# Patient Record
Sex: FEMALE | Race: BLACK OR AFRICAN AMERICAN | NOT HISPANIC OR LATINO | Employment: UNEMPLOYED | URBAN - METROPOLITAN AREA
[De-identification: names, ages, dates, MRNs, and addresses within clinical notes are randomized per-mention and may not be internally consistent; named-entity substitution may affect disease eponyms.]

---

## 2017-01-05 ENCOUNTER — HOSPITAL ENCOUNTER (EMERGENCY)
Facility: OTHER | Age: 22
Discharge: HOME | End: 2017-01-05
Attending: EMERGENCY MEDICINE
Payer: OTHER GOVERNMENT

## 2017-01-05 VITALS
DIASTOLIC BLOOD PRESSURE: 76 MMHG | OXYGEN SATURATION: 99 % | HEART RATE: 78 BPM | HEIGHT: 59 IN | TEMPERATURE: 99 F | RESPIRATION RATE: 18 BRPM | WEIGHT: 145 LBS | BODY MASS INDEX: 29.23 KG/M2 | SYSTOLIC BLOOD PRESSURE: 135 MMHG

## 2017-01-05 DIAGNOSIS — R51.9 ACUTE NONINTRACTABLE HEADACHE, UNSPECIFIED HEADACHE TYPE: Primary | ICD-10-CM

## 2017-01-05 LAB
B-HCG UR QL: NEGATIVE
CTP QC/QA: YES

## 2017-01-05 PROCEDURE — 96372 THER/PROPH/DIAG INJ SC/IM: CPT

## 2017-01-05 PROCEDURE — 25000003 PHARM REV CODE 250: Performed by: PHYSICIAN ASSISTANT

## 2017-01-05 PROCEDURE — 81025 URINE PREGNANCY TEST: CPT | Performed by: PHYSICIAN ASSISTANT

## 2017-01-05 PROCEDURE — 63600175 PHARM REV CODE 636 W HCPCS: Performed by: PHYSICIAN ASSISTANT

## 2017-01-05 PROCEDURE — 99283 EMERGENCY DEPT VISIT LOW MDM: CPT | Mod: 25

## 2017-01-05 RX ORDER — BUTALBITAL, ACETAMINOPHEN AND CAFFEINE 50; 325; 40 MG/1; MG/1; MG/1
1 TABLET ORAL EVERY 6 HOURS PRN
Qty: 8 TABLET | Refills: 0 | Status: SHIPPED | OUTPATIENT
Start: 2017-01-05 | End: 2017-02-04

## 2017-01-05 RX ORDER — KETOROLAC TROMETHAMINE 30 MG/ML
30 INJECTION, SOLUTION INTRAMUSCULAR; INTRAVENOUS
Status: COMPLETED | OUTPATIENT
Start: 2017-01-05 | End: 2017-01-05

## 2017-01-05 RX ORDER — BUTALBITAL, ACETAMINOPHEN AND CAFFEINE 50; 325; 40 MG/1; MG/1; MG/1
1 TABLET ORAL
Status: COMPLETED | OUTPATIENT
Start: 2017-01-05 | End: 2017-01-05

## 2017-01-05 RX ADMIN — BUTALBITAL, ACETAMINOPHEN AND CAFFEINE 1 TABLET: 50; 325; 40 TABLET ORAL at 07:01

## 2017-01-05 RX ADMIN — KETOROLAC TROMETHAMINE 30 MG: 30 INJECTION, SOLUTION INTRAMUSCULAR at 07:01

## 2017-01-05 NOTE — ED AVS SNAPSHOT
[facility]  [address]  [facility] [address]               Suzanna Cai   2017  7:23 PM   ED    Description:  Female : 1995   Department:  [facility]           Your Care was Coordinated By:     Provider Role From To    Porsha Castle MD Attending Provider 17 --    Kianna Pratt PA-C Physician Assistant 17 --      Reason for Visit     Headache           Diagnoses this Visit        Comments    Acute nonintractable headache, unspecified headache type    -  Primary       ED Disposition     None           To Do List           Follow-up Information     Follow up with PCP.    Why:  Keep appointment for tomorrow         Follow up with [facility].    Specialty:  Emergency Medicine    Why:  If symptoms worsen    Contact information:    [address]  [facility] [id number] [phone]       These Medications        Disp Refills Start End    butalbital-acetaminophen-caffeine -40 mg (FIORICET, ESGIC) -40 mg per tablet 8 tablet 0 2017    Take 1 tablet by mouth every 6 (six) hours as needed for Headaches. - Oral    Pharmacy: [facility] [address] [facility] [address]  #: [phone]         [facility]     [facility] Nurse Care Line -  Assistance  Registered nurses in the [facility] provide clinical advisement, health education, appointment booking, and other advisory services.  Call for this free service at [phone].             Medications           Message regarding Medications     Verify the changes and/or additions to your medication regime listed below are the same as discussed with your clinician today.  If any of these changes or additions are incorrect, please notify your healthcare provider.        START taking these NEW medications        Refills    butalbital-acetaminophen-caffeine  "-40 mg (FIORICET, ESGIC) -40 mg per tablet 0    Sig: Take 1 tablet by mouth every 6 (six) hours as needed for Headaches.    Class: Print    Route: Oral      These medications were administered today        Dose Freq    ketorolac injection 30 mg 30 mg ED 1 Time    Sig: Inject 30 mg into the muscle ED 1 Time.    Class: Normal    Route: Intramuscular    butalbital-acetaminophen-caffeine -40 mg per tablet 1 tablet 1 tablet ED 1 Time    Sig: Take 1 tablet by mouth ED 1 Time.    Class: Normal    Route: Oral           Verify that the below list of medications is an accurate representation of the medications you are currently taking.  If none reported, the list may be blank. If incorrect, please contact your healthcare provider. Carry this list with you in case of emergency.           Current Medications     butalbital-acetaminophen-caffeine -40 mg (FIORICET, ESGIC) -40 mg per tablet Take 1 tablet by mouth every 6 (six) hours as needed for Headaches.    butalbital-acetaminophen-caffeine -40 mg per tablet 1 tablet Take 1 tablet by mouth ED 1 Time.    ketorolac injection 30 mg Inject 30 mg into the muscle ED 1 Time.    medroxyPROGESTERone (DEPO-PROVERA) 150 mg/mL injection Inject 1 mL (150 mg total) into the muscle every 3 (three) months.           Clinical Reference Information           Your Vitals Were     BP Pulse Temp Resp Height Weight    138/78 (BP Location: Left arm, Patient Position: Sitting) 75 98.8 °F (37.1 °C) (Oral) 18 4' 11" (1.499 m) 65.8 kg (145 lb)    SpO2 BMI             100% 29.29 kg/m2         Allergies as of 1/5/2017     No Known Allergies      Immunizations Administered on Date of Encounter - 1/5/2017     None      ED Micro, Lab, POCT     Start Ordered       Status Ordering Provider    01/05/17 1943 01/05/17 1943  POCT urine pregnancy  Once      Final result       ED Imaging Orders     None      Discharge References/Attachments     HEADACHE, UNSPECIFIED (ENGLISH)    " HEADACHES, SELF-CARE FOR (ENGLISH)      Your Scheduled Appointments     Jan 06, 2017 11:20 AM CST   [facility] Patient with Liz[facility]o MD   [facility]    [address]  [place] [address]   [phone]               [facility] complies with applicable Federal civil rights laws and does not discriminate on the basis of race, color, national origin, age, disability, or sex.        Language Assistance Services     ATTENTION: Language assistance services are available, free of charge. Please call [phone].      ATENCIÓN: Si habla español, tiene a weiner disposición servicios gratuitos de asistencia lingüística. Llame al [phone].     CHÚ Ý: N?u b?n nói Ti?ng Vi?t, có các d?ch v? h? tr? ngôn ng? mi?n phí dành cho b?n. G?i s? [phone].

## 2017-01-06 ENCOUNTER — OFFICE VISIT (OUTPATIENT)
Dept: INTERNAL MEDICINE | Facility: CLINIC | Age: 22
End: 2017-01-06
Attending: INTERNAL MEDICINE
Payer: OTHER GOVERNMENT

## 2017-01-06 VITALS
DIASTOLIC BLOOD PRESSURE: 68 MMHG | OXYGEN SATURATION: 93 % | BODY MASS INDEX: 30.63 KG/M2 | WEIGHT: 151.69 LBS | SYSTOLIC BLOOD PRESSURE: 114 MMHG | HEART RATE: 86 BPM

## 2017-01-06 DIAGNOSIS — L50.9 HIVES: ICD-10-CM

## 2017-01-06 DIAGNOSIS — Z87.820 HISTORY OF CONCUSSION: ICD-10-CM

## 2017-01-06 DIAGNOSIS — Z00.00 ANNUAL PHYSICAL EXAM: Primary | ICD-10-CM

## 2017-01-06 DIAGNOSIS — F07.81 POST CONCUSSIVE SYNDROME: ICD-10-CM

## 2017-01-06 DIAGNOSIS — R51.9 NONINTRACTABLE EPISODIC HEADACHE, UNSPECIFIED HEADACHE TYPE: ICD-10-CM

## 2017-01-06 PROCEDURE — 99385 PREV VISIT NEW AGE 18-39: CPT | Mod: S$PBB,,, | Performed by: INTERNAL MEDICINE

## 2017-01-06 PROCEDURE — 99214 OFFICE O/P EST MOD 30 MIN: CPT | Mod: PBBFAC | Performed by: INTERNAL MEDICINE

## 2017-01-06 PROCEDURE — 99999 PR PBB SHADOW E&M-EST. PATIENT-LVL IV: CPT | Mod: PBBFAC,,, | Performed by: INTERNAL MEDICINE

## 2017-01-06 RX ORDER — EPINEPHRINE 0.3 MG/.3ML
1 INJECTION SUBCUTANEOUS ONCE
Qty: 2 EACH | Refills: 0 | Status: SHIPPED | OUTPATIENT
Start: 2017-01-06 | End: 2019-08-23

## 2017-01-06 RX ORDER — MEDROXYPROGESTERONE ACETATE 150 MG/ML
INJECTION, SUSPENSION INTRAMUSCULAR
COMMUNITY
Start: 2016-12-08 | End: 2017-05-19 | Stop reason: SDUPTHER

## 2017-01-06 RX ORDER — CETIRIZINE HYDROCHLORIDE 10 MG/1
10 TABLET ORAL DAILY
Qty: 90 TABLET | Refills: 1 | Status: SHIPPED | OUTPATIENT
Start: 2017-01-06 | End: 2017-05-19 | Stop reason: SDUPTHER

## 2017-01-06 NOTE — MR AVS SNAPSHOT
[facility]  [address]  [place] [address]  Phone: [phone]  Fax: [phone]                  Suzanna Cai   2017 11:20 AM   Office Visit    Description:  Female : 1995   Provider:  Lizbeth Galo MD   Department:  [facility]           Reason for Visit     Annual Exam           Diagnoses this Visit        Comments    Annual physical exam    -  Primary     Post concussive syndrome         Nonintractable episodic headache, unspecified headache type         History of concussion         Hives                To Do List           Future Appointments        Provider Department Dept Phone    2017 7:00 AM LAB, [facility] [phone]    2017 8:20 AM MD Dima Vaughan III [facility] - Allergy/ Immunology [phone]    2/10/2017 1:15 PM MD Dima Gamboa [facility] - Dermatology [phone]      Goals (5 Years of Data)     None      Follow-Up and Disposition     Return in about 1 year (around 2018), or if symptoms worsen or fail to improve.       These Medications        Disp Refills Start End    epinephrine (EPIPEN 2-DAVID) 0.3 mg/0.3 mL AtIn 2 each 0 2017    Inject 0.3 mLs (0.3 mg total) into the muscle once. Prn throat swelling and call 911! - Intramuscular    Pharmacy: [facility] [place] - [address] [place] Ph #: [phone]       cetirizine (ZYRTEC) 10 MG tablet 90 tablet 1 2017    Take 1 tablet (10 mg total) by mouth once daily. - Oral    Pharmacy: [facility] [place] [place] - [address] Ph #: [phone]         [facility]     [facility] Nurse Care Line -  Assistance  Registered nurses in the [facility] provide clinical advisement, health education, appointment booking, and other advisory services.  Call for this free service at [phone].             Medications            Message regarding Medications     Verify the changes and/or additions to your medication regime listed below are the same as discussed with your clinician today.  If any of these changes or additions are incorrect, please notify your healthcare provider.        START taking these NEW medications        Refills    epinephrine (EPIPEN 2-DAVID) 0.3 mg/0.3 mL AtIn 0    Sig: Inject 0.3 mLs (0.3 mg total) into the muscle once. Prn throat swelling and call 911!    Class: Normal    Route: Intramuscular    cetirizine (ZYRTEC) 10 MG tablet 1    Sig: Take 1 tablet (10 mg total) by mouth once daily.    Class: Normal    Route: Oral           Verify that the below list of medications is an accurate representation of the medications you are currently taking.  If none reported, the list may be blank. If incorrect, please contact your healthcare provider. Carry this list with you in case of emergency.           Current Medications     butalbital-acetaminophen-caffeine -40 mg (FIORICET, ESGIC) -40 mg per tablet Take 1 tablet by mouth every 6 (six) hours as needed for Headaches.    medroxyPROGESTERone (DEPO-PROVERA) 150 mg/mL injection Inject 1 mL (150 mg total) into the muscle every 3 (three) months.    medroxyPROGESTERone (DEPO-PROVERA) 150 mg/mL Syrg     cetirizine (ZYRTEC) 10 MG tablet Take 1 tablet (10 mg total) by mouth once daily.    epinephrine (EPIPEN 2-DAVID) 0.3 mg/0.3 mL AtIn Inject 0.3 mLs (0.3 mg total) into the muscle once. Prn throat swelling and call 911!           Clinical Reference Information           Vital Signs - Last Recorded  Most recent update: 1/6/2017 11:27 AM by Álvaro Flores MA    BP Pulse Wt SpO2 BMI    114/68 (BP Location: Left arm, Patient Position: Sitting, BP Method: Manual) 86 68.8 kg (151 lb 10.8 oz) (!) 93% 30.63 kg/m2      Blood Pressure          Most Recent Value    BP  114/68      Allergies as of 1/6/2017     No Known Allergies      Immunizations Administered on Date of  Encounter - 1/6/2017     None      Orders Placed During Today's Visit      Normal Orders This Visit    Ambulatory consult to Allergy     Ambulatory Referral to Dermatology     Ambulatory Referral to Neurology     Future Labs/Procedures Expected by Expires    CBC W/ AUTO DIFFERENTIAL  1/6/2017 3/7/2018    COMPREHENSIVE METABOLIC PANEL  1/6/2017 3/7/2018    Lipid panel  1/6/2017 3/6/2018    Lipid panel  1/6/2017 1/6/2018    TSH  1/6/2017 3/7/2018
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
## 2017-01-06 NOTE — ED NOTES
"Pt denies current headache at this time, last pain was earlier today which pt took x2 tylenol which resolved pain, pt just wants to be checked out for "brain aneurism". Pts mother told pt that this could be possible with current symptoms   "

## 2017-01-09 ENCOUNTER — TELEPHONE (OUTPATIENT)
Dept: NEUROLOGY | Facility: CLINIC | Age: 22
End: 2017-01-09

## 2017-01-09 NOTE — TELEPHONE ENCOUNTER
----- Message from Meagan Anderson MD sent at 1/9/2017  8:58 AM CST -----  Contact: Shari with Dr. Galo's office  Please schedule the patient for 1 hour new eval on Monday 1/16 at 8:30AM.  Thank you.   ----- Message -----     From: Antwan Farley     Sent: 1/6/2017   1:18 PM       To: Meagan Anderson MD    Patient referred by Dr. Galo for concussion/headache evaluation. Next available 2/2/2017.  Please advise.  ----- Message -----     From: Rafia Crawford     Sent: 1/6/2017  12:26 PM       To: [facility] Staff    x  1st Request  _  2nd Request  _  3rd Request        Who: Shari with Dr. Galo's office    Why: Shari states pt has headaches due to concussion and Dr. Galo has referred the pt to be seen as soon as possible. An attempt was made but first available is 02/02. Please call pt, thanks!    What Number to Call Back: [phone]    When to Expect a call back: (Before the end of the day)   -- if call after 3:00 call back will be tomorrow.

## 2017-01-09 NOTE — TELEPHONE ENCOUNTER
----- Message from Rafia Crawford sent at 1/6/2017 12:26 PM CST -----  Contact: Shari with Dr. Galo's office  x  1st Request  _  2nd Request  _  3rd Request        Who: Shari with Dr. Galo's office    Why: Shari states pt has headaches due to concussion and Dr. Galo has referred the pt to be seen as soon as possible. An attempt was made but first available is 02/02. Please call pt, thanks!    What Number to Call Back: [phone]    When to Expect a call back: (Before the end of the day)   -- if call after 3:00 call back will be tomorrow.

## 2017-01-13 ENCOUNTER — TELEPHONE (OUTPATIENT)
Dept: NEUROLOGY | Facility: CLINIC | Age: 22
End: 2017-01-13

## 2017-01-16 ENCOUNTER — OFFICE VISIT (OUTPATIENT)
Dept: NEUROLOGY | Facility: CLINIC | Age: 22
End: 2017-01-16
Attending: INTERNAL MEDICINE
Payer: OTHER GOVERNMENT

## 2017-01-16 VITALS
SYSTOLIC BLOOD PRESSURE: 104 MMHG | BODY MASS INDEX: 30.35 KG/M2 | WEIGHT: 150.56 LBS | DIASTOLIC BLOOD PRESSURE: 72 MMHG | HEIGHT: 59 IN | HEART RATE: 72 BPM

## 2017-01-16 DIAGNOSIS — G43.109 MIGRAINE WITH AURA AND WITHOUT STATUS MIGRAINOSUS, NOT INTRACTABLE: Primary | ICD-10-CM

## 2017-01-16 PROCEDURE — 99213 OFFICE O/P EST LOW 20 MIN: CPT | Mod: PBBFAC | Performed by: PSYCHIATRY & NEUROLOGY

## 2017-01-16 PROCEDURE — 99205 OFFICE O/P NEW HI 60 MIN: CPT | Mod: S$PBB,,, | Performed by: PSYCHIATRY & NEUROLOGY

## 2017-01-16 PROCEDURE — 99999 PR PBB SHADOW E&M-EST. PATIENT-LVL III: CPT | Mod: PBBFAC,,, | Performed by: PSYCHIATRY & NEUROLOGY

## 2017-01-16 RX ORDER — SUMATRIPTAN SUCCINATE 25 MG/1
25 TABLET ORAL
Qty: 12 TABLET | Refills: 5 | Status: SHIPPED | OUTPATIENT
Start: 2017-01-16 | End: 2017-02-14 | Stop reason: SDUPTHER
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
## 2017-01-16 NOTE — LETTER
January 16, 2017      Lizbeth Galo MD  [address]  [facility] [address]           [facility] - Neurology  [address]  [facility] [address]  Phone: [phone]  Fax: [phone]          Patient: Suzanna Cai   MR Number: [id number]   YOB: 1995   Date of Visit: 1/16/2017       Dear Dr. Lizbeth Galo:    Thank you for referring Suzanna Cai to me for evaluation. Attached you will find relevant portions of my assessment and plan of care.    If you have questions, please do not hesitate to call me. I look forward to following Suzanna Cai along with you.    Sincerely,    Meagan Anderson MD    Enclosure  CC:  No Recipients    If you would like to receive this communication electronically, please contact [email or web address] or [phone] to request more information on [employer] Link access.    For providers and/or their staff who would like to refer a patient to [facility], please contact us through our one-stop-shop provider referral line, [facility], at [phone].    If you feel you have received this communication in error or would no longer like to receive these types of communications, please e-mail [email or web address]

## 2017-01-16 NOTE — PATIENT INSTRUCTIONS
Thank you for visiting us at [facility].  You were seen by Dr. Meagan Anderson.  You were seen for migraines.  We will be recommending the following:      -please obtain outside hospital records, specifically MRI and CTH reports AND original imaging  -migraine journal: consider an mobile application like [employer] and migraine buddy  --lifestyle modifications: sleep, diet, exercise, reviewed  -sleep hygiene reviewed  -diagnostics: Vitamin D, riboflavin, Co-enzyme Q10, B12/folate  -PPX: Magnesium Oxide supplementation 400mg daily  -Abortive: Fioricet as needed , Excedrin migraine, and sumatriptan as needed   -No more than 2-3 times a week Total      You may receive a survey about your experience at [facility].  We appreciate you taking the time to complete the survey to help us improve our service and care.    -Brain Health lifestyle modifications:    -sleep hygiene   - diet: Mediterranean Diet    -exercise: 20-30 minutes of  Moderate exercise 3-5 times a week   -stress management reviewed   -smoking cessation, alcohol moderation    Check out my blog post for further information:  [email or web address]     Sleep Hygiene:    1. Avoid watching TV, eating, and discussing emotional issues in bed. The bed should be used for sleep and sex only. If not, we can associate the bed with other activities and it often becomes difficult to fall asleep.  2. Minimize noise, light, and temperature extremes during sleep with ear plugs, window blinds, or an electric blanket or air conditioner. Even the slightest nighttime noises or luminescent lights can disrupt the quality of your sleep. Try to keep your bedroom at a comfortable temperature -- not too hot (above 75 degrees) or too cold (below 54 degrees).  3. Try not to drink fluids after 8 p.m. This may reduce awakenings due to urination.  4. Avoid naps, but if you do nap, make it no more than about 25  minutes about eight hours after you awake. But if you have problems falling asleep, then no naps for you.  5. Do not expose your self to bright light if you need to get up at night. Use a small night-light instead.  6. Nicotine is a stimulant and should be avoided particularly near bedtime and upon night awakenings. Having a smoke before bed, although it may feel relaxing, is actually putting a stimulant into your bloodstream.  7. Caffeine is also a stimulant and is present in coffee (100-200 mg), soda (50-75 mg), tea (50-75 mg), and various over-the-counter medications. Caffeine should be discontinued at least four to six hours before bedtime. If you consume large amounts of caffeine and you cut your self off too quickly, beware; you may get headaches that could keep you awake.  8. Although alcohol is a depressant and may help you fall asleep, the subsequent metabolism that clears it from your body when you are sleeping causes a withdrawal syndrome. This withdrawal causes awakenings and is often associated with nightmares and sweats.  9. A light snack may be sleep-inducing, but a heavy meal too close to bedtime interferes with sleep. Stay away from protein and stick to carbohydrates or dairy products. Milk contains the amino acid L-tryptophan, which has been shown in research to help people go to sleep. So milk and cookies or crackers (without chocolate) may be useful and taste good as well.

## 2017-01-20 ENCOUNTER — LAB VISIT (OUTPATIENT)
Dept: LAB | Facility: OTHER | Age: 22
End: 2017-01-20
Attending: INTERNAL MEDICINE
Payer: OTHER GOVERNMENT

## 2017-01-20 DIAGNOSIS — G43.109 MIGRAINE WITH AURA AND WITHOUT STATUS MIGRAINOSUS, NOT INTRACTABLE: ICD-10-CM

## 2017-01-20 DIAGNOSIS — Z00.00 ANNUAL PHYSICAL EXAM: ICD-10-CM

## 2017-01-20 LAB
25(OH)D3+25(OH)D2 SERPL-MCNC: 16 NG/ML
ALBUMIN SERPL BCP-MCNC: 3.7 G/DL
ALP SERPL-CCNC: 86 U/L
ALT SERPL W/O P-5'-P-CCNC: 15 U/L
ANION GAP SERPL CALC-SCNC: 8 MMOL/L
AST SERPL-CCNC: 20 U/L
BASOPHILS # BLD AUTO: 0 K/UL
BASOPHILS NFR BLD: 0 %
BILIRUB SERPL-MCNC: 0.4 MG/DL
BUN SERPL-MCNC: 6 MG/DL
CALCIUM SERPL-MCNC: 9.2 MG/DL
CHLORIDE SERPL-SCNC: 108 MMOL/L
CHOLEST/HDLC SERPL: 3.2 {RATIO}
CHOLEST/HDLC SERPL: 3.2 {RATIO}
CO2 SERPL-SCNC: 24 MMOL/L
CREAT SERPL-MCNC: 0.8 MG/DL
DIFFERENTIAL METHOD: ABNORMAL
EOSINOPHIL # BLD AUTO: 0.1 K/UL
EOSINOPHIL NFR BLD: 1.2 %
ERYTHROCYTE [DISTWIDTH] IN BLOOD BY AUTOMATED COUNT: 13.1 %
EST. GFR  (AFRICAN AMERICAN): >60 ML/MIN/1.73 M^2
EST. GFR  (NON AFRICAN AMERICAN): >60 ML/MIN/1.73 M^2
FOLATE SERPL-MCNC: 11.3 NG/ML
GLUCOSE SERPL-MCNC: 95 MG/DL
HCT VFR BLD AUTO: 38.3 %
HDL/CHOLESTEROL RATIO: 31 %
HDL/CHOLESTEROL RATIO: 31 %
HDLC SERPL-MCNC: 158 MG/DL
HDLC SERPL-MCNC: 158 MG/DL
HDLC SERPL-MCNC: 49 MG/DL
HDLC SERPL-MCNC: 49 MG/DL
HGB BLD-MCNC: 13 G/DL
LDLC SERPL CALC-MCNC: 99.6 MG/DL
LDLC SERPL CALC-MCNC: 99.6 MG/DL
LYMPHOCYTES # BLD AUTO: 2.6 K/UL
LYMPHOCYTES NFR BLD: 42.7 %
MCH RBC QN AUTO: 30.7 PG
MCHC RBC AUTO-ENTMCNC: 33.9 %
MCV RBC AUTO: 90 FL
MONOCYTES # BLD AUTO: 0.2 K/UL
MONOCYTES NFR BLD: 4 %
NEUTROPHILS # BLD AUTO: 3.1 K/UL
NEUTROPHILS NFR BLD: 52.1 %
NONHDLC SERPL-MCNC: 109 MG/DL
NONHDLC SERPL-MCNC: 109 MG/DL
PLATELET # BLD AUTO: 264 K/UL
PMV BLD AUTO: 10.7 FL
POTASSIUM SERPL-SCNC: 4.1 MMOL/L
PROT SERPL-MCNC: 7.5 G/DL
RBC # BLD AUTO: 4.24 M/UL
SODIUM SERPL-SCNC: 140 MMOL/L
TRIGL SERPL-MCNC: 47 MG/DL
TRIGL SERPL-MCNC: 47 MG/DL
TSH SERPL DL<=0.005 MIU/L-ACNC: 1.46 UIU/ML
WBC # BLD AUTO: 6 K/UL

## 2017-01-20 PROCEDURE — 82306 VITAMIN D 25 HYDROXY: CPT

## 2017-01-20 PROCEDURE — 36415 COLL VENOUS BLD VENIPUNCTURE: CPT

## 2017-01-20 PROCEDURE — 82746 ASSAY OF FOLIC ACID SERUM: CPT

## 2017-01-20 PROCEDURE — 80053 COMPREHEN METABOLIC PANEL: CPT

## 2017-01-20 PROCEDURE — 84443 ASSAY THYROID STIM HORMONE: CPT

## 2017-01-20 PROCEDURE — 80061 LIPID PANEL: CPT

## 2017-01-20 PROCEDURE — 85025 COMPLETE CBC W/AUTO DIFF WBC: CPT

## 2017-01-20 PROCEDURE — 82542 COL CHROMOTOGRAPHY QUAL/QUAN: CPT

## 2017-01-20 PROCEDURE — 84252 ASSAY OF VITAMIN B-2: CPT

## 2017-01-20 PROCEDURE — 84207 ASSAY OF VITAMIN B-6: CPT

## 2017-01-23 LAB — PYRIDOXAL SERPL-MCNC: 9 UG/L (ref 5–50)

## 2017-01-25 LAB — UBIQUINONE10 SERPL-MCNC: 0.54 MG/L (ref 0.44–1.64)

## 2017-01-26 LAB — VIT B2 SERPL-MCNC: 6 MCG/L (ref 1–19)

## 2017-02-01 ENCOUNTER — OFFICE VISIT (OUTPATIENT)
Dept: ALLERGY | Facility: CLINIC | Age: 22
End: 2017-02-01
Payer: OTHER GOVERNMENT

## 2017-02-01 VITALS
HEIGHT: 59 IN | OXYGEN SATURATION: 92 % | DIASTOLIC BLOOD PRESSURE: 74 MMHG | BODY MASS INDEX: 31.11 KG/M2 | TEMPERATURE: 99 F | SYSTOLIC BLOOD PRESSURE: 120 MMHG | WEIGHT: 154.31 LBS

## 2017-02-01 DIAGNOSIS — L50.1 IDIOPATHIC URTICARIA: Primary | ICD-10-CM

## 2017-02-01 PROCEDURE — 99999 PR PBB SHADOW E&M-EST. PATIENT-LVL III: CPT | Mod: PBBFAC,,, | Performed by: ALLERGY & IMMUNOLOGY

## 2017-02-01 PROCEDURE — 99213 OFFICE O/P EST LOW 20 MIN: CPT | Mod: PBBFAC | Performed by: ALLERGY & IMMUNOLOGY

## 2017-02-01 PROCEDURE — 99204 OFFICE O/P NEW MOD 45 MIN: CPT | Mod: S$PBB,,, | Performed by: ALLERGY & IMMUNOLOGY

## 2017-02-01 RX ORDER — ERGOCALCIFEROL 1.25 MG/1
50000 CAPSULE ORAL
Qty: 12 CAPSULE | Refills: 1 | Status: SHIPPED | OUTPATIENT
Start: 2017-02-01 | End: 2017-04-24 | Stop reason: SDUPTHER
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
## 2017-02-01 NOTE — LETTER
February 1, 2017      Lizbeth Galo MD  [address]  [facility] [address]           [facility]  [address]  [facility] [address]  Phone: [phone]  Fax: [phone]          Patient: Suzanna Cai   MR Number: [id number]   YOB: 1995   Date of Visit: 2/1/2017       Dear Dr. Lizbeth Galo:    Thank you for referring Suzanna Cai to me for evaluation. Attached you will find relevant portions of my assessment and plan of care.    If you have questions, please do not hesitate to call me. I look forward to following Suzanna Cai along with you.    Sincerely,    HAN Eaton III, MD    Enclosure  CC:  No Recipients    If you would like to receive this communication electronically, please contact [email or web address] or [phone] to request more information on [employer] Link access.    For providers and/or their staff who would like to refer a patient to [facility], please contact us through our one-stop-shop provider referral line, [facility], at [phone].    If you feel you have received this communication in error or would no longer like to receive these types of communications, please e-mail [email or web address]

## 2017-02-02 ENCOUNTER — PATIENT MESSAGE (OUTPATIENT)
Dept: ALLERGY | Facility: CLINIC | Age: 22
End: 2017-02-02

## 2017-02-06 ENCOUNTER — OFFICE VISIT (OUTPATIENT)
Dept: ALLERGY | Facility: CLINIC | Age: 22
End: 2017-02-06
Payer: OTHER GOVERNMENT

## 2017-02-06 VITALS
HEIGHT: 59 IN | WEIGHT: 154.56 LBS | SYSTOLIC BLOOD PRESSURE: 112 MMHG | OXYGEN SATURATION: 95 % | DIASTOLIC BLOOD PRESSURE: 60 MMHG | BODY MASS INDEX: 31.16 KG/M2 | HEART RATE: 71 BPM

## 2017-02-06 DIAGNOSIS — J31.0 CHRONIC RHINITIS: ICD-10-CM

## 2017-02-06 DIAGNOSIS — L50.1 IDIOPATHIC URTICARIA: Primary | ICD-10-CM

## 2017-02-06 DIAGNOSIS — T78.3XXD ANGIOEDEMA, SUBSEQUENT ENCOUNTER: ICD-10-CM

## 2017-02-06 DIAGNOSIS — H10.403 CHRONIC CONJUNCTIVITIS OF BOTH EYES, UNSPECIFIED CHRONIC CONJUNCTIVITIS TYPE: ICD-10-CM

## 2017-02-06 DIAGNOSIS — E55.9 VITAMIN D INSUFFICIENCY: ICD-10-CM

## 2017-02-06 PROCEDURE — 99999 PR PBB SHADOW E&M-EST. PATIENT-LVL III: CPT | Mod: PBBFAC,,, | Performed by: ALLERGY & IMMUNOLOGY

## 2017-02-06 PROCEDURE — 99213 OFFICE O/P EST LOW 20 MIN: CPT | Mod: PBBFAC | Performed by: ALLERGY & IMMUNOLOGY

## 2017-02-06 PROCEDURE — 99214 OFFICE O/P EST MOD 30 MIN: CPT | Mod: S$PBB,,, | Performed by: ALLERGY & IMMUNOLOGY

## 2017-02-06 NOTE — PROGRESS NOTES
Suzanna Cai returns to clinic today for continued evaluation of urticaria and angioedema. She is here alone. She was last seen February 1, 2017.    She did start taking her vitamin D. She was taking her Zyrtec daily. She did not take any on Friday.    That night she had another episode of urticaria. He was red, raised, and pruritic. She take 2 Benadryl and went to bed.    The next day it had resolved.    There is no association with any food, contact, or ingestion.    She denies any rhinitis or conjunctivitis. She denies any cough, wheezing, or shortness of breath.    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    [facility] PEQ ALLERGY QUESTIONNAIRE SHORT 2/6/2017   Are you taking any new medications since your last visit? Yes   Constitution: No symptoms   Head or facial pain: Headaches   Eyes: No symptoms   Ears: No symptoms   Nose: No symptoms   Throat: No symptoms   Sinuses: No symptoms   Lungs: No symptoms   Skin: Itching, Hives, Redness, Swelling   Cardiovascular: No symptoms   Gastrointestinal: No symptoms   Genital/ urinary Urination at night more than twice   Musculoskeletal: No symptoms   Neurologic: Headaches   Endocrine: Headaches   Hematologic: No symptoms     Physical Examination:  General: Well-developed, well-nourished, no acute distress.  Head: No sinus tenderness.  Eyes: Conjunctivae:  No bulbar or palpebral conjunctival injection.  Ears: EAC's clear.  TM's clear.  No pre-auricular nodes.  Nose: Nasal Mucosa:  Pink.  Septum: No apparent deviation.  Turbinates:  No significant edema.  Polyps/Mass:  None visible.  Teeth/Gums:  No bleeding noted.  Oropharynx: No exudates.  Neck: Supple without thyromegaly. No cervical lymphadenopathy.    Respiratory/Chest: Effort: Good.  Auscultation:  Clear bilaterally.  Cardiovascular:  No murmur, rubs, or gallop heard.   GI:  Non-tender.  No masses.  No organomegaly.  Extremities:  No swelling.  No cyanosis, clubbing, or edema.  Skin: Good turgor.  No urticaria or  angioedema.  Neuro/Psych: Oriented x 3.    Pictures are reviewed on her cell phone and are consistent with urticaria and angioedema.    Laboratory 1/20/2017:  CBC: Normal.  CMP: Normal.  TSH: 1.456.  Vitamin D level: 16.  Lipid panel: Cholesterol 158.    Laboratory 2/1/2017:  IgE level: 72.  ImmunoCAP: Negative.  TSH: 1.3-4.  Thyroid peroxidase antibody level: Less than 6.0.  Thyroglobulin antibody level: Less than 4.0.  Serum tryptase: 2.5.  SPEP:  Normal.    Assessment:  1. Chronic urticaria and angioedema, probably idiopathic.  2. Chronic rhinitis, doubt allergic.  3. Chronic conjunctivitis, doubt allergic.  4. Migraine headaches.  5. Vitamin D deficiency.    Recommendations:  1. Take Zyrtec daily.  2. May take 2 Zyrtec if needed.  3. Consider Atarax.  4. Benadryl as needed.  5. Continue vitamin D level.  6. Return to clinic in 2 weeks or sooner if needed.    Allergic mechanisms and treatment options were reviewed in detail. Urticaria and angioedema were reviewed.

## 2017-02-10 ENCOUNTER — INITIAL CONSULT (OUTPATIENT)
Dept: DERMATOLOGY | Facility: CLINIC | Age: 22
End: 2017-02-10
Payer: OTHER GOVERNMENT

## 2017-02-10 DIAGNOSIS — L21.9 ACUTE SEBORRHEIC DERMATITIS: ICD-10-CM

## 2017-02-10 DIAGNOSIS — T78.3XXD ANGIOEDEMA, SUBSEQUENT ENCOUNTER: Primary | ICD-10-CM

## 2017-02-10 DIAGNOSIS — L50.1 IDIOPATHIC URTICARIA: ICD-10-CM

## 2017-02-10 PROCEDURE — 99202 OFFICE O/P NEW SF 15 MIN: CPT | Mod: S$PBB,,, | Performed by: DERMATOLOGY

## 2017-02-10 PROCEDURE — 99999 PR PBB SHADOW E&M-EST. PATIENT-LVL II: CPT | Mod: PBBFAC,,, | Performed by: DERMATOLOGY

## 2017-02-10 PROCEDURE — 99212 OFFICE O/P EST SF 10 MIN: CPT | Mod: PBBFAC | Performed by: DERMATOLOGY

## 2017-02-10 RX ORDER — FLUOCINOLONE ACETONIDE 0.11 MG/ML
OIL TOPICAL
Qty: 1 BOTTLE | Refills: 3 | Status: SHIPPED | OUTPATIENT
Start: 2017-02-10

## 2017-02-10 RX ORDER — KETOCONAZOLE 20 MG/ML
SHAMPOO, SUSPENSION TOPICAL
Qty: 120 ML | Refills: 5 | Status: SHIPPED | OUTPATIENT
Start: 2017-02-10 | End: 2018-07-30

## 2017-02-10 NOTE — MR AVS SNAPSHOT
[facility] - Dermatology  [address]  [facility] [address]  Phone: [phone]  Fax: [phone]                  Suzanna Cai   2/10/2017 1:15 PM   Initial consult    Description:  Female : 1995   Provider:  Jaquelin Delaney MD   Department:  [facility] Dermat[facility]           Reason for Visit     Hair/Scalp Problem     Rash           Diagnoses this Visit        Comments    Angioedema, subsequent encounter    -  Primary     Acute seborrheic dermatitis                To Do List           Future Appointments        Provider Department Dept Phone    2/10/2017 2:25 PM LAB, APPOINTMENT [place] [facility] [phone]    2017 10:00 AM Meagan Anderson MD [facility] Neurology [phone]    2017 1:00 PM HAN Eaton III, MD [facility] - Allergy/ Immunology [phone]      Goals (5 Years of Data)     None       These Medications        Disp Refills Start End    ketoconazole (NIZORAL) 2 % shampoo 120 mL 5 2/10/2017     Wash hair with medicated shampoo at least 2x/week - let sit on scalp at least 5 minutes prior to rinsing    Pharmacy: [facility] [place], [place] - [address] Ph #: [phone]       fluocinolone and shower cap (DERMA-SMOOTHE/FS SCALP OIL) 0.01 % Oil 1 Bottle 3 2/10/2017     Apply oil to damp scalp nightly and cover with shower cap.    Pharmacy: [facility] [place], [place] - [address] Ph #: [phone]         [facility]     [facility] Nurse Care [facility] -  Assistance  Registered nurses in the [facility] provide clinical advisement, health education, appointment booking, and other advisory services.  Call for this free service at [phone].             Medications           Message regarding Medications     Verify the changes and/or additions to your medication regime listed below are the same as discussed with your clinician today.  If any  of these changes or additions are incorrect, please notify your healthcare provider.        START taking these NEW medications        Refills    ketoconazole (NIZORAL) 2 % shampoo 5    Sig: Wash hair with medicated shampoo at least 2x/week - let sit on scalp at least 5 minutes prior to rinsing    Class: Print    fluocinolone and shower cap (DERMA-SMOOTHE/FS SCALP OIL) 0.01 % Oil 3    Sig: Apply oil to damp scalp nightly and cover with shower cap.    Class: Print           Verify that the below list of medications is an accurate representation of the medications you are currently taking.  If none reported, the list may be blank. If incorrect, please contact your healthcare provider. Carry this list with you in case of emergency.           Current Medications     ergocalciferol (ERGOCALCIFEROL) 50,000 unit Cap Take 1 capsule (50,000 Units total) by mouth every 7 days.    medroxyPROGESTERone (DEPO-PROVERA) 150 mg/mL injection Inject 1 mL (150 mg total) into the muscle every 3 (three) months.    medroxyPROGESTERone (DEPO-PROVERA) 150 mg/mL Syrg     sumatriptan (IMITREX) 25 MG Tab Take 1 tablet (25 mg total) by mouth every 2 (two) hours as needed (can repeat after 2 hours once johnson  24 hour period).    cetirizine (ZYRTEC) 10 MG tablet Take 1 tablet (10 mg total) by mouth once daily.    epinephrine (EPIPEN 2-DAVID) 0.3 mg/0.3 mL AtIn Inject 0.3 mLs (0.3 mg total) into the muscle once. Prn throat swelling and call 911!    fluocinolone and shower cap (DERMA-SMOOTHE/FS SCALP OIL) 0.01 % Oil Apply oil to damp scalp nightly and cover with shower cap.    ketoconazole (NIZORAL) 2 % shampoo Wash hair with medicated shampoo at least 2x/week - let sit on scalp at least 5 minutes prior to rinsing           Clinical Reference Information           Allergies as of 2/10/2017     No Known Allergies      Immunizations Administered on Date of Encounter - 2/10/2017     None      Orders Placed During Today's Visit     Future Labs/Procedures  Expected by Expires    C4 COMPLEMENT  2/10/2017 4/11/2018      Language Assistance Services     ATTENTION: Language assistance services are available, free of charge. Please call [phone].      ATENCIÓN: Si habla shayla, tiene a weiner disposición servicios gratuitos de asistencia lingüística. Llame al [phone].     CHÚ Ý: N?u b?n nói Ti?ng Vi?t, có các d?ch v? h? tr? ngôn ng? mi?n phí dành cho b?n. G?i s? [phone].         [facility] complies with applicable Federal civil rights laws and does not discriminate on the basis of race, color, national origin, age, disability, or sex.

## 2017-02-10 NOTE — LETTER
February 11, 2017      Lizbeth Galo MD  [address]  [facility] [address]           [facility] - Dermatology  [address]  [place] [address]  Phone: [phone]  Fax: [phone]          Patient: Suzanna Cai   MR Number: [id number]   YOB: 1995   Date of Visit: 2/10/2017       Dear Dr. Lizbeth Galo:    Thank you for referring Suzanna Cai to me for evaluation. Attached you will find relevant portions of my assessment and plan of care.    If you have questions, please do not hesitate to call me. I look forward to following Suzanna Cai along with you.    Sincerely,    Jaquelin Delaney MD    Enclosure  CC:  No Recipients    If you would like to receive this communication electronically, please contact [email or web address] or [phone] to request more information on [facility] Link access.    For providers and/or their staff who would like to refer a patient to [facility], please contact us through our one-stop-shop provider referral line, [facility], at [phone].    If you feel you have received this communication in error or would no longer like to receive these types of communications, please e-mail [email or web address]

## 2017-02-10 NOTE — PROGRESS NOTES
Subjective:       Patient ID:  Suzanna Cai is a 21 y.o. female who presents for   Chief Complaint   Patient presents with    Hair/Scalp Problem     Dry and flaky, itching, no treatment     Rash     Face     Hair/Scalp Problem  - Initial  Affected locations: scalp  Duration: several years.  Signs / symptoms: itching and dryness  Aggravated by: nothing  Relieving factors/Treatments tried: nothing    Rash  - Initial  Affected locations: diffuse  Duration: 6 months  Signs / symptoms: itching, dryness and swelling  Aggravated by: nothing  Relieving factors/Treatments tried: antihistamines    Sister was recently diagnosed with psoriasis.  Pt states her scalp is always dry, but worse in the winter. No current treatment.    Pt also states she's seen allergy for a workup for recurrent hives and angioedema of lips and eyelids since July 2016. Workup did not reveal any underlying causes and she hasn't noticed any triggers. She is frustrated because she doesn't know what is causing it. Benadryl helps, but it makes her drowsy.    Review of Systems   Constitutional: Positive for night sweats. Negative for fever, chills, weight loss, weight gain, fatigue and malaise.   Skin: Positive for rash. Negative for daily sunscreen use, activity-related sunscreen use and recent sunburn.   Hematologic/Lymphatic: Does not bruise/bleed easily.        Objective:    Physical Exam   Constitutional: She appears well-developed and well-nourished. No distress.   Neurological: She is alert and oriented to person, place, and time. She is not disoriented.   Psychiatric: She has a normal mood and affect.   Skin:   Areas Examined (abnormalities noted in diagram):   Scalp / Hair Palpated and Inspected  Head / Face Inspection Performed  Neck Inspection Performed  RUE Inspected  LUE Inspection Performed  Nails and Digits Inspection Performed                   Diagram Legend     Erythematous scaling macule/papule c/w actinic keratosis       Vascular  papule c/w angioma      Pigmented verrucoid papule/plaque c/w seborrheic keratosis      Yellow umbilicated papule c/w sebaceous hyperplasia      Irregularly shaped tan macule c/w lentigo     1-2 mm smooth white papules consistent with Milia      Movable subcutaneous cyst with punctum c/w epidermal inclusion cyst      Subcutaneous movable cyst c/w pilar cyst      Firm pink to brown papule c/w dermatofibroma      Pedunculated fleshy papule(s) c/w skin tag(s)      Evenly pigmented macule c/w junctional nevus     Mildly variegated pigmented, slightly irregular-bordered macule c/w mildly atypical nevus      Flesh colored to evenly pigmented papule c/w intradermal nevus       Pink pearly papule/plaque c/w basal cell carcinoma      Erythematous hyperkeratotic cursted plaque c/w SCC      Surgical scar with no sign of skin cancer recurrence      Open and closed comedones      Inflammatory papules and pustules      Verrucoid papule consistent consistent with wart     Erythematous eczematous patches and plaques     Dystrophic onycholytic nail with subungual debris c/w onychomycosis     Umbilicated papule    Erythematous-base heme-crusted tan verrucoid plaque consistent with inflamed seborrheic keratosis     Erythematous Silvery Scaling Plaque c/w Psoriasis     See annotation      Assessment / Plan:        Angioedema, subsequent encounter  -     C4 COMPLEMENT; Future    Idiopathic urticaria  Counseled pt that many times, we are unable to find a trigger for urticaria. She occasionally takes medications for migraines, and hasn't paid attention to see if urticaria happens after taking these meds. She will make note of this in the future. Informed her that allergy did a very good workup and the only thing I can think of to add is a C4 level to see if there is any hereditary component to her angioedema. Recommended continuing antihistamines as directed.    Acute seborrheic dermatitis  -     ketoconazole (NIZORAL) 2 % shampoo; Wash  hair with medicated shampoo at least 2x/week - let sit on scalp at least 5 minutes prior to rinsing  Dispense: 120 mL; Refill: 5  -     fluocinolone and shower cap (DERMA-SMOOTHE/FS SCALP OIL) 0.01 % Oil; Apply oil to damp scalp nightly and cover with shower cap.  Dispense: 1 Bottle; Refill: 3    rtc prn

## 2017-02-14 ENCOUNTER — OFFICE VISIT (OUTPATIENT)
Dept: NEUROLOGY | Facility: CLINIC | Age: 22
End: 2017-02-14
Attending: PSYCHIATRY & NEUROLOGY
Payer: OTHER GOVERNMENT

## 2017-02-14 ENCOUNTER — HOSPITAL ENCOUNTER (OUTPATIENT)
Dept: CARDIOLOGY | Facility: OTHER | Age: 22
Discharge: HOME | End: 2017-02-14
Attending: PSYCHIATRY & NEUROLOGY
Payer: OTHER GOVERNMENT

## 2017-02-14 VITALS
DIASTOLIC BLOOD PRESSURE: 96 MMHG | HEART RATE: 66 BPM | WEIGHT: 156.31 LBS | SYSTOLIC BLOOD PRESSURE: 138 MMHG | BODY MASS INDEX: 31.51 KG/M2 | HEIGHT: 59 IN

## 2017-02-14 DIAGNOSIS — S06.9X1D TBI (TRAUMATIC BRAIN INJURY), WITH LOSS OF CONSCIOUSNESS OF 30 MINUTES OR LESS, SUBSEQUENT ENCOUNTER: ICD-10-CM

## 2017-02-14 DIAGNOSIS — G43.109 MIGRAINE WITH AURA AND WITHOUT STATUS MIGRAINOSUS, NOT INTRACTABLE: Primary | ICD-10-CM

## 2017-02-14 DIAGNOSIS — G43.109 MIGRAINE WITH AURA AND WITHOUT STATUS MIGRAINOSUS, NOT INTRACTABLE: ICD-10-CM

## 2017-02-14 PROCEDURE — 99999 PR PBB SHADOW E&M-EST. PATIENT-LVL III: CPT | Mod: PBBFAC,,, | Performed by: PSYCHIATRY & NEUROLOGY

## 2017-02-14 PROCEDURE — 99213 OFFICE O/P EST LOW 20 MIN: CPT | Mod: PBBFAC | Performed by: PSYCHIATRY & NEUROLOGY

## 2017-02-14 PROCEDURE — 93010 ELECTROCARDIOGRAM REPORT: CPT | Mod: ,,, | Performed by: INTERNAL MEDICINE

## 2017-02-14 PROCEDURE — 93005 ELECTROCARDIOGRAM TRACING: CPT

## 2017-02-14 PROCEDURE — 99213 OFFICE O/P EST LOW 20 MIN: CPT | Mod: S$PBB,,, | Performed by: PSYCHIATRY & NEUROLOGY

## 2017-02-14 RX ORDER — SUMATRIPTAN SUCCINATE 25 MG/1
100 TABLET ORAL
Qty: 12 TABLET | Refills: 5 | Status: SHIPPED | OUTPATIENT
Start: 2017-02-14 | End: 2017-04-18 | Stop reason: ALTCHOICE

## 2017-02-14 RX ORDER — BUTALBITAL, ACETAMINOPHEN AND CAFFEINE 50; 325; 40 MG/1; MG/1; MG/1
1 TABLET ORAL EVERY 4 HOURS PRN
COMMUNITY
End: 2017-05-19 | Stop reason: ALTCHOICE

## 2017-02-14 NOTE — MR AVS SNAPSHOT
[facility] - Neurology  [address]  [place] [address]  Phone: [phone]  Fax: [phone]                  Suzanna Cai   2017 10:00 AM   Office Visit    Description:  Female : 1995   Provider:  Meagan Anderson MD   Department:  [facility] - Neurology           Diagnoses this Visit        Comments    Migraine with aura and without status migrainosus, not intractable    -  Primary     TBI (traumatic brain injury), with loss of consciousness of 30 minutes or less, subsequent encounter                To Do List           Future Appointments        Provider Department Dept Phone    2017 1:00 PM HAN Eaton III, MD [facility] - Allergy/ Immunology [phone]    2017 2:30 PM Meagan Anderson MD [facility] Neurology [phone]      Goals (5 Years of Data)     None      Follow-Up and Disposition     Return in about 2 months (around 2017).    Follow-up and Disposition History       These Medications        Disp Refills Start End    sumatriptan (IMITREX) 25 MG Tab 12 tablet 5 2017 3/16/2017    Take 4 tablets (100 mg total) by mouth every 2 (two) hours as needed (can repeat after 2 hours once johnson  24 hour period). - Oral      [facility]     [facility] Care [facility] -  Assistance  Registered nurses in the [facility] provide clinical advisement, health education, appointment booking, and other advisory services.  Call for this free service at [phone].             Medications           Message regarding Medications     Verify the changes and/or additions to your medication regime listed below are the same as discussed with your clinician today.  If any of these changes or additions are incorrect, please notify your healthcare provider.        CHANGE how you are taking these medications     Start Taking Instead of    sumatriptan (IMITREX) 25 MG Tab sumatriptan (IMITREX) 25 MG Tab    Dosage:  Take 4 tablets (100 mg total) by mouth every 2  "(two) hours as needed (can repeat after 2 hours once johnson  24 hour period). Dosage:  Take 1 tablet (25 mg total) by mouth every 2 (two) hours as needed (can repeat after 2 hours once johnson  24 hour period).    Reason for Change:  Reorder            Verify that the below list of medications is an accurate representation of the medications you are currently taking.  If none reported, the list may be blank. If incorrect, please contact your healthcare provider. Carry this list with you in case of emergency.           Current Medications     butalbital-acetaminophen-caffeine -40 mg (FIORICET, ESGIC) -40 mg per tablet Take 1 tablet by mouth every 4 (four) hours as needed for Pain.    cetirizine (ZYRTEC) 10 MG tablet Take 1 tablet (10 mg total) by mouth once daily.    ergocalciferol (ERGOCALCIFEROL) 50,000 unit Cap Take 1 capsule (50,000 Units total) by mouth every 7 days.    fluocinolone and shower cap (DERMA-SMOOTHE/FS SCALP OIL) 0.01 % Oil Apply oil to damp scalp nightly and cover with shower cap.    ketoconazole (NIZORAL) 2 % shampoo Wash hair with medicated shampoo at least 2x/week - let sit on scalp at least 5 minutes prior to rinsing    medroxyPROGESTERone (DEPO-PROVERA) 150 mg/mL injection Inject 1 mL (150 mg total) into the muscle every 3 (three) months.    medroxyPROGESTERone (DEPO-PROVERA) 150 mg/mL Syrg     sumatriptan (IMITREX) 25 MG Tab Take 4 tablets (100 mg total) by mouth every 2 (two) hours as needed (can repeat after 2 hours once johnson  24 hour period).    epinephrine (EPIPEN 2-DAVID) 0.3 mg/0.3 mL AtIn Inject 0.3 mLs (0.3 mg total) into the muscle once. Prn throat swelling and call 911!           Clinical Reference Information           Your Vitals Were     BP Pulse Height Weight BMI    138/96 66 4' 11" (1.499 m) 70.9 kg (156 lb 4.9 oz) 31.57 kg/m2      Blood Pressure          Most Recent Value    BP  (!)  138/96      Allergies as of 2/14/2017     No Known Allergies      Immunizations Administered " on Date of Encounter - 2/14/2017     None      Orders Placed During Today's Visit     Future Labs/Procedures Expected by Expires    EKG 12-lead  As directed 2/14/2018      Instructions    -please obtain outside hospital records, specifically MRI and CTH  -migraine journal: consider an mobile application like [employer] and migraine buddy  --lifestyle modifications: sleep, diet, exercise, reviewed  -sleep hygiene reviewed  -Vitamin D3 1000IU  -Preventative: Magnesium supplementation 400mg daily   -Please call/message in 2 weeks to assess how you are doing  -Abortive: Fioricet as needed (2-3 times a week), Excedrin migraine, and sumatriptan as needed    -Brain Health lifestyle modifications:    -sleep hygiene   - diet: Mediterranean Diet    -exercise: 20-30 minutes of  Moderate exercise 3-5 times a week   -stress management reviewed   -smoking cessation, alcohol moderation    Check out my blog post for further information:  [email or web address]    Sleep Hygiene:    1. Avoid watching TV, eating, and discussing emotional issues in bed. The bed should be used for sleep and sex only. If not, we can associate the bed with other activities and it often becomes difficult to fall asleep.  2. Minimize noise, light, and temperature extremes during sleep with ear plugs, window blinds, or an electric blanket or air conditioner. Even the slightest nighttime noises or luminescent lights can disrupt the quality of your sleep. Try to keep your bedroom at a comfortable temperature -- not too hot (above 75 degrees) or too cold (below 54 degrees).  3. Try not to drink fluids after 8 p.m. This may reduce awakenings due to urination.  4. Avoid naps, but if you do nap, make it no more than about 25 minutes about eight hours after you awake. But if you have problems falling asleep, then no naps for you.  5. Do not expose your self to bright light if you need to get up at night. Use a  small night-light instead.  6. Nicotine is a stimulant and should be avoided particularly near bedtime and upon night awakenings. Having a smoke before bed, although it may feel relaxing, is actually putting a stimulant into your bloodstream.  7. Caffeine is also a stimulant and is present in coffee (100-200 mg), soda (50-75 mg), tea (50-75 mg), and various over-the-counter medications. Caffeine should be discontinued at least four to six hours before bedtime. If you consume large amounts of caffeine and you cut your self off too quickly, beware; you may get headaches that could keep you awake.  8. Although alcohol is a depressant and may help you fall asleep, the subsequent metabolism that clears it from your body when you are sleeping causes a withdrawal syndrome. This withdrawal causes awakenings and is often associated with nightmares and sweats.  9. A light snack may be sleep-inducing, but a heavy meal too close to bedtime interferes with sleep. Stay away from protein and stick to carbohydrates or dairy products. Milk contains the amino acid L-tryptophan, which has been shown in research to help people go to sleep. So milk and cookies or crackers (without chocolate) may be useful and taste good as well.           Language Assistance Services     ATTENTION: Language assistance services are available, free of charge. Please call [phone].      ATENCIÓN: Si arvind shayla, tiene a weiner disposición servicios gratuitos de asistencia lingüística. Llame al [phone].     ANNY Ý: N?u b?n nói Ti?ng Vi?t, có các d?ch v? h? tr? ngôn ng? mi?n phí dành cho b?n. G?i s? [phone].         [facility] complies with applicable Federal civil rights laws and does not discriminate on the basis of race, color, national origin, age, disability, or sex.

## 2017-02-14 NOTE — PATIENT INSTRUCTIONS
-please obtain outside hospital records, specifically MRI and CTH  -migraine journal: consider an mobile application like [employer] and migraine buddy  --lifestyle modifications: sleep, diet, exercise, reviewed  -sleep hygiene reviewed  -Vitamin D3 1000IU  -Preventative: Magnesium supplementation 400mg daily   -Please call/message in 2 weeks to assess how you are doing  -Abortive: Fioricet as needed (2-3 times a week), Excedrin migraine, and sumatriptan as needed    -Brain Health lifestyle modifications:    -sleep hygiene   - diet: Mediterranean Diet    -exercise: 20-30 minutes of  Moderate exercise 3-5 times a week   -stress management reviewed   -smoking cessation, alcohol moderation    Check out my blog post for further information:  [email or web address]    Sleep Hygiene:    1. Avoid watching TV, eating, and discussing emotional issues in bed. The bed should be used for sleep and sex only. If not, we can associate the bed with other activities and it often becomes difficult to fall asleep.  2. Minimize noise, light, and temperature extremes during sleep with ear plugs, window blinds, or an electric blanket or air conditioner. Even the slightest nighttime noises or luminescent lights can disrupt the quality of your sleep. Try to keep your bedroom at a comfortable temperature -- not too hot (above 75 degrees) or too cold (below 54 degrees).  3. Try not to drink fluids after 8 p.m. This may reduce awakenings due to urination.  4. Avoid naps, but if you do nap, make it no more than about 25 minutes about eight hours after you awake. But if you have problems falling asleep, then no naps for you.  5. Do not expose your self to bright light if you need to get up at night. Use a small night-light instead.  6. Nicotine is a stimulant and should be avoided particularly near bedtime and upon night awakenings. Having a smoke before bed, although it may feel relaxing,  is actually putting a stimulant into your bloodstream.  7. Caffeine is also a stimulant and is present in coffee (100-200 mg), soda (50-75 mg), tea (50-75 mg), and various over-the-counter medications. Caffeine should be discontinued at least four to six hours before bedtime. If you consume large amounts of caffeine and you cut your self off too quickly, beware; you may get headaches that could keep you awake.  8. Although alcohol is a depressant and may help you fall asleep, the subsequent metabolism that clears it from your body when you are sleeping causes a withdrawal syndrome. This withdrawal causes awakenings and is often associated with nightmares and sweats.  9. A light snack may be sleep-inducing, but a heavy meal too close to bedtime interferes with sleep. Stay away from protein and stick to carbohydrates or dairy products. Milk contains the amino acid L-tryptophan, which has been shown in research to help people go to sleep. So milk and cookies or crackers (without chocolate) may be useful and taste good as well.
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
## 2017-02-16 ENCOUNTER — TELEPHONE (OUTPATIENT)
Dept: NEUROLOGY | Facility: CLINIC | Age: 22
End: 2017-02-16

## 2017-02-16 ENCOUNTER — PATIENT MESSAGE (OUTPATIENT)
Dept: NEUROLOGY | Facility: CLINIC | Age: 22
End: 2017-02-16

## 2017-02-16 NOTE — TELEPHONE ENCOUNTER
----- Message from Silvia Storey sent at 2/16/2017 12:47 PM CST -----  Contact: pt  _  1st Request  _  2nd Request  _  3rd Request        Who: pt    Why: pt is requesting her test results. Please call the pt     What Number to Call Back:[phone]    When to Expect a call back: (Before the end of the day)   -- if the call is after 12:00, the call back will be tomorrow.

## 2017-02-20 ENCOUNTER — OFFICE VISIT (OUTPATIENT)
Dept: ALLERGY | Facility: CLINIC | Age: 22
End: 2017-02-20
Payer: OTHER GOVERNMENT

## 2017-02-20 VITALS
HEART RATE: 68 BPM | BODY MASS INDEX: 31.11 KG/M2 | SYSTOLIC BLOOD PRESSURE: 132 MMHG | DIASTOLIC BLOOD PRESSURE: 74 MMHG | HEIGHT: 59 IN | WEIGHT: 154.31 LBS

## 2017-02-20 DIAGNOSIS — L50.1 IDIOPATHIC URTICARIA: Primary | ICD-10-CM

## 2017-02-20 DIAGNOSIS — J31.0 CHRONIC RHINITIS: ICD-10-CM

## 2017-02-20 DIAGNOSIS — E55.9 VITAMIN D INSUFFICIENCY: ICD-10-CM

## 2017-02-20 PROCEDURE — 99999 PR PBB SHADOW E&M-EST. PATIENT-LVL III: CPT | Mod: PBBFAC,,, | Performed by: ALLERGY & IMMUNOLOGY

## 2017-02-20 PROCEDURE — 99213 OFFICE O/P EST LOW 20 MIN: CPT | Mod: PBBFAC | Performed by: ALLERGY & IMMUNOLOGY

## 2017-02-20 PROCEDURE — 99214 OFFICE O/P EST MOD 30 MIN: CPT | Mod: S$PBB,,, | Performed by: ALLERGY & IMMUNOLOGY

## 2017-02-20 NOTE — PROGRESS NOTES
Suzanna Cai returns to clinic today for continued evaluation of urticaria and angioedema. She is here with her . She was last seen January 6, 2017.    Since her last visit, she has been taking Zyrtec every day. Since she has been doing that she has not had any further urticaria. She has not had any angioedema.    She has started taking her vitamin D.    There is no association with any food, contact, or ingestion.    She denies any rhinitis or conjunctivitis. She denies any cough, wheezing, or shortness of breath.    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    [facility] PEQ ALLERGY QUESTIONNAIRE SHORT 2/20/2017   Are you taking any new medications since your last visit? Yes   Constitution: No symptoms   Head or facial pain: No symptoms   Eyes: No symptoms   Ears: No symptoms   Nose: No symptoms   Throat: No symptoms   Sinuses: No symptoms   Lungs: No symptoms   Skin: No symptoms   Cardiovascular: Chest pain   Gastrointestinal: No symptoms   Genital/ urinary No symptoms   Musculoskeletal: No symptoms   Neurologic: No symptoms   Endocrine: No symptoms   Hematologic: No symptoms     Physical Examination:  General: Well-developed, well-nourished, no acute distress.  Head: No sinus tenderness.  Eyes: Conjunctivae:  No bulbar or palpebral conjunctival injection.  Ears: EAC's clear.  TM's clear.  No pre-auricular nodes.  Nose: Nasal Mucosa:  Pink.  Septum: No apparent deviation.  Turbinates:  No significant edema.  Polyps/Mass:  None visible.  Teeth/Gums:  No bleeding noted.  Oropharynx: No exudates.  Neck: Supple without thyromegaly. No cervical lymphadenopathy.    Respiratory/Chest: Effort: Good.  Auscultation:  Clear bilaterally.  Skin: Good turgor.  No urticaria or angioedema.  Neuro/Psych: Oriented x 3.    Pictures are reviewed on her cell phone and are consistent with urticaria and angioedema.    Laboratory 1/20/2017:  CBC: Normal.  CMP: Normal.  TSH: 1.456.  Vitamin D level: 16.  Lipid panel: Cholesterol  158.    Laboratory 2/1/2017:  IgE level: 72.  ImmunoCAP: Negative.  TSH: 1.324.  Thyroid peroxidase antibody level: Less than 6.0.  Thyroglobulin antibody level: Less than 4.0.  Serum tryptase: 2.5.  SPEP:  Normal.    Assessment:  1. Chronic urticaria and angioedema, probably idiopathic.  2. Chronic rhinitis, doubt allergic.  3. Chronic conjunctivitis, doubt allergic.  4. Migraine headaches.  5. Vitamin D deficiency.    Recommendations:  1. Take Zyrtec daily.  2. May take 2 Zyrtec if needed.  3. Consider Atarax if needed.  4. Benadryl as needed.  5. Continue vitamin D.  6. Return to clinic in 3 months or sooner if needed for uncontrolled urticaria or angioedema.

## 2017-02-24 ENCOUNTER — TELEPHONE (OUTPATIENT)
Dept: INTERNAL MEDICINE | Facility: CLINIC | Age: 22
End: 2017-02-24

## 2017-02-24 NOTE — TELEPHONE ENCOUNTER
----- Message from Rafia Ty sent at 2/24/2017  2:12 PM CST -----  Contact: Self  X   1st Request  _  2nd Request  _  3rd Request        Who: ELIZ ALBA [[id number]]    Why: Pt states she would like to know if her medical records from her previous Physician has been received. Please call, thanks!    What Number to Call Back: [phone]    When to Expect a call back: (Before the end of the day)   -- if the call is after 12:00, the call back will be tomorrow.

## 2017-03-01 ENCOUNTER — OFFICE VISIT (OUTPATIENT)
Dept: OPTOMETRY | Facility: CLINIC | Age: 22
End: 2017-03-01
Payer: OTHER GOVERNMENT

## 2017-03-01 DIAGNOSIS — Z01.00 EXAMINATION OF EYES AND VISION: ICD-10-CM

## 2017-03-01 DIAGNOSIS — H10.13 ALLERGIC CONJUNCTIVITIS, BILATERAL: Primary | ICD-10-CM

## 2017-03-01 PROCEDURE — 99999 PR PBB SHADOW E&M-EST. PATIENT-LVL II: CPT | Mod: PBBFAC,,, | Performed by: OPTOMETRIST

## 2017-03-01 PROCEDURE — 92004 COMPRE OPH EXAM NEW PT 1/>: CPT | Mod: S$PBB,,, | Performed by: OPTOMETRIST

## 2017-03-01 PROCEDURE — 99212 OFFICE O/P EST SF 10 MIN: CPT | Mod: PBBFAC,PO | Performed by: OPTOMETRIST

## 2017-03-01 RX ORDER — KETOTIFEN FUMARATE 0.35 MG/ML
1 SOLUTION/ DROPS OPHTHALMIC 2 TIMES DAILY
Qty: 10 ML | Refills: 1 | Status: SHIPPED | OUTPATIENT
Start: 2017-03-01 | End: 2017-09-19

## 2017-03-01 NOTE — PROGRESS NOTES
HPI     Dls: ? Yrs     Pt states no changes in vision. Pt does not wear any glasses. Decline   refraction today. Pt c/o itching ou  Tearing ou no pain ha's no floaters.     Eye meds:   None    Family OHx  (--) glaucoma  (--) AMD    Pt needs DMV form filled out today, [place] license.        Last edited by Janeth Crooks, OD on 3/1/2017  4:10 PM.     Assessment /Plan     For exam results, see Encounter Report.    Allergic conjunctivitis, bilateral  -     ketotifen (ZADITOR) 0.025 % (0.035 %) ophthalmic solution; Place 1 drop into both eyes 2 (two) times daily.  Dispense: 10 mL; Refill: 1    Examination of eyes and vision  - Healthy DFE OU  - DMV form filled out for license renewal. [place] license.    rtc 1 yr

## 2017-04-18 ENCOUNTER — OFFICE VISIT (OUTPATIENT)
Dept: NEUROLOGY | Facility: CLINIC | Age: 22
End: 2017-04-18
Attending: PSYCHIATRY & NEUROLOGY
Payer: OTHER GOVERNMENT

## 2017-04-18 VITALS
DIASTOLIC BLOOD PRESSURE: 84 MMHG | SYSTOLIC BLOOD PRESSURE: 122 MMHG | BODY MASS INDEX: 32.18 KG/M2 | HEIGHT: 59 IN | WEIGHT: 159.63 LBS | HEART RATE: 70 BPM

## 2017-04-18 DIAGNOSIS — G43.109 MIGRAINE WITH AURA AND WITHOUT STATUS MIGRAINOSUS, NOT INTRACTABLE: Primary | ICD-10-CM

## 2017-04-18 PROCEDURE — 99214 OFFICE O/P EST MOD 30 MIN: CPT | Mod: S$PBB,,, | Performed by: PSYCHIATRY & NEUROLOGY

## 2017-04-18 PROCEDURE — 99999 PR PBB SHADOW E&M-EST. PATIENT-LVL III: CPT | Mod: PBBFAC,,, | Performed by: PSYCHIATRY & NEUROLOGY

## 2017-04-18 PROCEDURE — 99213 OFFICE O/P EST LOW 20 MIN: CPT | Mod: PBBFAC | Performed by: PSYCHIATRY & NEUROLOGY

## 2017-04-18 RX ORDER — SUMATRIPTAN 50 MG/1
50 TABLET, FILM COATED ORAL
Qty: 8 TABLET | Refills: 5 | Status: SHIPPED | OUTPATIENT
Start: 2017-04-18 | End: 2017-06-20 | Stop reason: SDUPTHER
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
## 2017-04-18 NOTE — PATIENT INSTRUCTIONS
-migraine journal: consider an mobile application like [employer] and migraine buddy  --lifestyle modifications: sleep, diet, exercise, reviewed  -sleep hygiene reviewed  -Vitamin D3 50,000 IU weekly  -Preventative: Magnesium Oxide or Sulfate supplementation 300-500mg daily  -Abortive: Excedrin migraine, and increase sumatriptan(imitrex) 50mg as needed       -Brain Health lifestyle modifications:    -sleep hygiene   - diet: Mediterranean Diet    -exercise: 20-30 minutes of  Moderate exercise 3-5 times a week   -stress management reviewed   -smoking cessation, alcohol moderation    Check out my blog post for further information:  [email or web address]    Sleep Hygiene:    1. Avoid watching TV, eating, and discussing emotional issues in bed. The bed should be used for sleep and sex only. If not, we can associate the bed with other activities and it often becomes difficult to fall asleep.  2. Minimize noise, light, and temperature extremes during sleep with ear plugs, window blinds, or an electric blanket or air conditioner. Even the slightest nighttime noises or luminescent lights can disrupt the quality of your sleep. Try to keep your bedroom at a comfortable temperature -- not too hot (above 75 degrees) or too cold (below 54 degrees).  3. Try not to drink fluids after 8 p.m. This may reduce awakenings due to urination.  4. Avoid naps, but if you do nap, make it no more than about 25 minutes about eight hours after you awake. But if you have problems falling asleep, then no naps for you.  5. Do not expose your self to bright light if you need to get up at night. Use a small night-light instead.  6. Nicotine is a stimulant and should be avoided particularly near bedtime and upon night awakenings. Having a smoke before bed, although it may feel relaxing, is actually putting a stimulant into your bloodstream.  7. Caffeine is also a stimulant and is present in coffee  (100-200 mg), soda (50-75 mg), tea (50-75 mg), and various over-the-counter medications. Caffeine should be discontinued at least four to six hours before bedtime. If you consume large amounts of caffeine and you cut your self off too quickly, beware; you may get headaches that could keep you awake.  8. Although alcohol is a depressant and may help you fall asleep, the subsequent metabolism that clears it from your body when you are sleeping causes a withdrawal syndrome. This withdrawal causes awakenings and is often associated with nightmares and sweats.  9. A light snack may be sleep-inducing, but a heavy meal too close to bedtime interferes with sleep. Stay away from protein and stick to carbohydrates or dairy products. Milk contains the amino acid L-tryptophan, which has been shown in research to help people go to sleep. So milk and cookies or crackers (without chocolate) may be useful and taste good as well.

## 2017-04-21 ENCOUNTER — LAB VISIT (OUTPATIENT)
Dept: LAB | Facility: OTHER | Age: 22
End: 2017-04-21
Attending: INTERNAL MEDICINE
Payer: OTHER GOVERNMENT

## 2017-04-21 ENCOUNTER — OFFICE VISIT (OUTPATIENT)
Dept: INTERNAL MEDICINE | Facility: CLINIC | Age: 22
End: 2017-04-21
Attending: INTERNAL MEDICINE
Payer: OTHER GOVERNMENT

## 2017-04-21 VITALS
DIASTOLIC BLOOD PRESSURE: 60 MMHG | HEIGHT: 59 IN | OXYGEN SATURATION: 98 % | BODY MASS INDEX: 31.64 KG/M2 | HEART RATE: 77 BPM | SYSTOLIC BLOOD PRESSURE: 100 MMHG | WEIGHT: 156.94 LBS

## 2017-04-21 DIAGNOSIS — E55.9 VITAMIN D DEFICIENCY DISEASE: Primary | ICD-10-CM

## 2017-04-21 DIAGNOSIS — L02.214 ABSCESS OF GROIN, LEFT: ICD-10-CM

## 2017-04-21 DIAGNOSIS — E55.9 VITAMIN D DEFICIENCY DISEASE: ICD-10-CM

## 2017-04-21 DIAGNOSIS — R45.4 IRRITABILITY: ICD-10-CM

## 2017-04-21 DIAGNOSIS — F41.1 GAD (GENERALIZED ANXIETY DISORDER): ICD-10-CM

## 2017-04-21 LAB — 25(OH)D3+25(OH)D2 SERPL-MCNC: 20 NG/ML

## 2017-04-21 PROCEDURE — 99214 OFFICE O/P EST MOD 30 MIN: CPT | Mod: S$PBB,,, | Performed by: INTERNAL MEDICINE

## 2017-04-21 PROCEDURE — 99999 PR PBB SHADOW E&M-EST. PATIENT-LVL III: CPT | Mod: PBBFAC,,, | Performed by: INTERNAL MEDICINE

## 2017-04-21 PROCEDURE — 82306 VITAMIN D 25 HYDROXY: CPT

## 2017-04-21 PROCEDURE — 36415 COLL VENOUS BLD VENIPUNCTURE: CPT

## 2017-04-21 PROCEDURE — 99213 OFFICE O/P EST LOW 20 MIN: CPT | Mod: PBBFAC | Performed by: INTERNAL MEDICINE

## 2017-04-21 RX ORDER — SULFAMETHOXAZOLE AND TRIMETHOPRIM 800; 160 MG/1; MG/1
1 TABLET ORAL 2 TIMES DAILY
Qty: 14 TABLET | Refills: 0 | Status: SHIPPED | OUTPATIENT
Start: 2017-04-21 | End: 2017-04-21 | Stop reason: SDUPTHER

## 2017-04-21 RX ORDER — SERTRALINE HYDROCHLORIDE 50 MG/1
50 TABLET, FILM COATED ORAL DAILY
Qty: 30 TABLET | Refills: 1 | Status: SHIPPED | OUTPATIENT
Start: 2017-04-21 | End: 2017-04-21 | Stop reason: SDUPTHER

## 2017-04-21 RX ORDER — SULFAMETHOXAZOLE AND TRIMETHOPRIM 800; 160 MG/1; MG/1
1 TABLET ORAL 2 TIMES DAILY
Qty: 14 TABLET | Refills: 0 | Status: SHIPPED | OUTPATIENT
Start: 2017-04-21 | End: 2017-06-20

## 2017-04-21 RX ORDER — SERTRALINE HYDROCHLORIDE 50 MG/1
50 TABLET, FILM COATED ORAL DAILY
Qty: 30 TABLET | Refills: 1 | Status: SHIPPED | OUTPATIENT
Start: 2017-04-21 | End: 2017-05-19 | Stop reason: SDUPTHER

## 2017-04-21 NOTE — MR AVS SNAPSHOT
[facility]  [address]  [facility] [address]  Phone: [phone]  Fax: [phone]                  Suzanna Cai   2017 10:20 AM   Office Visit    Description:  Female : 1995   Provider:  Lizbeth Galo MD   Department:  [facility] Medicine           Reason for Visit     Anxiety     Rash           Diagnoses this Visit        Comments    Vitamin D deficiency disease    -  Primary     EUGENE (generalized anxiety disorder)         Irritability         Abscess of groin, left                To Do List           Future Appointments        Provider Department Dept Phone    2017 12:30 PM LAB, SAME DAY [facility] [place] [phone]    2017 9:00 AM Lizbeth Galo MD [facility] Internal Medicine [phone]    2017 2:30 PM Meagan Anderson MD [facility] Neurology [phone]      Goals (5 Years of Data)     None      Follow-Up and Disposition     Return in about 4 weeks (around 2017), or if symptoms worsen or fail to improve.       These Medications        Disp Refills Start End    sulfamethoxazole-trimethoprim 800-160mg (BACTRIM DS) 800-160 mg Tab 14 tablet 0 2017     Take 1 tablet by mouth 2 (two) times daily. - Oral    Pharmacy: [facility] [place], [place] - [address] Ph #: [phone]       sertraline (ZOLOFT) 50 MG tablet 30 tablet 1 2017    Take 1 tablet (50 mg total) by mouth once daily. - Oral    Pharmacy: [facility] [place], [place] - [address] Ph #: [phone]         [facility]     [facility] Nurse Care [facility] - 24/ Assistance  Unless otherwise directed by your provider, please contact [facility], our nurse care line that is available for 24/7 assistance.     Registered nurses in the [facility] provide: appointment scheduling, clinical advisement, health education, and other advisory  services.  Call: [phone] (toll free)               Medications           Message regarding Medications     Verify the changes and/or additions to your medication regime listed below are the same as discussed with your clinician today.  If any of these changes or additions are incorrect, please notify your healthcare provider.        START taking these NEW medications        Refills    sulfamethoxazole-trimethoprim 800-160mg (BACTRIM DS) 800-160 mg Tab 0    Sig: Take 1 tablet by mouth 2 (two) times daily.    Class: Print    Route: Oral    sertraline (ZOLOFT) 50 MG tablet 1    Sig: Take 1 tablet (50 mg total) by mouth once daily.    Class: Print    Route: Oral           Verify that the below list of medications is an accurate representation of the medications you are currently taking.  If none reported, the list may be blank. If incorrect, please contact your healthcare provider. Carry this list with you in case of emergency.           Current Medications     butalbital-acetaminophen-caffeine -40 mg (FIORICET, ESGIC) -40 mg per tablet Take 1 tablet by mouth every 4 (four) hours as needed for Pain.    cetirizine (ZYRTEC) 10 MG tablet Take 1 tablet (10 mg total) by mouth once daily.    ergocalciferol (ERGOCALCIFEROL) 50,000 unit Cap Take 1 capsule (50,000 Units total) by mouth every 7 days.    fluocinolone and shower cap (DERMA-SMOOTHE/FS SCALP OIL) 0.01 % Oil Apply oil to damp scalp nightly and cover with shower cap.    ketoconazole (NIZORAL) 2 % shampoo Wash hair with medicated shampoo at least 2x/week - let sit on scalp at least 5 minutes prior to rinsing    ketotifen (ZADITOR) 0.025 % (0.035 %) ophthalmic solution Place 1 drop into both eyes 2 (two) times daily.    medroxyPROGESTERone (DEPO-PROVERA) 150 mg/mL injection Inject 1 mL (150 mg total) into the muscle every 3 (three) months.    medroxyPROGESTERone (DEPO-PROVERA) 150 mg/mL Syrg     sumatriptan (IMITREX) 50 MG tablet Take 1 tablet (50 mg  "total) by mouth every 2 (two) hours as needed for Migraine (can repeat once after 2 hours in a 24 hour period).    epinephrine (EPIPEN 2-DAVID) 0.3 mg/0.3 mL AtIn Inject 0.3 mLs (0.3 mg total) into the muscle once. Prn throat swelling and call 911!    sertraline (ZOLOFT) 50 MG tablet Take 1 tablet (50 mg total) by mouth once daily.    sulfamethoxazole-trimethoprim 800-160mg (BACTRIM DS) 800-160 mg Tab Take 1 tablet by mouth 2 (two) times daily.           Clinical Reference Information           Your Vitals Were     BP Pulse Height Weight SpO2 BMI    100/60 (BP Location: Left arm, Patient Position: Sitting, BP Method: Manual) 77 4' 11" (1.499 m) 71.2 kg (156 lb 15.5 oz) 98% 31.7 kg/m2      Blood Pressure          Most Recent Value    BP  100/60      Allergies as of 4/21/2017     No Known Allergies      Immunizations Administered on Date of Encounter - 4/21/2017     None      Orders Placed During Today's Visit     Future Labs/Procedures Expected by Expires    Vitamin D  7/20/2017 (Approximate) 6/20/2018      Language Assistance Services     ATTENTION: Language assistance services are available, free of charge. Please call [phone].      ATENCIÓN: Si arvind shayla, tiene a weiner disposición servicios gratuitos de asistencia lingüística. Llame al [phone].     ANNY Ý: N?u b?n nói Ti?ng Vi?t, có các d?ch v? h? tr? ngôn ng? mi?n phí dành cho b?n. G?i s? [phone].         [facility] complies with applicable Federal civil rights laws and does not discriminate on the basis of race, color, national origin, age, disability, or sex.        "

## 2017-04-21 NOTE — PROGRESS NOTES
Subjective:       Patient ID: Suzanna Cai is a 22 y.o. female.    Chief Complaint: Anxiety and Rash (groin area)    HPI     Pt here for anxiety. Reports Test anxiety worse over the past few months. Has hx of anxiety - reports racing thoughts at night when trying to fall asleep. Irritability and poor concentration when overwhelmed. + mild social anxiety as well.   In high school made A and Bs. gpa 3.5  In college currently and reports anxiety worsens with test taking.   In school at [employer]  Has not reached out to student services to date     Reports noticed red, raised bump at left inguinal area by panty line about 7 days ago. Reports decreased in size since first noticed but size has been stable over past 2 days - is not resolving. No fevers or chills. No vaginal discharge. No recent shaving but did have brazilian wax a few weeks ago.     Review of Systems   Constitutional: Negative for activity change and unexpected weight change.   HENT: Negative for hearing loss, rhinorrhea and trouble swallowing.    Eyes: Negative for discharge and visual disturbance.   Respiratory: Negative for chest tightness and wheezing.    Cardiovascular: Negative for chest pain and palpitations.   Gastrointestinal: Negative for blood in stool, constipation, diarrhea and vomiting.   Endocrine: Negative for polydipsia and polyuria.   Genitourinary: Negative for difficulty urinating, dysuria, hematuria and menstrual problem.   Musculoskeletal: Negative for arthralgias and joint swelling.   Neurological: Negative for weakness and headaches.   Psychiatric/Behavioral: Positive for decreased concentration. Negative for confusion, dysphoric mood, self-injury and suicidal ideas. The patient is nervous/anxious.        Objective:      Physical Exam   Constitutional: She is oriented to person, place, and time. She appears well-developed and well-nourished.   HENT:   Head: Normocephalic and atraumatic.   Eyes: Conjunctivae and EOM are  normal.   Neck: Neck supple.   Cardiovascular: Normal rate, regular rhythm, normal heart sounds and intact distal pulses.    Pulmonary/Chest: Effort normal and breath sounds normal.   Abdominal: Soft. Normal appearance and bowel sounds are normal.   Genitourinary:   Genitourinary Comments: 4mm round raised tender abscess at left perineal region.    Musculoskeletal: She exhibits no edema or tenderness.   Lymphadenopathy:     She has no cervical adenopathy.   Neurological: She is alert and oriented to person, place, and time. She has normal strength. Gait normal.   Skin: Skin is warm, dry and intact. No cyanosis. Nails show no clubbing.   Psychiatric: She has a normal mood and affect. Her speech is normal and behavior is normal. Judgment and thought content normal. Cognition and memory are normal.       Assessment:       Suzanna was seen today for anxiety and rash.    Diagnoses and all orders for this visit:    Vitamin D deficiency disease: completed vit d replacement, check level today. Start 1000u vit d daily  -     Vitamin D; Future    EUGENE (generalized anxiety disorder): start trial of zoloft. Potential side effects of medication discussed with patient. If the patient has any issues with medication, patient  understands to let me know. Return to clinic and ER prompts given.     Irritability: 2/2 to issue above all    Abscess of groin, left: start bactrim. Er and rtc prompts given    Other orders.  -     sulfamethoxazole-trimethoprim 800-160mg (BACTRIM DS) 800-160 mg Tab; Take 1 tablet by mouth 2 (two) times daily.  -     sertraline (ZOLOFT) 50 MG tablet; Take 1 tablet (50 mg total) by mouth once daily.    rtc in 4-6 weeks for med check

## 2017-04-24 ENCOUNTER — TELEPHONE (OUTPATIENT)
Dept: INTERNAL MEDICINE | Facility: CLINIC | Age: 22
End: 2017-04-24

## 2017-04-24 ENCOUNTER — PATIENT MESSAGE (OUTPATIENT)
Dept: INTERNAL MEDICINE | Facility: CLINIC | Age: 22
End: 2017-04-24

## 2017-04-24 DIAGNOSIS — L50.1 IDIOPATHIC URTICARIA: ICD-10-CM

## 2017-04-24 DIAGNOSIS — E55.9 VITAMIN D DEFICIENCY: Primary | ICD-10-CM

## 2017-04-24 DIAGNOSIS — E55.9 VITAMIN D DEFICIENCY: ICD-10-CM

## 2017-04-24 RX ORDER — ERGOCALCIFEROL 1.25 MG/1
50000 CAPSULE ORAL
Qty: 12 CAPSULE | Refills: 1 | Status: SHIPPED | OUTPATIENT
Start: 2017-04-24 | End: 2017-04-24 | Stop reason: SDUPTHER

## 2017-04-24 RX ORDER — ERGOCALCIFEROL 1.25 MG/1
50000 CAPSULE ORAL
Qty: 12 CAPSULE | Refills: 1 | Status: SHIPPED | OUTPATIENT
Start: 2017-04-24 | End: 2017-04-27 | Stop reason: SDUPTHER

## 2017-04-24 NOTE — TELEPHONE ENCOUNTER
Message in regards to pcp's rec sent to pt via [employer]; Also attempted to contact the pt via phone. Left a message pt's voicemail to return call to office to schedule repeat labs.

## 2017-04-26 ENCOUNTER — OFFICE VISIT (OUTPATIENT)
Dept: INTERNAL MEDICINE | Facility: CLINIC | Age: 22
End: 2017-04-26
Attending: FAMILY MEDICINE
Payer: OTHER GOVERNMENT

## 2017-04-26 VITALS
HEIGHT: 59 IN | BODY MASS INDEX: 31.6 KG/M2 | HEART RATE: 71 BPM | DIASTOLIC BLOOD PRESSURE: 85 MMHG | WEIGHT: 156.75 LBS | SYSTOLIC BLOOD PRESSURE: 122 MMHG

## 2017-04-26 DIAGNOSIS — M54.50 ACUTE MIDLINE LOW BACK PAIN WITHOUT SCIATICA: Primary | ICD-10-CM

## 2017-04-26 PROCEDURE — 99999 PR PBB SHADOW E&M-EST. PATIENT-LVL III: CPT | Mod: PBBFAC,,, | Performed by: FAMILY MEDICINE

## 2017-04-26 PROCEDURE — 99213 OFFICE O/P EST LOW 20 MIN: CPT | Mod: S$PBB,,, | Performed by: FAMILY MEDICINE

## 2017-04-26 PROCEDURE — 96372 THER/PROPH/DIAG INJ SC/IM: CPT | Mod: PBBFAC

## 2017-04-26 PROCEDURE — 99213 OFFICE O/P EST LOW 20 MIN: CPT | Mod: PBBFAC | Performed by: FAMILY MEDICINE

## 2017-04-26 RX ORDER — NABUMETONE 500 MG/1
500 TABLET, FILM COATED ORAL 2 TIMES DAILY
Qty: 60 TABLET | Refills: 3 | Status: SHIPPED | OUTPATIENT
Start: 2017-04-26 | End: 2017-05-26

## 2017-04-26 RX ORDER — KETOROLAC TROMETHAMINE 30 MG/ML
60 INJECTION, SOLUTION INTRAMUSCULAR; INTRAVENOUS
Status: COMPLETED | OUTPATIENT
Start: 2017-04-26 | End: 2017-04-26

## 2017-04-26 RX ORDER — CYCLOBENZAPRINE HCL 10 MG
10 TABLET ORAL 3 TIMES DAILY PRN
Qty: 25 TABLET | Refills: 1 | Status: SHIPPED | OUTPATIENT
Start: 2017-04-26 | End: 2017-09-05

## 2017-04-26 RX ADMIN — KETOROLAC TROMETHAMINE 60 MG: 60 INJECTION, SOLUTION INTRAMUSCULAR at 11:04

## 2017-04-26 NOTE — MR AVS SNAPSHOT
[facility]  [address]  [facility] [address]  Phone: [phone]  Fax: [phone]                  Suzanna Cai   2017 9:40 AM   Office Visit    Description:  Female : 1995   Provider:  Renard Patino MD   Department:  [facility]           Reason for Visit     Back Pain           Diagnoses this Visit        Comments    Acute midline low back pain without sciatica    -  Primary            To Do List           Future Appointments        Provider Department Dept Phone    2017 9:00 AM Lizbeth Galo MD [facility] Medicine [phone]    2017 2:30 PM Meagan Anderson MD [facility] Neurology [phone]      Goals (5 Years of Data)     None       These Medications        Disp Refills Start End    nabumetone (RELAFEN) 500 MG tablet 60 tablet 3 2017    Take 1 tablet (500 mg total) by mouth 2 (two) times daily. - Oral    Pharmacy: [facility] [address] Ph #: [phone]       cyclobenzaprine (FLEXERIL) 10 MG tablet 25 tablet 1 2017     Take 1 tablet (10 mg total) by mouth 3 (three) times daily as needed for Muscle spasms. - Oral    Pharmacy: [facility] [address] Ph #: [phone]         [facility]     [facility] Care [facility] - / Assistance  Unless otherwise directed by your provider, please contact [facility], our nurse care line that is available for / assistance.     Registered nurses in the [facility] provide: appointment scheduling, clinical advisement, health education, and other advisory services.  Call: [phone] (toll free)               Medications           Message regarding Medications     Verify the changes and/or additions to your medication regime listed below are the same as discussed with your clinician today.  If any of these  changes or additions are incorrect, please notify your healthcare provider.        START taking these NEW medications        Refills    nabumetone (RELAFEN) 500 MG tablet 3    Sig: Take 1 tablet (500 mg total) by mouth 2 (two) times daily.    Class: Print    Route: Oral    cyclobenzaprine (FLEXERIL) 10 MG tablet 1    Sig: Take 1 tablet (10 mg total) by mouth 3 (three) times daily as needed for Muscle spasms.    Class: Print    Route: Oral      These medications were administered today        Dose Freq    ketorolac injection 60 mg 60 mg Clinic/HOD 1 time    Sig: Inject 2 mLs (60 mg total) into the muscle one time.    Class: Normal    Route: Intramuscular           Verify that the below list of medications is an accurate representation of the medications you are currently taking.  If none reported, the list may be blank. If incorrect, please contact your healthcare provider. Carry this list with you in case of emergency.           Current Medications     butalbital-acetaminophen-caffeine -40 mg (FIORICET, ESGIC) -40 mg per tablet Take 1 tablet by mouth every 4 (four) hours as needed for Pain.    cetirizine (ZYRTEC) 10 MG tablet Take 1 tablet (10 mg total) by mouth once daily.    ergocalciferol (ERGOCALCIFEROL) 50,000 unit Cap Take 1 capsule (50,000 Units total) by mouth every 7 days.    fluocinolone and shower cap (DERMA-SMOOTHE/FS SCALP OIL) 0.01 % Oil Apply oil to damp scalp nightly and cover with shower cap.    ketoconazole (NIZORAL) 2 % shampoo Wash hair with medicated shampoo at least 2x/week - let sit on scalp at least 5 minutes prior to rinsing    ketotifen (ZADITOR) 0.025 % (0.035 %) ophthalmic solution Place 1 drop into both eyes 2 (two) times daily.    medroxyPROGESTERone (DEPO-PROVERA) 150 mg/mL injection Inject 1 mL (150 mg total) into the muscle every 3 (three) months.    medroxyPROGESTERone (DEPO-PROVERA) 150 mg/mL Syrg     sertraline (ZOLOFT) 50 MG tablet Take 1 tablet (50 mg total) by mouth  "once daily.    sulfamethoxazole-trimethoprim 800-160mg (BACTRIM DS) 800-160 mg Tab Take 1 tablet by mouth 2 (two) times daily.    sumatriptan (IMITREX) 50 MG tablet Take 1 tablet (50 mg total) by mouth every 2 (two) hours as needed for Migraine (can repeat once after 2 hours in a 24 hour period).    cyclobenzaprine (FLEXERIL) 10 MG tablet Take 1 tablet (10 mg total) by mouth 3 (three) times daily as needed for Muscle spasms.    epinephrine (EPIPEN 2-DAVID) 0.3 mg/0.3 mL AtIn Inject 0.3 mLs (0.3 mg total) into the muscle once. Prn throat swelling and call 911!    nabumetone (RELAFEN) 500 MG tablet Take 1 tablet (500 mg total) by mouth 2 (two) times daily.           Clinical Reference Information           Your Vitals Were     BP Pulse Height Weight BMI    122/85 71 4' 11" (1.499 m) 71.1 kg (156 lb 12 oz) 31.66 kg/m2      Blood Pressure          Most Recent Value    BP  122/85      Allergies as of 4/26/2017     No Known Allergies      Immunizations Administered on Date of Encounter - 4/26/2017     None      Administrations This Visit     ketorolac injection 60 mg     Admin Date Action Dose Route Administered By             04/26/2017 Given 60 mg Intramuscular Tita Butler LPN                      Instructions    Your test results will be communicated to you via: [facility], Telephone or Letter.  If you have not received your test results within one week. Please contact the clinic.           Language Assistance Services     ATTENTION: Language assistance services are available, free of charge. Please call [phone].      ATENCIÓN: Si habla manuelitoañol, tiene a weiner disposición servicios gratuitos de asistencia lingüística. Llame al [phone].     CHÚ Ý: N?u b?n nói Ti?ng Vi?t, có các d?ch v? h? tr? ngôn ng? mi?n phí dành cho b?n. G?i s? [phone].         [facility] complies with applicable Federal civil rights laws and does not discriminate on the basis of race, color, national origin, " age, disability, or sex.

## 2017-04-26 NOTE — PROGRESS NOTES
Patient given Ketorolac 60mg IM in the LUOQ. Patient tolerated well and Band-Aid was applied. Lot#[id number] Exp:3/1/2018. Patient advised to wait in the lobby for 15 min to make sure no adverse reactions occur. Patient states verbal understanding and has no further questions.

## 2017-04-27 DIAGNOSIS — E55.9 VITAMIN D DEFICIENCY: ICD-10-CM

## 2017-04-27 RX ORDER — ERGOCALCIFEROL 1.25 MG/1
50000 CAPSULE ORAL
Qty: 12 CAPSULE | Refills: 1 | Status: CANCELLED | OUTPATIENT
Start: 2017-04-27

## 2017-04-27 NOTE — TELEPHONE ENCOUNTER
----- Message from Nubia Yee sent at 4/27/2017  2:46 PM CDT -----  Contact: Patient herself  _  1st Request  X  2nd Request  _  3rd Request    Who: Suzanna Cai (mrn# [id number])    Medication # 1  ERGOCALCIFEROL 50,000 capsules     Preferred Pharm[place]  [place]

## 2017-04-27 NOTE — TELEPHONE ENCOUNTER
----- Message from Nelli Mark sent at 4/26/2017  1:19 PM CDT -----  Contact: ELIZ ALBA [[id number]]  x_  1st Request  _  2nd Request  _  3rd Request    Please refill the medication(s) listed below. Please call the patient when the prescription(s) is ready for  at the phone number ([phone]) . Pt would like a call back     Medication #1 ergocalciferol (ERGOCALCIFEROL) 50,000 unit Cap    Preferred Pharmacy:  [facility] [place], [place] - [address] [address]  Phone: [phone] Fax: [phone]

## 2017-04-27 NOTE — PROGRESS NOTES
Answers for HPI/ROS submitted by the patient on 4/26/2017   Back pain  Chronicity: recurrent  Onset: yesterday  Frequency: constantly  Progression since onset: rapidly worsening  Pain location: lumbar spine, sacro-iliac, thoracic spine, costovertebral angle  Pain quality: aching, stabbing  Radiates to: does not radiate  Pain - numeric: 10/10  Pain is: the same all the time  Aggravated by: bending, position, lying down, sitting, standing, twisting, urinating  Stiffness is present: all day  abdominal pain: No  bladder incontinence: No  bowel incontinence: No  chest pain: No  dysuria: No  fever: No  headaches: No  leg pain: No  numbness: No  paresis: No  paresthesias: No  pelvic pain: No  perianal numbness: No  tingling: No  weakness: No  weight loss: No  genital pain: No  hematuria: No  Risk factors: lack of exercise, poor posture  Pain severity: severe  Treatments tried: NSAIDs, bed rest  Improvement on treatment: no relief  CHIEF COMPLAINT: Atraumatic low back pain for several days    HISTORY OF PRESENT ILLNESS: The patient presents with several days of atraumatic low back pain.  There is no evidence of radiculopathy.  The patient denies any lower extremity complaints.  There is no bowel or bladder complaint.  The patient has no significant spinal history.  Over-the-counter medicines have not helped.  There is no history of trauma.    REVIEW OF SYSTEMS:  GENERAL: No fever, chills, fatigability or weight loss.  SKIN: No rashes, itching or changes in color or texture of skin.  HEAD: No headaches or recent head trauma.  EYES: Visual acuity fine. No photophobia, ocular pain or diplopia.  EARS: Denies ear pain, discharge or vertigo.  NOSE: No loss of smell, no epistaxis or postnasal drip.  MOUTH & THROAT: No hoarseness or change in voice. No excessive gum bleeding.  NODES: Denies swollen glands.  CHEST: Denies ELLIS, cyanosis, wheezing, cough and sputum production.  CARDIOVASCULAR: Denies chest pain, PND, orthopnea or  "reduced exercise tolerance.  ABDOMEN: Appetite fine. No weight loss. Denies diarrhea, abdominal pain, hematemesis or blood in stool.  URINARY: No flank pain, dysuria or hematuria.  PERIPHERAL VASCULAR: No claudication or cyanosis.  MUSCULOSKELETAL: No joint stiffness or swelling.  The patient does have back pain.  NEUROLOGIC: No history of seizures, paralysis, alteration of gait or coordination.    MEDICATIONS: The patient's MedCard has been reviewed and reconciled.     ALLERGIES: The patients' Allergy Card has been reviewed and reconciled.    PAST MEDICAL HISTORY: Unchanged since recent note by PCP.    SOCIAL HISTORY: Unchanged since recent note by PCP.    PHYSICAL EXAMINATION:   Blood pressure 122/85, pulse 71, height 4' 11" (1.499 m), weight 71.1 kg (156 lb 12 oz).      APPEARANCE: Well nourished, well developed, in no acute distress.    HEAD: Normocephalic, atraumatic.  EYES: PERRL. EOMI.  Conjunctivae without injection and  anicteric  EARS: TM's intact. Light reflex normal. No retraction or perforation.    NOSE: Mucosa pink. Airway clear.  MOUTH & THROAT: No tonsillar enlargement. No pharyngeal erythema or exudate. No stridor.  NECK: Supple.   NODES: No cervical, axillary or inguinal lymph node enlargement.  ABDOMEN: Bowel sounds normal. Not distended. Soft. No tenderness or masses.  No ascites is noted.  MUSCULOSKELETAL:  There is no clubbing, cyanosis, or edema of the extremities x4.  There is full range of motion of the lumbar spine.  There is full range of motion of the extremities x4.  There is no deformity noted.    NEUROLOGIC:       Normal speech development.      Hearing normal.      Normal gait.      Motor and sensory exams grossly normal.      DTR's normal.  PSYCHIATRIC: Patient is alert and oriented x3.  Thought processes are all normal.  There is no homicidality.  There is no suicidality.  There is no evidence of psychosis.    LABORATORY/RADIOLOGY: Chart reviewed    ASSESSMENT:   Atraumatic low back " pain    PLAN:  Toradol 60 mg IM  Flexeril and Relafen when necessary  Patient to call with failure to improve

## 2017-04-27 NOTE — TELEPHONE ENCOUNTER
Looks like covering MD printed and signed paper Rx. Please locate this and notify pt it is ready for

## 2017-04-28 RX ORDER — ERGOCALCIFEROL 1.25 MG/1
50000 CAPSULE ORAL
Qty: 12 CAPSULE | Refills: 1 | Status: SHIPPED | OUTPATIENT
Start: 2017-04-28 | End: 2018-08-28

## 2017-05-19 ENCOUNTER — OFFICE VISIT (OUTPATIENT)
Dept: INTERNAL MEDICINE | Facility: CLINIC | Age: 22
End: 2017-05-19
Attending: INTERNAL MEDICINE
Payer: OTHER GOVERNMENT

## 2017-05-19 VITALS
BODY MASS INDEX: 31.74 KG/M2 | WEIGHT: 157.44 LBS | HEIGHT: 59 IN | DIASTOLIC BLOOD PRESSURE: 80 MMHG | SYSTOLIC BLOOD PRESSURE: 118 MMHG | HEART RATE: 91 BPM

## 2017-05-19 DIAGNOSIS — J06.9 UPPER RESPIRATORY TRACT INFECTION, UNSPECIFIED TYPE: Primary | ICD-10-CM

## 2017-05-19 DIAGNOSIS — L50.9 HIVES: ICD-10-CM

## 2017-05-19 DIAGNOSIS — F41.1 GAD (GENERALIZED ANXIETY DISORDER): ICD-10-CM

## 2017-05-19 PROCEDURE — 99213 OFFICE O/P EST LOW 20 MIN: CPT | Mod: S$PBB,,, | Performed by: INTERNAL MEDICINE

## 2017-05-19 PROCEDURE — 99999 PR PBB SHADOW E&M-EST. PATIENT-LVL III: CPT | Mod: PBBFAC,,, | Performed by: INTERNAL MEDICINE

## 2017-05-19 PROCEDURE — 99213 OFFICE O/P EST LOW 20 MIN: CPT | Mod: PBBFAC | Performed by: INTERNAL MEDICINE

## 2017-05-19 RX ORDER — SERTRALINE HYDROCHLORIDE 50 MG/1
50 TABLET, FILM COATED ORAL DAILY
Qty: 90 TABLET | Refills: 3 | Status: SHIPPED | OUTPATIENT
Start: 2017-05-19 | End: 2018-07-28

## 2017-05-19 NOTE — MR AVS SNAPSHOT
[facility]  [address]  [place] [address]  Phone: [phone]  Fax: [phone]                  Suzanna Cai   2017 9:00 AM   Office Visit    Description:  Female : 1995   Provider:  Lizbeth Galo MD   Department:  [facility]           Reason for Visit     Follow-up                To Do List           Future Appointments        Provider Department Dept Phone    2017 2:30 PM Meagan Anderson MD [facility] - Neurology [phone]      Goals (5 Years of Data)     None      Follow-Up and Disposition     Return if symptoms worsen or fail to improve.       These Medications        Disp Refills Start End    sertraline (ZOLOFT) 50 MG tablet 90 tablet 3 2017    Take 1 tablet (50 mg total) by mouth once daily. - Oral    Pharmacy: [facility] [address] [place] [address] Ph #: [phone]         [facility]     [facility] Nurse Care Line -  Assistance  Unless otherwise directed by your provider, please contact [facility], our nurse care line that is available for  assistance.     Registered nurses in the [facility] provide: appointment scheduling, clinical advisement, health education, and other advisory services.  Call: [phone] (toll free)               Medications           Message regarding Medications     Verify the changes and/or additions to your medication regime listed below are the same as discussed with your clinician today.  If any of these changes or additions are incorrect, please notify your healthcare provider.        STOP taking these medications     butalbital-acetaminophen-caffeine -40 mg (FIORICET, ESGIC) -40 mg per tablet Take 1 tablet by mouth every 4 (four) hours as needed for Pain.           Verify that the below list of medications is an accurate representation of the medications you are currently taking.  If none  "reported, the list may be blank. If incorrect, please contact your healthcare provider. Carry this list with you in case of emergency.           Current Medications     cetirizine (ZYRTEC) 10 MG tablet Take 1 tablet (10 mg total) by mouth once daily.    cyclobenzaprine (FLEXERIL) 10 MG tablet Take 1 tablet (10 mg total) by mouth 3 (three) times daily as needed for Muscle spasms.    epinephrine (EPIPEN 2-DAVID) 0.3 mg/0.3 mL AtIn Inject 0.3 mLs (0.3 mg total) into the muscle once. Prn throat swelling and call 911!    ergocalciferol (ERGOCALCIFEROL) 50,000 unit Cap Take 1 capsule (50,000 Units total) by mouth every 7 days.    fluocinolone and shower cap (DERMA-SMOOTHE/FS SCALP OIL) 0.01 % Oil Apply oil to damp scalp nightly and cover with shower cap.    ketoconazole (NIZORAL) 2 % shampoo Wash hair with medicated shampoo at least 2x/week - let sit on scalp at least 5 minutes prior to rinsing    ketotifen (ZADITOR) 0.025 % (0.035 %) ophthalmic solution Place 1 drop into both eyes 2 (two) times daily.    medroxyPROGESTERone (DEPO-PROVERA) 150 mg/mL injection Inject 1 mL (150 mg total) into the muscle every 3 (three) months.    nabumetone (RELAFEN) 500 MG tablet Take 1 tablet (500 mg total) by mouth 2 (two) times daily.    sertraline (ZOLOFT) 50 MG tablet Take 1 tablet (50 mg total) by mouth once daily.    sumatriptan (IMITREX) 50 MG tablet Take 1 tablet (50 mg total) by mouth every 2 (two) hours as needed for Migraine (can repeat once after 2 hours in a 24 hour period).    sulfamethoxazole-trimethoprim 800-160mg (BACTRIM DS) 800-160 mg Tab Take 1 tablet by mouth 2 (two) times daily.           Clinical Reference Information           Your Vitals Were     BP Pulse Height Weight BMI    118/80 (BP Location: Left arm, Patient Position: Sitting, BP Method: Manual) 91 4' 11" (1.499 m) 71.4 kg (157 lb 6.5 oz) 31.79 kg/m2      Blood Pressure          Most Recent Value    BP  118/80      Allergies as of 5/19/2017     No Known " Allergies      Immunizations Administered on Date of Encounter - 5/19/2017     None      Language Assistance Services     ATTENTION: Language assistance services are available, free of charge. Please call [phone].      ATENCIÓN: Si arvind mcguire, tiene a weiner disposición servicios gratuitos de asistencia lingüística. Llame al [phone].     CHÚ Ý: N?u b?n nói Ti?ng Vi?t, có các d?ch v? h? tr? ngôn ng? mi?n phí dành cho b?n. G?i s? [phone].         [facility] [place] complies with applicable Federal civil rights laws and does not discriminate on the basis of race, color, national origin, age, disability, or sex.

## 2017-05-19 NOTE — PROGRESS NOTES
Subjective:       Patient ID: Suzanna Cai is a 22 y.o. female.    Chief Complaint: Follow-up    HPI   Pt here for f/u of EUGENE - started on zoloft and reports doing well with this. Sleep and concentration have improved. No SE.   Reports last week started with cold. 7 days ago with sinus congestion, cough with minimal yellow mucous.   No fevers. Reports is improving at this point.     Review of Systems   Constitutional: Negative for activity change and unexpected weight change.   HENT: Positive for congestion and rhinorrhea. Negative for hearing loss and trouble swallowing.    Eyes: Negative for discharge and visual disturbance.   Respiratory: Positive for cough. Negative for chest tightness, shortness of breath and wheezing.    Cardiovascular: Negative for chest pain and palpitations.   Gastrointestinal: Negative for blood in stool, constipation, diarrhea and vomiting.   Endocrine: Negative for polydipsia and polyuria.   Genitourinary: Negative for difficulty urinating, dysuria, hematuria and menstrual problem.   Musculoskeletal: Negative for arthralgias and joint swelling.   Neurological: Negative for light-headedness and numbness.   Psychiatric/Behavioral: Negative for confusion and dysphoric mood.       Objective:      Physical Exam   Constitutional: She is oriented to person, place, and time. She appears well-developed and well-nourished.   HENT:   Head: Normocephalic and atraumatic.   Right Ear: External ear normal.   Left Ear: External ear normal.   Mouth/Throat: Oropharynx is clear and moist. No oropharyngeal exudate.   Mild edema and redness of nasal turbinates, no drainage   Eyes: Conjunctivae and EOM are normal.   Neck: Neck supple.   Cardiovascular: Normal rate, regular rhythm, normal heart sounds and intact distal pulses.    Pulmonary/Chest: Effort normal and breath sounds normal.   Abdominal: Soft. Normal appearance and bowel sounds are normal.   Musculoskeletal: She exhibits no edema or tenderness.    Lymphadenopathy:     She has no cervical adenopathy.   Neurological: She is alert and oriented to person, place, and time. She has normal strength. Gait normal.   Skin: Skin is warm, dry and intact. No cyanosis. Nails show no clubbing.   Psychiatric: She has a normal mood and affect. Her speech is normal and behavior is normal. Judgment and thought content normal. Cognition and memory are normal.       Assessment:     Suzanna was seen today for follow-up.    Diagnoses and all orders for this visit:    Upper respiratory tract infection, unspecified type: resolving, otc meds discussed, er and rtc prompts     EUGENE (generalized anxiety disorder): controlled, cont zoloft    Other orders  -     sertraline (ZOLOFT) 50 MG tablet; Take 1 tablet (50 mg total) by mouth once daily.

## 2017-05-22 RX ORDER — CETIRIZINE HYDROCHLORIDE 10 MG/1
10 TABLET ORAL DAILY
Qty: 90 TABLET | Refills: 3 | Status: SHIPPED | OUTPATIENT
Start: 2017-05-22 | End: 2018-07-28

## 2017-06-20 ENCOUNTER — TELEPHONE (OUTPATIENT)
Dept: OBSTETRICS AND GYNECOLOGY | Facility: CLINIC | Age: 22
End: 2017-06-20

## 2017-06-20 ENCOUNTER — OFFICE VISIT (OUTPATIENT)
Dept: OBSTETRICS AND GYNECOLOGY | Facility: CLINIC | Age: 22
End: 2017-06-20
Attending: OBSTETRICS & GYNECOLOGY
Payer: OTHER GOVERNMENT

## 2017-06-20 ENCOUNTER — OFFICE VISIT (OUTPATIENT)
Dept: NEUROLOGY | Facility: CLINIC | Age: 22
End: 2017-06-20
Attending: PSYCHIATRY & NEUROLOGY
Payer: OTHER GOVERNMENT

## 2017-06-20 VITALS
SYSTOLIC BLOOD PRESSURE: 136 MMHG | HEIGHT: 60 IN | BODY MASS INDEX: 31.2 KG/M2 | WEIGHT: 158.94 LBS | DIASTOLIC BLOOD PRESSURE: 78 MMHG

## 2017-06-20 VITALS
BODY MASS INDEX: 31.12 KG/M2 | HEART RATE: 70 BPM | SYSTOLIC BLOOD PRESSURE: 124 MMHG | HEIGHT: 60 IN | DIASTOLIC BLOOD PRESSURE: 80 MMHG | WEIGHT: 158.5 LBS

## 2017-06-20 DIAGNOSIS — G43.109 MIGRAINE WITH AURA AND WITHOUT STATUS MIGRAINOSUS, NOT INTRACTABLE: ICD-10-CM

## 2017-06-20 DIAGNOSIS — Z30.09 GENERAL COUNSELING AND ADVICE ON FEMALE CONTRACEPTION: Primary | ICD-10-CM

## 2017-06-20 PROCEDURE — 99213 OFFICE O/P EST LOW 20 MIN: CPT | Mod: PBBFAC | Performed by: PSYCHIATRY & NEUROLOGY

## 2017-06-20 PROCEDURE — 99213 OFFICE O/P EST LOW 20 MIN: CPT | Mod: S$PBB,,, | Performed by: PSYCHIATRY & NEUROLOGY

## 2017-06-20 PROCEDURE — 99999 PR PBB SHADOW E&M-EST. PATIENT-LVL III: CPT | Mod: PBBFAC,,, | Performed by: PSYCHIATRY & NEUROLOGY

## 2017-06-20 PROCEDURE — 99213 OFFICE O/P EST LOW 20 MIN: CPT | Mod: S$PBB,,, | Performed by: OBSTETRICS & GYNECOLOGY

## 2017-06-20 PROCEDURE — 99213 OFFICE O/P EST LOW 20 MIN: CPT | Mod: PBBFAC,27 | Performed by: OBSTETRICS & GYNECOLOGY

## 2017-06-20 PROCEDURE — 99999 PR PBB SHADOW E&M-EST. PATIENT-LVL III: CPT | Mod: PBBFAC,,, | Performed by: OBSTETRICS & GYNECOLOGY

## 2017-06-20 RX ORDER — SUMATRIPTAN 50 MG/1
50 TABLET, FILM COATED ORAL
Qty: 8 TABLET | Refills: 5 | Status: SHIPPED | OUTPATIENT
Start: 2017-06-20 | End: 2018-07-28

## 2017-06-20 RX ORDER — NORETHINDRONE ACETATE AND ETHINYL ESTRADIOL 1.5-30(21)
1 KIT ORAL DAILY
Qty: 28 TABLET | Refills: 12 | Status: SHIPPED | OUTPATIENT
Start: 2017-06-20 | End: 2017-07-18 | Stop reason: DRUGHIGH

## 2017-06-20 RX ORDER — SUMATRIPTAN 50 MG/1
50 TABLET, FILM COATED ORAL
Qty: 8 TABLET | Refills: 5 | Status: SHIPPED | OUTPATIENT
Start: 2017-06-20 | End: 2017-06-20 | Stop reason: SDUPTHER

## 2017-06-20 NOTE — TELEPHONE ENCOUNTER
----- Message from Todd Cuenca sent at 6/20/2017  9:02 AM CDT -----  Contact: Pt  X_ 1st Request  _ 2nd Request  _ 3rd Request    Who: ELIZ ALBA [[id number]]    Why: Patient would like to to speak with staff in regards to wanting to switch her depo to an IUD. Patient has a consult with Dr. Murphy today, but would like to have Dr. Streeter opinion on the matter as well.    What Number to Call Back: [phone]    When to Expect a call back: (Before the end of the day)  -- if call after 3:00 call back will be tomorrow.

## 2017-06-20 NOTE — TELEPHONE ENCOUNTER
----- Message from Todd Cuenca sent at 6/20/2017  4:39 PM CDT -----  Contact: Pt  X_ 1st Request  _ 2nd Request  _ 3rd Request    Who:ELIZ ALBA [[id number]]    Why: Patient would like to speak with staff in regards to birth control pills she was prescribed. Patient would like to know if she start now or wait until Sunday to take the first pill    What Number to Call Back: [phone]    When to Expect a call back: (Before the end of the day)  -- if call after 3:00 call back will be tomorrow.

## 2017-06-20 NOTE — PATIENT INSTRUCTIONS
-migraine journal: consider an mobile application like [employer] and migraine buddy  --lifestyle modifications: sleep, diet, exercise, reviewed  -sleep hygiene reviewed  -Vitamin D3 50,000 IU weekly  -Prevenative: Magnesium supplementation 400mg daily  -Abortive: Excedrin migraine(it is has caffiene, use before noon), and sumatriptan 50mg as needed    -Brain Health lifestyle modifications:    -sleep hygiene   - diet: Mediterranean Diet    -exercise: 20-30 minutes of  Moderate exercise 3-5 times a week   -stress management reviewed   -smoking cessation, alcohol moderation    Check out my blog post for further information:  [email or web address]    Sleep Hygiene:    1. Avoid watching TV, eating, and discussing emotional issues in bed. The bed should be used for sleep and sex only. If not, we can associate the bed with other activities and it often becomes difficult to fall asleep.  2. Minimize noise, light, and temperature extremes during sleep with ear plugs, window blinds, or an electric blanket or air conditioner. Even the slightest nighttime noises or luminescent lights can disrupt the quality of your sleep. Try to keep your bedroom at a comfortable temperature -- not too hot (above 75 degrees) or too cold (below 54 degrees).  3. Try not to drink fluids after 8 p.m. This may reduce awakenings due to urination.  4. Avoid naps, but if you do nap, make it no more than about 25 minutes about eight hours after you awake. But if you have problems falling asleep, then no naps for you.  5. Do not expose your self to bright light if you need to get up at night. Use a small night-light instead.  6. Nicotine is a stimulant and should be avoided particularly near bedtime and upon night awakenings. Having a smoke before bed, although it may feel relaxing, is actually putting a stimulant into your bloodstream.  7. Caffeine is also a stimulant and is present in coffee  (100-200 mg), soda (50-75 mg), tea (50-75 mg), and various over-the-counter medications. Caffeine should be discontinued at least four to six hours before bedtime. If you consume large amounts of caffeine and you cut your self off too quickly, beware; you may get headaches that could keep you awake.  8. Although alcohol is a depressant and may help you fall asleep, the subsequent metabolism that clears it from your body when you are sleeping causes a withdrawal syndrome. This withdrawal causes awakenings and is often associated with nightmares and sweats.  9. A light snack may be sleep-inducing, but a heavy meal too close to bedtime interferes with sleep. Stay away from protein and stick to carbohydrates or dairy products. Milk contains the amino acid L-tryptophan, which has been shown in research to help people go to sleep. So milk and cookies or crackers (without chocolate) may be useful and taste good as well.

## 2017-06-20 NOTE — TELEPHONE ENCOUNTER
Called pt and informed her that she can start taking her OCP today. Told pt that Dr. Murphy instructed her to throw away Sunday's pill and start by taking Monday and Tuesday's pill. Pt communicated understanding.
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
## 2017-06-20 NOTE — PROGRESS NOTES
Suzanna Cai is a 22 y.o. female patient  who presents today to discuss contraception. She was first placed on OCP's in her early teens, both because she became sexually active at age 14 as well as because of very heavy painful periods. She has been on Depo Provera injections with no periods since about age 19 because she frequently had BTB on the pill and would begin her periods during week 3 of the pack and bleed into the placebo week as well.  She was recently advised that being on Depo Provera for many years might be contributing to her weight gain as well as to increasing risk of earlier osteoporosis, so she is interested in other long acting contraception and wishes to discuss her options. Her last Depo injection was 15 weeks ago and she has not yet had a cycle.    No LMP recorded (lmp unknown). Patient has had an injection.    Past Medical History:   Diagnosis Date    Allergy     Blood clotting tendency     Frequent headaches     has MRI  and neg per old records    History of concussion     Scoliosis     Urticaria      History reviewed. No pertinent surgical history.  Social History     Social History    Marital status:      Spouse name: N/A    Number of children: N/A    Years of education: N/A     Occupational History          Social History Main Topics    Smoking status: Never Smoker    Smokeless tobacco: Never Used    Alcohol use Yes      Comment: occ    Drug use: No    Sexual activity: Yes     Partners: Male     Birth control/ protection: Injection      Comment:  since 2016: sexually active since age 14     Other Topics Concern    Not on file     Social History Narrative    From [place], [place]    Moved to [facility] 2016    , living with  who is in     Not exercising     Family History   Problem Relation Age of Onset    Ovarian cancer Maternal Grandmother 66    Hypertension Maternal Grandmother     Diabetes Maternal  "Grandmother     No Known Problems Mother     Hypertension Father     Eczema Sister     Ulcers Sister     Scoliosis Sister     Psoriasis Sister     No Known Problems Brother     No Known Problems Maternal Aunt     No Known Problems Maternal Uncle     No Known Problems Paternal Aunt     No Known Problems Paternal Uncle     Hypertension Maternal Grandfather     Hypertension Paternal Grandmother     No Known Problems Paternal Grandfather     Breast cancer Neg Hx     Colon cancer Neg Hx     Melanoma Neg Hx     Lupus Neg Hx     Amblyopia Neg Hx     Blindness Neg Hx     Cancer Neg Hx     Cataracts Neg Hx     Glaucoma Neg Hx     Macular degeneration Neg Hx     Retinal detachment Neg Hx     Strabismus Neg Hx     Stroke Neg Hx     Thyroid disease Neg Hx      OB History      Para Term  AB Living    0 0 0 0 0 0    SAB TAB Ectopic Multiple Live Births    0 0 0 0                 ROS:  GENERAL: Feeling well overall.   SKIN: Denies rash or lesions.   HEAD: Denies head injury or headache.   NODES: Denies enlarged lymph nodes.   CHEST: Denies chest pain or shortness of breath.   CARDIOVASCULAR: Denies palpitations or left sided chest pain.   ABDOMEN: No abdominal pain, nausea, vomiting or rectal bleeding.   URINARY: No dysuria, hematuria, or burning on urination.  REPRODUCTIVE: See HPI.   BREASTS: Denies pain, lumps, or nipple discharge.   HEMATOLOGIC: No easy bruisability or excessive bleeding.   MUSCULOSKELETAL: Denies joint pain or swelling.   NEUROLOGIC: Denies syncope or weakness.   PSYCHIATRIC: Denies depression.    /78   Ht 4' 11.5" (1.511 m)   Wt 72.1 kg (158 lb 15.2 oz)   LMP  (LMP Unknown)   BMI 31.57 kg/m²     PE:   APPEARANCE: Well nourished, well developed, in no acute distress.  PE: DEFERRED    PROCEDURES:N/A    1. General counseling and advice on female contraception  norethindrone-ethinyl estradiol-iron (MICROGESTIN FE1.5/30) 1.5 mg-30 mcg (21)/75 mg (7) tablet    " AND PLAN:  Patient was counseled today on contraceptive options--Pills, Depo-Provera, IUD, Nexplanon, & Nuva Ring. We discussed the advantages and disadvantages of each. We discussed the continued use of condoms to prevent STDs. The patient elected to use Janis IUD. The session lasted approximately 25 minutes, greater than 50% was counseling. All her questions were answered.    Will begin a pack of pills in order to induce a cycle, prior to beginning /placement of IUD. Advised to use condoms for the next 3 weeks.  Return if symptoms worsen or fail to improve.

## 2017-06-20 NOTE — TELEPHONE ENCOUNTER
Patient would like to changed to the  Mirena IUD for contraception,patient reports her last Depo Provera shot was 3 months ago,patient was informed that we would need to verify coverage for Mirena IUD with her insurance company first before proceeding with appointment,patient states that she would like to keep her appt on today with Dr. Murphy to discuss birth control option in person.

## 2017-06-29 ENCOUNTER — TELEPHONE (OUTPATIENT)
Dept: OBSTETRICS AND GYNECOLOGY | Facility: CLINIC | Age: 22
End: 2017-06-29

## 2017-06-29 NOTE — TELEPHONE ENCOUNTER
Left message requesting call back at [phone]. Received response from [employer] stating  would not release details of pt's benefits to those who do not hold the policy. Called to advise pt to call her insurer and inquire of her coverage for a Janis insertion. PCT code: 77090 or 59939. Diagnosis code: [id number].

## 2017-06-29 NOTE — TELEPHONE ENCOUNTER
----- Message from Diamond Gordon sent at 6/29/2017  3:04 PM CDT -----  Contact: self  _x  1st Request  _  2nd Request  _  3rd Request    Who: ELIZ ALBA [[id number]]    Why: pt returning call..    What Number to Call Back: [phone]    When to Expect a call back: (Before the end of the day)   -- if call after 3:00 call back will be tomorrow.

## 2017-06-29 NOTE — TELEPHONE ENCOUNTER
YVROSE Vuaghn spoke to pt and communicated message to her. Pt said she will call her insurance company and then call the office back and ask to speak with either myself or YVROSE Vaughn.

## 2017-06-30 ENCOUNTER — TELEPHONE (OUTPATIENT)
Dept: OBSTETRICS AND GYNECOLOGY | Facility: CLINIC | Age: 22
End: 2017-06-30

## 2017-06-30 NOTE — TELEPHONE ENCOUNTER
----- Message from Sonam Delaney sent at 6/30/2017  1:59 PM CDT -----  x_  1st Request  _  2nd Request  _  3rd Request        Who: zoe    Why: pt. Returning phone call to speak with .please call to discuss    What Number to Call Back:[phone]    When to Expect a call back: (With in 24 hours)

## 2017-06-30 NOTE — TELEPHONE ENCOUNTER
Pt says that she called her insurance company and was told that she will be covered for the insertion of the Sklya and would like to proceed. Confirmed her already scheduled appt for 7/24/2015.     Pt says that Dr. Murphy instructed her to call if she nears the end of her birth control pack and her period has not started yet. Encouraged pt to follow those instructions and also advised her to call us once her period begins so that we know if her appt needs to be changed. Pt communicated understanding.     Pt says that she has been feeling faint since she began taking her OCP prescription. When asked, pt states that she takes her OCP daily at the same time each day but not with her meals. Encouraged pt to take the prescription with her meals and closer to her bedtime to reduce that symptom. Pt communicated understanding.

## 2017-07-17 ENCOUNTER — TELEPHONE (OUTPATIENT)
Dept: OBSTETRICS AND GYNECOLOGY | Facility: CLINIC | Age: 22
End: 2017-07-17

## 2017-07-17 NOTE — TELEPHONE ENCOUNTER
Pt called and stated that her cycle came down on Saturday and she wasn't suppose to have her IUD inserted until 07-24. Pt is scheduled to come in on tomorrow 07/18 to have bibi IUD inserted.

## 2017-07-17 NOTE — TELEPHONE ENCOUNTER
----- Message from Zainab Nayak sent at 7/17/2017 10:16 AM CDT -----  Contact: Self  Pt is calling in regards of stating that her cycle came down on Saturday and is scheduled for her IUD insertion on 07/24/2017. The pt can be reached at [phone]. Thanks kG

## 2017-07-18 ENCOUNTER — PROCEDURE VISIT (OUTPATIENT)
Dept: OBSTETRICS AND GYNECOLOGY | Facility: CLINIC | Age: 22
End: 2017-07-18
Attending: OBSTETRICS & GYNECOLOGY
Payer: OTHER GOVERNMENT

## 2017-07-18 ENCOUNTER — TELEPHONE (OUTPATIENT)
Dept: OBSTETRICS AND GYNECOLOGY | Facility: CLINIC | Age: 22
End: 2017-07-18

## 2017-07-18 VITALS
SYSTOLIC BLOOD PRESSURE: 122 MMHG | BODY MASS INDEX: 30.21 KG/M2 | HEIGHT: 60 IN | WEIGHT: 153.88 LBS | DIASTOLIC BLOOD PRESSURE: 78 MMHG

## 2017-07-18 DIAGNOSIS — Z30.430 ENCOUNTER FOR INSERTION OF INTRAUTERINE CONTRACEPTIVE DEVICE: Primary | ICD-10-CM

## 2017-07-18 LAB
C TRACH DNA SPEC QL NAA+PROBE: NOT DETECTED
N GONORRHOEA DNA SPEC QL NAA+PROBE: NOT DETECTED

## 2017-07-18 PROCEDURE — 87591 N.GONORRHOEAE DNA AMP PROB: CPT

## 2017-07-18 PROCEDURE — 58300 INSERT INTRAUTERINE DEVICE: CPT | Mod: PBBFAC | Performed by: OBSTETRICS & GYNECOLOGY

## 2017-07-18 RX ADMIN — LEVONORGESTREL 14 MCG: 13.5 INTRAUTERINE DEVICE INTRAUTERINE at 09:07

## 2017-07-18 NOTE — PROCEDURES
INSERTION OF INTRAUTERINE DEVICE  Date/Time: 2017 9:58 AM  Performed by: BONIFACIO ASHLEY  Authorized by: BONIFACIO ASHLEY   Preparation: Patient was prepped and draped in the usual sterile fashion.  Local anesthesia used: no    Anesthesia:  Local anesthesia used: no    Sedation:  Patient sedated: no  Patient tolerance: Patient tolerated the procedure well with no immediate complications  Comments: CC: IUD PLACEMENT    JULIUS Cai is a 22 y.o. female   Patient's last menstrual period was 07/15/2017.presents for an IUD placement.  UPT is negative.        PRE-IUD PLACEMENT COUNSELING:  All contraceptive options were reviewed and the patient chooses an IUD.  The patient's history was reviewed and there are no contraindications to an IUD. The procedure and minimal risks of pain, bleeding, perforation and infection at the insertion and spontaneous expulsion within the first two weeks was discussed. The benefits of amenorrhea and no systemic side effects were explained. All questions were answered and the patient agrees to proceed. Consent was signed (scanned into computer).    EXAM:  Uterine Position: retroverted    PROCEDURE:  TIME OUT PERFORMED.  The cervix visualized with a speculum.  A single tooth tenaculum placed on the anterior lip.  The uterus sounded to 7 cm using sterile technique.  A Janis, Lot # [id number], Expiration date 10/19 JANIS IUD was loaded and placed high in uterine fundus without difficulty using sterile technique.  The string was cut to 2cm length from exo cervix.  The tenaculum and speculum were removed. The patient tolerated the procedure well.    ASSESSMENT:  1. Contraception management / IUD insertion.V25.0.    POST IUD PLACEMENT COUNSELING:  Manage post IUD placement pain with NSAIDs, Tylenol or Rx per MedCard.  IUD danger signs and how to check the strings.  Removal in 5 years for Mirena IUD and in 10 years for Copper IUD.    Counseling lasted approximately 15  minutes and all her questions were answered.    FOLLOW-UP: With me in two weeks.

## 2017-07-18 NOTE — PATIENT INSTRUCTIONS
POST IUD PLACEMENT COUNSELING:  Manage post IUD placement pain with NSAIDs, Tylenol or Rx per MedCard.  IUD danger signs and how to check the strings.  Removal in 3years for ELIA IUD and in 10 years for Copper IUD.

## 2017-07-18 NOTE — TELEPHONE ENCOUNTER
Spoke with Paresh from [facility]. Called to see if pt was approved for her Janis IUD. They both informed me that pt is covered to have Janis IUD inserted and the device is covered. No further questions asked.

## 2017-07-25 ENCOUNTER — TELEPHONE (OUTPATIENT)
Dept: OBSTETRICS AND GYNECOLOGY | Facility: CLINIC | Age: 22
End: 2017-07-25

## 2017-07-25 NOTE — TELEPHONE ENCOUNTER
Pt called and had questions in regards to her IUD insertion.      First questions pt had was if she would start cramping when she comes to get her string checked? Informed pt that she will not cramp, Dr. Murphy is only checking to make sure she sees her strings.    Pt second question was if she should still be bleeding. Pt was informed that she could have bleeding for up to  2-3 months. Communicated understanding.

## 2017-07-25 NOTE — TELEPHONE ENCOUNTER
----- Message from Glory Horner sent at 7/25/2017  2:36 PM CDT -----  Contact: Patient  X _1st Request  _  2nd Request  _  3rd Request    Who:ELIZ ALBA [[id number]]    Why:Patient has some questions concerning her procedure and she is still bleeding and she wanted to see if that was suppose to be happening     What Number to Call Back:[phone]    When to Expect a call back: (Before the end of the day)   -- if call after 3:00 call back will be tomorrow.

## 2017-08-08 ENCOUNTER — OFFICE VISIT (OUTPATIENT)
Dept: OBSTETRICS AND GYNECOLOGY | Facility: CLINIC | Age: 22
End: 2017-08-08
Attending: OBSTETRICS & GYNECOLOGY
Payer: OTHER GOVERNMENT

## 2017-08-08 VITALS
SYSTOLIC BLOOD PRESSURE: 122 MMHG | BODY MASS INDEX: 31.55 KG/M2 | DIASTOLIC BLOOD PRESSURE: 76 MMHG | WEIGHT: 156.5 LBS | HEIGHT: 59 IN

## 2017-08-08 DIAGNOSIS — N64.4 BREAST PAIN: Primary | ICD-10-CM

## 2017-08-08 DIAGNOSIS — Z30.431 INTRAUTERINE CONTRACEPTIVE DEVICE, CHECKING: ICD-10-CM

## 2017-08-08 PROCEDURE — 3008F BODY MASS INDEX DOCD: CPT | Mod: ,,, | Performed by: OBSTETRICS & GYNECOLOGY

## 2017-08-08 PROCEDURE — 99213 OFFICE O/P EST LOW 20 MIN: CPT | Mod: PBBFAC | Performed by: OBSTETRICS & GYNECOLOGY

## 2017-08-08 PROCEDURE — 99999 PR PBB SHADOW E&M-EST. PATIENT-LVL III: CPT | Mod: PBBFAC,,, | Performed by: OBSTETRICS & GYNECOLOGY

## 2017-08-08 PROCEDURE — 99213 OFFICE O/P EST LOW 20 MIN: CPT | Mod: S$PBB,,, | Performed by: OBSTETRICS & GYNECOLOGY

## 2017-08-08 NOTE — PROGRESS NOTES
Suzanna Cai is a 22 y.o. female patient  who presents today with complaints of breast pain for the past several months. It comes and goes, but is not constant. Denies feeling any lumps or having any nipple discharge.  She is still spotting from having the Janis IUD placed . Denies any pain.  Patient's last menstrual period was 2017.    Past Medical History:   Diagnosis Date    Allergy     Blood clotting tendency     Frequent headaches     has MRI  and neg per old records    History of concussion     Scoliosis     Urticaria      History reviewed. No pertinent surgical history.  Social History     Social History    Marital status:      Spouse name: N/A    Number of children: N/A    Years of education: N/A     Occupational History          Social History Main Topics    Smoking status: Never Smoker    Smokeless tobacco: Never Used    Alcohol use Yes      Comment: occ    Drug use: No    Sexual activity: Yes     Partners: Male      Comment:  Janis placed ; since 2016: sexually active since age 14     Other Topics Concern    Not on file     Social History Narrative    From [place], [place]    Moved to [facility] 2016    , living with  who is in     Not exercising     Family History   Problem Relation Age of Onset    Ovarian cancer Maternal Grandmother 66    Hypertension Maternal Grandmother     Diabetes Maternal Grandmother     No Known Problems Mother     Hypertension Father     Eczema Sister     Ulcers Sister     Scoliosis Sister     Psoriasis Sister     No Known Problems Brother     No Known Problems Maternal Aunt     No Known Problems Maternal Uncle     No Known Problems Paternal Aunt     No Known Problems Paternal Uncle     Hypertension Maternal Grandfather     Hypertension Paternal Grandmother     No Known Problems Paternal Grandfather     Breast cancer Cousin     Colon cancer Neg Hx  "    Melanoma Neg Hx     Lupus Neg Hx     Amblyopia Neg Hx     Blindness Neg Hx     Cancer Neg Hx     Cataracts Neg Hx     Glaucoma Neg Hx     Macular degeneration Neg Hx     Retinal detachment Neg Hx     Strabismus Neg Hx     Stroke Neg Hx     Thyroid disease Neg Hx      OB History      Para Term  AB Living    0 0 0 0 0 0    SAB TAB Ectopic Multiple Live Births    0 0 0 0                  ROS:  GENERAL: Feeling well overall.   SKIN: Denies rash or lesions.   HEAD: Denies head injury or headache.   NODES: Denies enlarged lymph nodes.   CHEST: Denies chest pain or shortness of breath.   CARDIOVASCULAR: Denies palpitations or left sided chest pain.   ABDOMEN: No abdominal pain, nausea, vomiting or rectal bleeding.   URINARY: No dysuria, hematuria, or burning on urination.  REPRODUCTIVE: See HPI.   BREASTS: Denies pain, lumps, or nipple discharge.   HEMATOLOGIC: No easy bruisability or excessive bleeding.   MUSCULOSKELETAL: Denies joint pain or swelling.   NEUROLOGIC: Denies syncope or weakness.   PSYCHIATRIC: Denies depression.    /76   Ht 4' 11" (1.499 m)   Wt 71 kg (156 lb 8.4 oz)   LMP 2017   BMI 31.61 kg/m²     PE:   APPEARANCE: Well nourished, well developed, in no acute distress.  BREASTS: Symmetrical, no skin changes or visible lesions.  No palpable masses, nipple discharge bilaterally.      ABDOMEN: Soft. No tenderness or masses. No hernias. No CVA tenderness.  VULVA: No lesions. Normal female genitalia.  URETHRAL MEATUS: Normal size and location, no lesions, no prolapse.  URETHRA: No masses, tenderness, prolapse or scarring.  VAGINA: Moist and well rugated, no discharge, no significant cystocele or rectocele.  CERVIX: No lesions and discharge. IUD STRINGS IN PLACE. SLIGHT PINK DISCHARGE.  UTERUS: Normal size, regular shape, mobile, non-tender, bladder base nontender.  ADNEXA: No masses, tenderness or CDS nodularity.  ANUS PERINEUM: Normal.    PROCEDURES:    1. Breast " pain     2. Intrauterine contraceptive device, checking      AND PLAN:  Advised to stop caffeine , may resume normal activities;    Return if symptoms worsen or fail to improve.

## 2017-09-05 ENCOUNTER — OFFICE VISIT (OUTPATIENT)
Dept: INTERNAL MEDICINE | Facility: CLINIC | Age: 22
End: 2017-09-05
Payer: OTHER GOVERNMENT

## 2017-09-05 VITALS
BODY MASS INDEX: 31.92 KG/M2 | HEIGHT: 59 IN | WEIGHT: 158.31 LBS | DIASTOLIC BLOOD PRESSURE: 62 MMHG | TEMPERATURE: 99 F | SYSTOLIC BLOOD PRESSURE: 132 MMHG

## 2017-09-05 DIAGNOSIS — J02.9 SORE THROAT: ICD-10-CM

## 2017-09-05 DIAGNOSIS — G43.109 MIGRAINE WITH AURA AND WITHOUT STATUS MIGRAINOSUS, NOT INTRACTABLE: Primary | ICD-10-CM

## 2017-09-05 LAB
CTP QC/QA: YES
S PYO RRNA THROAT QL PROBE: NEGATIVE

## 2017-09-05 PROCEDURE — S0119 ONDANSETRON 4 MG: HCPCS | Mod: PBBFAC

## 2017-09-05 PROCEDURE — 99213 OFFICE O/P EST LOW 20 MIN: CPT | Mod: S$PBB,,, | Performed by: NURSE PRACTITIONER

## 2017-09-05 PROCEDURE — 3008F BODY MASS INDEX DOCD: CPT | Mod: ,,, | Performed by: NURSE PRACTITIONER

## 2017-09-05 PROCEDURE — 99999 PR PBB SHADOW E&M-EST. PATIENT-LVL III: CPT | Mod: PBBFAC,,, | Performed by: NURSE PRACTITIONER

## 2017-09-05 PROCEDURE — 96372 THER/PROPH/DIAG INJ SC/IM: CPT | Mod: PBBFAC

## 2017-09-05 PROCEDURE — 87880 STREP A ASSAY W/OPTIC: CPT | Mod: PBBFAC | Performed by: NURSE PRACTITIONER

## 2017-09-05 PROCEDURE — 99213 OFFICE O/P EST LOW 20 MIN: CPT | Mod: PBBFAC | Performed by: NURSE PRACTITIONER

## 2017-09-05 RX ORDER — KETOROLAC TROMETHAMINE 30 MG/ML
60 INJECTION, SOLUTION INTRAMUSCULAR; INTRAVENOUS
Status: COMPLETED | OUTPATIENT
Start: 2017-09-05 | End: 2017-09-05

## 2017-09-05 RX ORDER — ONDANSETRON 4 MG/1
8 TABLET, FILM COATED ORAL
Status: COMPLETED | OUTPATIENT
Start: 2017-09-05 | End: 2017-09-05

## 2017-09-05 RX ORDER — ONDANSETRON 4 MG/1
4-8 TABLET, ORALLY DISINTEGRATING ORAL EVERY 8 HOURS PRN
Qty: 30 TABLET | Refills: 0 | Status: SHIPPED | OUTPATIENT
Start: 2017-09-05 | End: 2017-09-19

## 2017-09-05 RX ADMIN — KETOROLAC TROMETHAMINE 60 MG: 30 INJECTION, SOLUTION INTRAMUSCULAR; INTRAVENOUS at 01:09

## 2017-09-05 RX ADMIN — ONDANSETRON 8 MG: 4 TABLET, FILM COATED ORAL at 01:09

## 2017-09-05 NOTE — LETTER
September 5, 2017                   [facility]  Internal Medicine  [address]  [facility] [address]  Phone: [phone]  Fax: [phone]   September 5, 2017     Patient: Suzanna Cai   YOB: 1995   Date of Visit: 9/5/2017       To Whom it May Concern:    Suzanna Cai was seen in my clinic on 9/5/2017. She may return to work on 9/6/17.    If you have any questions or concerns, please don't hesitate to call.    Sincerely,         Brooke Dean, NP

## 2017-09-05 NOTE — PROGRESS NOTES
Name and  verified client was instructed to stay in clinic for 15 min and report any difficulties  Pain scale 8-10   Patient was instructed to call back with updated pain scale

## 2017-09-05 NOTE — PROGRESS NOTES
Subjective:       Patient ID: Suzanna Cai is a 22 y.o. female.    Chief Complaint: Headache and Fever    Ms. Cai presents today for a headache that began last night. She took 1 Imitrex tab at 10 AM today without relief. She reports associated nausea. She also reports a fever (99.0F) and low BP (does not recall numbers) reading this AM.       Headache    This is a recurrent problem. The current episode started yesterday. The problem has been unchanged. The pain is located in the frontal and bilateral region. The pain does not radiate. The pain quality is similar to prior headaches. The quality of the pain is described as aching and sharp. The pain is at a severity of 10/10. The pain is severe. Associated symptoms include a fever, nausea, neck pain, phonophobia, photophobia and a sore throat. Pertinent negatives include no abdominal pain, anorexia, back pain, blurred vision, dizziness, ear pain, eye watering, muscle aches, rhinorrhea, seizures, sinus pressure, swollen glands, tinnitus, visual change or weakness. She has tried ergotamines for the symptoms. The treatment provided no relief.     Review of Systems   Constitutional: Positive for fever.   HENT: Positive for sore throat. Negative for ear pain, rhinorrhea, sinus pressure and tinnitus.    Eyes: Positive for photophobia. Negative for blurred vision.   Respiratory: Negative for shortness of breath.    Cardiovascular: Positive for chest pain.   Gastrointestinal: Positive for nausea. Negative for abdominal pain and anorexia.   Genitourinary: Negative for dysuria.   Musculoskeletal: Positive for neck pain. Negative for back pain.   Skin: Negative for rash.   Neurological: Positive for headaches. Negative for dizziness, seizures and weakness.   Psychiatric/Behavioral: Negative for confusion.       Objective:      Physical Exam   Constitutional: She is oriented to person, place, and time. She appears well-developed and well-nourished. No distress.   HENT:    Mouth/Throat: No oropharyngeal exudate, posterior oropharyngeal edema, posterior oropharyngeal erythema or tonsillar abscesses. Tonsils are 1+ on the right. Tonsils are 1+ on the left. No tonsillar exudate.   Eyes: EOM are normal. Pupils are equal, round, and reactive to light. Right eye exhibits no discharge. Left eye exhibits no discharge. No scleral icterus.   Neck: Normal range of motion. Neck supple.   Cardiovascular: Normal rate, regular rhythm and normal heart sounds.    Pulmonary/Chest: Effort normal and breath sounds normal. No respiratory distress.   Lymphadenopathy:     She has cervical adenopathy.   Neurological: She is alert and oriented to person, place, and time.   Skin: Skin is warm and dry. She is not diaphoretic.   Psychiatric: Her behavior is normal.   Nursing note and vitals reviewed.      Assessment:       1. Migraine with aura and without status migrainosus, not intractable    2. Sore throat        Plan:   1. Migraine with aura and without status migrainosus, not intractable  - ketorolac injection 60 mg; Inject 60 mg into the muscle one time.  - ondansetron tablet 8 mg; Take 2 tablets (8 mg total) by mouth one time.  - ondansetron (ZOFRAN-ODT) 4 MG TbDL; Take 1-2 tablets (4-8 mg total) by mouth every 8 (eight) hours as needed.  Dispense: 30 tablet; Refill: 0    2. Sore throat  - POCT rapid strep A      Pt has been given instructions populated from [employer] database and has verbalized understanding of the after visit summary and information contained wherein.    Follow up with a primary care provider. May go to ER for acute shortness of breath, lightheadedness, fever, or any other emergent complaints or changes in condition.

## 2017-09-12 ENCOUNTER — TELEPHONE (OUTPATIENT)
Dept: OBSTETRICS AND GYNECOLOGY | Facility: CLINIC | Age: 22
End: 2017-09-12

## 2017-09-12 NOTE — TELEPHONE ENCOUNTER
Patient states she is having pain in both breast. Patient denies redness to skin and warmth to skin. Patient would like to know if it is due to the bibi IUD. She states the breast pain also occurred when she was on depo. Patient also states she has a discharge with an odor and wants to know if it could be due to bibi. Offered walk in clinic to be seen for discharge. Patient declined. Schedule appt with Dr. Streeter for discharge.

## 2017-09-12 NOTE — TELEPHONE ENCOUNTER
----- Message from Nelli Silva sent at 9/12/2017  4:07 PM CDT -----  Contact: ELIZ ALBA [[id number]]  _x  1st Request  _  2nd Request  _  3rd Request        Who: ELIZ ALBA [[id number]]    Why: patient states she would like a call back in regards to her breast hurting and thinks it may be from the IUD. Patient would like a call back to discuss what she can do for the pain.     What Number to Call Back: [phone]    When to Expect a call back: (Before the end of the day)   -- if call after 3:00 call back will be tomorrow.

## 2017-09-13 NOTE — TELEPHONE ENCOUNTER
I was looking for an appointment to see you but I did not see anything until October. Patient stated she did not want to wait that long and requested Dr. Streeter herself since she saw him before. I informed her I would be sending a message to you however she requested an appointment with him.

## 2017-09-14 ENCOUNTER — TELEPHONE (OUTPATIENT)
Dept: INTERNAL MEDICINE | Facility: CLINIC | Age: 22
End: 2017-09-14

## 2017-09-14 NOTE — TELEPHONE ENCOUNTER
----- Message from Lanny Moore sent at 9/14/2017  1:29 PM CDT -----  _  1st Request  _  2nd Request  _  3rd Request        Who: patient    Why: pt is calling to see if she can get a tetanus shot for school    What Number to Call Back: [phone]  When to Expect a call back: (With in 24 hours)

## 2017-09-14 NOTE — TELEPHONE ENCOUNTER
Pt called in requesting an tdap injection for school .  Order pending   Please advise and authorize

## 2017-09-14 NOTE — TELEPHONE ENCOUNTER
----- Message from Yara Norton sent at 9/14/2017  5:13 PM CDT -----  Contact: Self   Pt missed a phone call from the nurse and would like a call back at her earliest convenience         Pt can be contacted at [phone]

## 2017-09-14 NOTE — TELEPHONE ENCOUNTER
Left message to inform pt that order was placed for Tdap and she can contact office to schedule a NV.

## 2017-09-18 ENCOUNTER — CLINICAL SUPPORT (OUTPATIENT)
Dept: INTERNAL MEDICINE | Facility: CLINIC | Age: 22
End: 2017-09-18
Payer: OTHER GOVERNMENT

## 2017-09-18 DIAGNOSIS — N91.2 AMENORRHEA, UNSPECIFIED: Primary | ICD-10-CM

## 2017-09-18 LAB
B-HCG UR QL: NEGATIVE
CTP QC/QA: YES

## 2017-09-18 PROCEDURE — 90471 IMMUNIZATION ADMIN: CPT | Mod: PBBFAC

## 2017-09-18 PROCEDURE — 90715 TDAP VACCINE 7 YRS/> IM: CPT | Mod: PBBFAC

## 2017-09-18 PROCEDURE — 81025 URINE PREGNANCY TEST: CPT | Mod: PBBFAC

## 2017-09-18 NOTE — PROGRESS NOTES
Patient given Tdap IM in the LD. Patient tolerated well and Band-Aid was applied. Lot#[id number] Exp:04/27/2019. Patient advised to wait in the lobby for 15 min to make sure no adverse reactions occur. Patient states verbal understanding and has no further questions.    Pt had cold symptoms complaining of congestion. Temperature was taking and temp was 98.7.     Pt also requested pregnancy test. Test resulted in negative for pregnancy. Pt given copy of results.

## 2017-09-19 ENCOUNTER — OFFICE VISIT (OUTPATIENT)
Dept: OBSTETRICS AND GYNECOLOGY | Facility: CLINIC | Age: 22
End: 2017-09-19
Attending: OBSTETRICS & GYNECOLOGY
Payer: OTHER GOVERNMENT

## 2017-09-19 VITALS
DIASTOLIC BLOOD PRESSURE: 60 MMHG | SYSTOLIC BLOOD PRESSURE: 130 MMHG | WEIGHT: 157.88 LBS | HEIGHT: 59 IN | BODY MASS INDEX: 31.83 KG/M2

## 2017-09-19 DIAGNOSIS — N76.0 VAGINITIS AND VULVOVAGINITIS: Primary | ICD-10-CM

## 2017-09-19 DIAGNOSIS — N64.4 MASTALGIA: ICD-10-CM

## 2017-09-19 PROCEDURE — 99213 OFFICE O/P EST LOW 20 MIN: CPT | Mod: S$PBB,,, | Performed by: OBSTETRICS & GYNECOLOGY

## 2017-09-19 PROCEDURE — 87660 TRICHOMONAS VAGIN DIR PROBE: CPT

## 2017-09-19 PROCEDURE — 3008F BODY MASS INDEX DOCD: CPT | Mod: ,,, | Performed by: OBSTETRICS & GYNECOLOGY

## 2017-09-19 PROCEDURE — 99213 OFFICE O/P EST LOW 20 MIN: CPT | Mod: PBBFAC | Performed by: OBSTETRICS & GYNECOLOGY

## 2017-09-19 PROCEDURE — 99999 PR PBB SHADOW E&M-EST. PATIENT-LVL III: CPT | Mod: PBBFAC,,, | Performed by: OBSTETRICS & GYNECOLOGY

## 2017-09-19 PROCEDURE — 87480 CANDIDA DNA DIR PROBE: CPT

## 2017-09-19 RX ORDER — TINIDAZOLE 500 MG/1
2 TABLET ORAL
Qty: 8 TABLET | Refills: 0 | Status: SHIPPED | OUTPATIENT
Start: 2017-09-19 | End: 2017-09-21

## 2017-09-19 NOTE — PATIENT INSTRUCTIONS
Self-Help Tips for Vulvar Skin Care    While you are seeking effective treatment for vulvar pain, here are some coping measures to relieve symptoms and prevent further irritation. Even when your symptoms are under control, these guidelines are recommended as a preventive strategy.    Clothing and Laundry  ? Wear all-white cotton underwear.   ? Do not wear pantyhose (wear thigh high or knee high hose instead).   ? Wear loose-fitting pants or skirts.   ? Remove wet bathing suits and exercise clothing promptly.   ? Use dermatologically approved detergent such as Purex or Clear.  ? Double-rinse underwear and any other clothing that comes into contact with the vulva.   ? Do not use fabric softener on undergarments.  Hygiene  ? Use soft, white, unscented toilet paper.   ? Use lukewarm or cool sitz baths to relieve burning and irritation.   ? Avoid getting shampoo on the vulvar area.   ? Do not use bubble bath, feminine hygiene products, or any perfumed creams or soaps.   ? Wash the vulva with cool to lukewarm water only.  ? Rinse the vulva with water after urination.  ? Urinate before the bladder is full.   ? Prevent constipation by adding fiber to your diet (if necessary, use a psyllium product such as Metamucil) and drinking at least 8 glasses of water daily.  ? Use 100% cotton menstrual pads and tampons.  Sexual intercourse  ? Use a lubricant that is water soluble, e.g., Astroglide.  ? Ask your physician for a prescription for a topical anesthestic, e.g., Lidocaine gel 5%. (This may sting for the first 3-5 minutes after application.)  ? Apply ice or a frozen blue gel pack (lunch box size) wrapped in one layer of a hand towel to relieve burning after intercourse.   ? Urinate (to prevent infection) and rinse vulva with cool water after sexual intercourse.  ? Do not use contraceptive creams or spermicides.  Physical Activities  ? Avoid exercises that put direct pressure on the vulva such as bicycle riding and horseback  riding.  ? Limit intense exercises that create a lot of friction in the vulvar area (try lower intensity exercises such as walking).  ? Use a frozen gel pack wrapped in a towel to relieve symptoms after exercise.   ? Enroll in an exercise class such as yoga to learn stretching and relaxation exercises.  ? Don't swim in highly chlorinated pools.   ? Avoid the use of hot tubs.  Everyday Living  ? Use a foam rubber donut for long periods of sitting.   ? If you must sit all day at work, try to intersperse periods of standing (e.g. rearrange your office so that you can stand while you speak on the phone).   ? Learn some relaxation techniques to do during the day (The Relaxation and Stress Reduction Workbook by Fermin Eaton and Sudhakar or The Chronic Pain Control Workbook by Andra are recommended).

## 2017-09-19 NOTE — PROGRESS NOTES
SUBJECTIVE:   22 y.o. female  complains of malodorous vaginal discharge for 2 weeks. No LMP recorded (lmp unknown). Patient has had an injection..  She describes her periods as regular.  She describes other symptoms as erythema of vaginal area.  She is sexually active.  She uses IUD for contraception.    ROS:  GENERAL: No fever, chills, fatigability or weight loss.  VULVAR: No pain, no lesions and no itching.  VAGINAL: No relaxation, no itching, no discharge, no abnormal bleeding and no lesions.  ABDOMEN: No abdominal pain. Denies nausea. Denies vomiting. No diarrhea. No constipation  BREAST: Denies pain. No lumps. No discharge.  URINARY: No incontinence, no nocturia, no frequency and no dysuria.  CARDIOVASCULAR: No chest pain. No shortness of breath. No leg cramps.  NEUROLOGICAL: No headaches. No vision changes.        Vitals:    17 1511   BP: 130/60         OBJECTIVE:   She appears well, afebrile.  Abdomen: benign, soft, nontender, no masses.  VULVA: normal appearing vulva with no masses, tenderness or lesions, Normal external female genitalia, normal urethra, normal urethral meatus  VAGINA:discharge: brown  CERVIX: Normal. IUD string seen.  UTERUSnormal size, contour, position, consistency, mobility, non-tender  ADNEXAno mass, fullness, tenderness    Urine dipstick: not done.    Suzanna was seen today for vaginal discharge and breast pain.    Diagnoses and all orders for this visit:    Vaginitis and vulvovaginitis  -     Vaginosis Screen by DNA Probe  -     tinidazole (TINDAMAX) 500 MG tablet; Take 4 tablets (2 g total) by mouth daily with breakfast.  Reviewed [facility] recommendations.    Mastalgia  Likely hormonal.  Tight bra.

## 2017-09-20 ENCOUNTER — TELEPHONE (OUTPATIENT)
Dept: OBSTETRICS AND GYNECOLOGY | Facility: CLINIC | Age: 22
End: 2017-09-20

## 2017-09-20 LAB
CANDIDA RRNA VAG QL PROBE: NEGATIVE
G VAGINALIS RRNA GENITAL QL PROBE: NEGATIVE
T VAGINALIS RRNA GENITAL QL PROBE: NEGATIVE

## 2017-09-20 NOTE — TELEPHONE ENCOUNTER
----- Message from Micki Silver sent at 9/20/2017  3:48 PM CDT -----  Contact: Alonso with [facility]  x_ 1st Request  _ 2nd Request  _ 3rd Request    Who: Alonso with [facility]    Why: [facility] pharmacy do not carry the pt Rx. Alonso with [facility] states pt RX is needing to be sent to a different pharmacy    What Number to Call Back: [phone]    When to Expect a call back: (Before the end of the day)  -- if call after 3:00 call back will be tomorrow.

## 2017-11-05 ENCOUNTER — HOSPITAL ENCOUNTER (EMERGENCY)
Facility: OTHER | Age: 22
Discharge: HOME | End: 2017-11-05
Attending: EMERGENCY MEDICINE
Payer: OTHER GOVERNMENT

## 2017-11-05 VITALS
SYSTOLIC BLOOD PRESSURE: 118 MMHG | TEMPERATURE: 99 F | HEIGHT: 59 IN | OXYGEN SATURATION: 99 % | DIASTOLIC BLOOD PRESSURE: 75 MMHG | BODY MASS INDEX: 31.45 KG/M2 | HEART RATE: 75 BPM | WEIGHT: 156 LBS | RESPIRATION RATE: 16 BRPM

## 2017-11-05 DIAGNOSIS — J02.9 ACUTE PHARYNGITIS, UNSPECIFIED ETIOLOGY: Primary | ICD-10-CM

## 2017-11-05 LAB
B-HCG UR QL: NEGATIVE
CTP QC/QA: YES
DEPRECATED S PYO AG THROAT QL EIA: NEGATIVE

## 2017-11-05 PROCEDURE — 87081 CULTURE SCREEN ONLY: CPT

## 2017-11-05 PROCEDURE — 96372 THER/PROPH/DIAG INJ SC/IM: CPT

## 2017-11-05 PROCEDURE — 87880 STREP A ASSAY W/OPTIC: CPT

## 2017-11-05 PROCEDURE — 25000003 PHARM REV CODE 250: Performed by: EMERGENCY MEDICINE

## 2017-11-05 PROCEDURE — 87147 CULTURE TYPE IMMUNOLOGIC: CPT

## 2017-11-05 PROCEDURE — 81025 URINE PREGNANCY TEST: CPT | Performed by: EMERGENCY MEDICINE

## 2017-11-05 PROCEDURE — 99283 EMERGENCY DEPT VISIT LOW MDM: CPT | Mod: 25

## 2017-11-05 PROCEDURE — 63600175 PHARM REV CODE 636 W HCPCS: Performed by: EMERGENCY MEDICINE

## 2017-11-05 RX ORDER — AMOXICILLIN 875 MG/1
875 TABLET, FILM COATED ORAL 2 TIMES DAILY
Qty: 14 TABLET | Refills: 0 | Status: SHIPPED | OUTPATIENT
Start: 2017-11-05 | End: 2018-01-25 | Stop reason: ALTCHOICE

## 2017-11-05 RX ORDER — IBUPROFEN 600 MG/1
600 TABLET ORAL EVERY 6 HOURS PRN
Qty: 20 TABLET | Refills: 0 | Status: SHIPPED | OUTPATIENT
Start: 2017-11-05 | End: 2018-07-30

## 2017-11-05 RX ORDER — DEXAMETHASONE SODIUM PHOSPHATE 4 MG/ML
12 INJECTION, SOLUTION INTRA-ARTICULAR; INTRALESIONAL; INTRAMUSCULAR; INTRAVENOUS; SOFT TISSUE
Status: COMPLETED | OUTPATIENT
Start: 2017-11-05 | End: 2017-11-05

## 2017-11-05 RX ORDER — IBUPROFEN 600 MG/1
600 TABLET ORAL
Status: COMPLETED | OUTPATIENT
Start: 2017-11-05 | End: 2017-11-05

## 2017-11-05 RX ADMIN — IBUPROFEN 600 MG: 600 TABLET, FILM COATED ORAL at 07:11

## 2017-11-05 RX ADMIN — DEXAMETHASONE SODIUM PHOSPHATE 12 MG: 4 INJECTION, SOLUTION INTRAMUSCULAR; INTRAVENOUS at 07:11

## 2017-11-05 NOTE — ED PROVIDER NOTES
Encounter Date: 11/5/2017       History     Chief Complaint   Patient presents with    Sore Throat     x1 week pta, denies fever      23 yo F c/o sore throat x one week. Endorses left sided pain that has worsened and is currently severe +odynophagia. No fever, no neck pain, no abdominal pain. No intervention prior to arrival. History of GAS pharyngitis several times in the past. No additional complaints.    Additional past medical, surgical, and social history as outlined in the nursing assessment was reviewed by me.         The history is provided by the patient.     Review of patient's allergies indicates:  No Known Allergies  Past Medical History:   Diagnosis Date    Allergy     Blood clotting tendency     Frequent headaches 2013    has MRI 2013 and neg per old records    History of concussion     Scoliosis     Urticaria      History reviewed. No pertinent surgical history.  Family History   Problem Relation Age of Onset    Ovarian cancer Maternal Grandmother 66    Hypertension Maternal Grandmother     Diabetes Maternal Grandmother     No Known Problems Mother     Hypertension Father     Eczema Sister     Ulcers Sister     Scoliosis Sister     Psoriasis Sister     No Known Problems Brother     No Known Problems Maternal Aunt     No Known Problems Maternal Uncle     No Known Problems Paternal Aunt     No Known Problems Paternal Uncle     Hypertension Maternal Grandfather     Hypertension Paternal Grandmother     No Known Problems Paternal Grandfather     Breast cancer Cousin 34    Colon cancer Neg Hx     Melanoma Neg Hx     Lupus Neg Hx     Amblyopia Neg Hx     Blindness Neg Hx     Cancer Neg Hx     Cataracts Neg Hx     Glaucoma Neg Hx     Macular degeneration Neg Hx     Retinal detachment Neg Hx     Strabismus Neg Hx     Stroke Neg Hx     Thyroid disease Neg Hx      Social History   Substance Use Topics    Smoking status: Never Smoker    Smokeless tobacco: Never Used     Alcohol use Yes      Comment: occ     Review of Systems   Constitutional: Negative for chills and fever.   HENT: Positive for sore throat. Negative for drooling.    Respiratory: Negative for cough and shortness of breath.    Skin: Negative for rash.   Allergic/Immunologic: Negative for immunocompromised state.   Neurological: Negative for weakness and numbness.   Hematological: Does not bruise/bleed easily.   Psychiatric/Behavioral: Negative for confusion.       Physical Exam     Initial Vitals [11/05/17 0606]   BP Pulse Resp Temp SpO2   125/73 81 14 98.7 °F (37.1 °C) 99 %      MAP       90.33         Physical Exam    Nursing note and vitals reviewed.  Constitutional: She appears well-developed and well-nourished. She is not diaphoretic. No distress.   HENT:   Head: Normocephalic and atraumatic.   Right Ear: External ear normal.   Left Ear: External ear normal.   Tonsils enlarged and injected bilaterally with exudates. Uvula midline.    Eyes: Conjunctivae and EOM are normal. Right eye exhibits no discharge. Left eye exhibits no discharge.   Neck: Normal range of motion. Neck supple. No tracheal deviation present.   Cardiovascular: Normal rate, regular rhythm, normal heart sounds and intact distal pulses. Exam reveals no gallop and no friction rub.    No murmur heard.  Pulmonary/Chest: Breath sounds normal. No stridor. No respiratory distress. She has no wheezes. She has no rhonchi. She has no rales.   Abdominal: Soft. Bowel sounds are normal. She exhibits no distension. There is no tenderness. There is no rebound and no guarding.   Musculoskeletal: Normal range of motion. She exhibits no edema or tenderness.   Lymphadenopathy:     She has cervical adenopathy (shotty bilaterally).   Neurological: She is alert and oriented to person, place, and time. She has normal strength. No cranial nerve deficit.   Skin: Skin is warm and dry. Capillary refill takes less than 2 seconds. No erythema. No pallor.   Psychiatric: She  has a normal mood and affect. Her behavior is normal.         ED Course   Procedures  Labs Reviewed   THROAT SCREEN, RAPID   CULTURE, STREP A,  THROAT   POCT URINE PREGNANCY             Medical Decision Making:   Initial Assessment:   Patient presents with sore throat. Exam is consistent with acute pharyngitis; I will test for strep. I will give Decadron and Ibuprofen for symptomatic relief. She has no signs of PTA, retro-, or parapharyngeal abscess. I also doubt EBV infection at this time. I will reassess.   ED Management:  8:35 AM - Pain is improved. Though rapid strep is negative I will treat empirically with Amoxicillin. Culture has been sent. I have discussed with patient the diagnostic results, diagnosis, treatment plan, and need for follow-up. Patient has expressed understanding of my instructions. I am comfortable with her discharge home at this time.                     ED Course      Clinical Impression:     1. Acute pharyngitis, unspecified etiology                                 Tamika Lr MD  11/05/17 0837

## 2017-11-07 LAB
BACTERIA THROAT CULT: NORMAL
BACTERIA THROAT CULT: NORMAL

## 2018-01-19 ENCOUNTER — TELEPHONE (OUTPATIENT)
Dept: INTERNAL MEDICINE | Facility: CLINIC | Age: 23
End: 2018-01-19

## 2018-01-19 NOTE — TELEPHONE ENCOUNTER
----- Message from Silvia Storey sent at 1/19/2018 12:33 PM CST -----  Contact: pt  _  1st Request  _  2nd Request  _  3rd Request        Who: pt    Why: Requesting a call back in regards to needs to speak to regarding to personal reasons. Call pt    What Number to Call Back:[phone]    When to Expect a call back: (Within 24 hours)    Please return the call at earliest convenience. Thanks!

## 2018-01-24 ENCOUNTER — PATIENT MESSAGE (OUTPATIENT)
Dept: OBSTETRICS AND GYNECOLOGY | Facility: CLINIC | Age: 23
End: 2018-01-24

## 2018-01-25 ENCOUNTER — OFFICE VISIT (OUTPATIENT)
Dept: PODIATRY | Facility: CLINIC | Age: 23
End: 2018-01-25
Payer: OTHER GOVERNMENT

## 2018-01-25 VITALS
HEART RATE: 68 BPM | HEIGHT: 59 IN | SYSTOLIC BLOOD PRESSURE: 127 MMHG | WEIGHT: 150 LBS | BODY MASS INDEX: 30.24 KG/M2 | RESPIRATION RATE: 18 BRPM | DIASTOLIC BLOOD PRESSURE: 64 MMHG

## 2018-01-25 DIAGNOSIS — M76.829 TIBIALIS POSTERIOR TENDINITIS, UNSPECIFIED LATERALITY: Primary | ICD-10-CM

## 2018-01-25 PROCEDURE — 99999 PR PBB SHADOW E&M-EST. PATIENT-LVL III: CPT | Mod: PBBFAC,,, | Performed by: PODIATRIST

## 2018-01-25 PROCEDURE — 99213 OFFICE O/P EST LOW 20 MIN: CPT | Mod: PBBFAC | Performed by: PODIATRIST

## 2018-01-25 PROCEDURE — 99203 OFFICE O/P NEW LOW 30 MIN: CPT | Mod: S$PBB,,, | Performed by: PODIATRIST

## 2018-01-25 RX ORDER — MELOXICAM 15 MG/1
15 TABLET ORAL DAILY
Qty: 30 TABLET | Refills: 0 | Status: SHIPPED | OUTPATIENT
Start: 2018-01-25 | End: 2018-07-30

## 2018-01-25 NOTE — PROGRESS NOTES
Subjective:      Patient ID: Suzanna Cai is a 22 y.o. female.    Chief Complaint: PCP (Brooke Dean NP 9/05/17); Foot Problem; Foot Pain; and Flat Foot    Suzanna is a 22 y.o. female who presents to the podiatry clinic  with complaint of  bilateral foot pain. Onset of the symptoms was several months ago. Precipitating event: none known. Current symptoms include: worsening symptoms after a period of activity. Aggravating factors: walking. Symptoms have gradually worsened. Patient has had no prior foot problems. Evaluation to date: none. Treatment to date: none.       Patient Active Problem List   Diagnosis    Migraine with aura and without status migrainosus, not intractable       Current Outpatient Prescriptions on File Prior to Visit   Medication Sig Dispense Refill    cetirizine (ZYRTEC) 10 MG tablet Take 1 tablet (10 mg total) by mouth once daily. 90 tablet 3    ergocalciferol (ERGOCALCIFEROL) 50,000 unit Cap Take 1 capsule (50,000 Units total) by mouth every 7 days. 12 capsule 1    fluocinolone and shower cap (DERMA-SMOOTHE/FS SCALP OIL) 0.01 % Oil Apply oil to damp scalp nightly and cover with shower cap. 1 Bottle 3    ibuprofen (ADVIL,MOTRIN) 600 MG tablet Take 1 tablet (600 mg total) by mouth every 6 (six) hours as needed. 20 tablet 0    ketoconazole (NIZORAL) 2 % shampoo Wash hair with medicated shampoo at least 2x/week - let sit on scalp at least 5 minutes prior to rinsing 120 mL 5    sertraline (ZOLOFT) 50 MG tablet Take 1 tablet (50 mg total) by mouth once daily. 90 tablet 3    epinephrine (EPIPEN 2-DAVID) 0.3 mg/0.3 mL AtIn Inject 0.3 mLs (0.3 mg total) into the muscle once. Prn throat swelling and call 911! 2 each 0    sumatriptan (IMITREX) 50 MG tablet Take 1 tablet (50 mg total) by mouth every 2 (two) hours as needed for Migraine (can repeat once after 2 hours in a 24 hour period). 8 tablet 5    [DISCONTINUED] amoxicillin (AMOXIL) 875 MG tablet Take 1 tablet (875 mg total) by mouth 2  (two) times daily. 14 tablet 0     No current facility-administered medications on file prior to visit.        Review of patient's allergies indicates:  No Known Allergies    No past surgical history on file.    Family History   Problem Relation Age of Onset    Ovarian cancer Maternal Grandmother 66    Hypertension Maternal Grandmother     Diabetes Maternal Grandmother     No Known Problems Mother     Hypertension Father     Eczema Sister     Ulcers Sister     Scoliosis Sister     Psoriasis Sister     No Known Problems Brother     No Known Problems Maternal Aunt     No Known Problems Maternal Uncle     No Known Problems Paternal Aunt     No Known Problems Paternal Uncle     Hypertension Maternal Grandfather     Hypertension Paternal Grandmother     No Known Problems Paternal Grandfather     Breast cancer Cousin 34    Colon cancer Neg Hx     Melanoma Neg Hx     Lupus Neg Hx     Amblyopia Neg Hx     Blindness Neg Hx     Cancer Neg Hx     Cataracts Neg Hx     Glaucoma Neg Hx     Macular degeneration Neg Hx     Retinal detachment Neg Hx     Strabismus Neg Hx     Stroke Neg Hx     Thyroid disease Neg Hx        Social History     Social History    Marital status:      Spouse name: N/A    Number of children: N/A    Years of education: N/A     Occupational History          Social History Main Topics    Smoking status: Never Smoker    Smokeless tobacco: Never Used    Alcohol use Yes      Comment: occ    Drug use: No    Sexual activity: Yes     Partners: Male     Birth control/ protection: IUD      Comment:  Janis placed July 18,2017; since October 2016: sexually active since age 14     Other Topics Concern    Not on file     Social History Narrative    From [place], [place]    Moved to [facility] Nov 2016    , living with  who is in     Not exercising           Review of Systems   Constitution: Negative for chills, decreased appetite and fever.  "  Cardiovascular: Negative for leg swelling.   Musculoskeletal: Negative for arthritis, joint pain, joint swelling and myalgias.   Gastrointestinal: Negative for nausea and vomiting.   Neurological: Negative for loss of balance, numbness and paresthesias.           Objective:       Vitals:    01/25/18 0948   BP: 127/64   Pulse: 68   Resp: 18   Weight: 68 kg (150 lb)   Height: 4' 11" (1.499 m)   PainSc: 0-No pain        Physical Exam   Constitutional: She is oriented to person, place, and time. She appears well-developed and well-nourished.   Cardiovascular:   Pulses:       Dorsalis pedis pulses are 2+ on the right side, and 2+ on the left side.        Posterior tibial pulses are 2+ on the right side, and 2+ on the left side.   Musculoskeletal: She exhibits no edema or tenderness.        Right ankle: Normal.        Left ankle: Normal.        Right foot: There is no swelling, no crepitus and no deformity.        Left foot: There is no swelling, no crepitus and no deformity.   Adequate joint range of motion without pain, limitation, nor crepitation Bilateral feet and ankle joints. Muscle strength is 5/5 in all groups bilatera    TTP insertion TP tendon b/l medial mid foot . No pain w/ ROM or flexion    Lymphadenopathy:   No palpable lymph nodes   Neurological: She is alert and oriented to person, place, and time. She has normal strength.   Skin: Skin is warm, dry and intact. No rash noted. No erythema. Nails show no clubbing.   Psychiatric: She has a normal mood and affect. Her behavior is normal.             Assessment:       Encounter Diagnosis   Name Primary?    Tibialis posterior tendinitis, unspecified laterality Yes         Plan:       Suzanna was seen today for pcp, foot problem, foot pain and flat foot.    Diagnoses and all orders for this visit:    Tibialis posterior tendinitis, unspecified laterality    Other orders  -     meloxicam (MOBIC) 15 MG tablet; Take 1 tablet (15 mg total) by mouth once daily.      I " counseled the patient on her conditions, their implications and medical management.      Patient instructed on adequate icing techniques. Patient should ice the affected area at least once per day x 10 minutes for 10 days . I advised the  patient that extra icing would also be beneficial to ensure adequate anti inflammatory effect     Mobic prescribed. Patient was instructed on dosing information. Discontinue if adverse effects occur     Handout dispensed with information in regards to several type of Spenco Inserts along with purchasing information.      Shoe recommendations: (try 6pm[employer], zappos.[employer] , nordstromrack.[employer], or shoes.[employer] for discounted prices) you can visit DSW shoes in [place] as well    Asics (GT 1000 or gel foundations), new balance, vesta (stabil c3),  Dolan (transcend), vionic, propet (tennis shoe)    soft brand, clarks, crocs, aerosoles, naturalizers, SAS, ecco, alfie, ortiz suarez, rockports (dress shoes)    Vionic, volitiles, burkenstocks, fitflops, propet (sandals)    Nike comfort thong sandals, crocs (house shoes)

## 2018-01-28 ENCOUNTER — HOSPITAL ENCOUNTER (EMERGENCY)
Facility: OTHER | Age: 23
Discharge: HOME | End: 2018-01-28
Attending: EMERGENCY MEDICINE
Payer: OTHER GOVERNMENT

## 2018-01-28 VITALS
TEMPERATURE: 99 F | BODY MASS INDEX: 29.45 KG/M2 | WEIGHT: 150 LBS | RESPIRATION RATE: 18 BRPM | OXYGEN SATURATION: 98 % | HEIGHT: 60 IN | SYSTOLIC BLOOD PRESSURE: 123 MMHG | DIASTOLIC BLOOD PRESSURE: 61 MMHG | HEART RATE: 71 BPM

## 2018-01-28 DIAGNOSIS — R09.A2 FOREIGN BODY SENSATION IN THROAT: Primary | ICD-10-CM

## 2018-01-28 PROCEDURE — 99283 EMERGENCY DEPT VISIT LOW MDM: CPT

## 2018-01-29 NOTE — DISCHARGE INSTRUCTIONS
I do not see anything in the back of the throat.  Likely is a scratch in the back of the throat which can feel as if there is something there.  Should resolve in the next 24 hours.

## 2018-01-29 NOTE — ED PROVIDER NOTES
Encounter Date: 1/28/2018    SCRIBE #1 NOTE: I, Shruti Sanders, am scribing for, and in the presence of, Dr. Cristina.       History     Chief Complaint   Patient presents with    some thing in throat.     pt with c/o some thing  in throat sice lunch today.     Time seen by provider: 6:12 PM    This is a 22 y.o. female who presents with complaint of a foreign body sensation in her throat that began PTA. Pt reports that she ate a boneless pork chop, rice, vegetables, and pork 'n beans PTA. Pt denies numbness, nausea, vomiting, diarrhea, fever, chills , or generalized weakness. Pt reports no there exacerbating or alleviating factors.       The history is provided by the patient.     Review of patient's allergies indicates:  No Known Allergies  Past Medical History:   Diagnosis Date    Allergy     Blood clotting tendency     Frequent headaches 2013    has MRI 2013 and neg per old records    History of concussion     Scoliosis     Urticaria      No past surgical history on file.  Family History   Problem Relation Age of Onset    Ovarian cancer Maternal Grandmother 66    Hypertension Maternal Grandmother     Diabetes Maternal Grandmother     No Known Problems Mother     Hypertension Father     Eczema Sister     Ulcers Sister     Scoliosis Sister     Psoriasis Sister     No Known Problems Brother     No Known Problems Maternal Aunt     No Known Problems Maternal Uncle     No Known Problems Paternal Aunt     No Known Problems Paternal Uncle     Hypertension Maternal Grandfather     Hypertension Paternal Grandmother     No Known Problems Paternal Grandfather     Breast cancer Cousin 34    Colon cancer Neg Hx     Melanoma Neg Hx     Lupus Neg Hx     Amblyopia Neg Hx     Blindness Neg Hx     Cancer Neg Hx     Cataracts Neg Hx     Glaucoma Neg Hx     Macular degeneration Neg Hx     Retinal detachment Neg Hx     Strabismus Neg Hx     Stroke Neg Hx     Thyroid disease Neg Hx      Social  History   Substance Use Topics    Smoking status: Never Smoker    Smokeless tobacco: Never Used    Alcohol use Yes      Comment: occ     Review of Systems   Constitutional: Negative for chills and fever.   HENT: Positive for sore throat. Negative for congestion and rhinorrhea.         Positive for foreign body sensation in throat.    Respiratory: Negative for cough and shortness of breath.    Cardiovascular: Negative for chest pain.   Gastrointestinal: Negative for abdominal pain, diarrhea, nausea and vomiting.   Endocrine: Negative for polyuria.   Genitourinary: Negative for decreased urine volume and dysuria.   Musculoskeletal: Negative for back pain.   Skin: Negative for rash.   Allergic/Immunologic: Negative for immunocompromised state.   Neurological: Negative for dizziness and weakness.   Hematological: Does not bruise/bleed easily.   Psychiatric/Behavioral: Negative for confusion.       Physical Exam     Initial Vitals [01/28/18 1743]   BP Pulse Resp Temp SpO2   123/61 71 18 98.5 °F (36.9 °C) 98 %      MAP       81.67         Physical Exam    Constitutional: She appears well-developed and well-nourished. She is not diaphoretic. No distress.   HENT:   Head: Normocephalic and atraumatic. Head is without raccoon's eyes and without Durham's sign.   Right Ear: External ear normal. No hemotympanum.   Left Ear: External ear normal. No hemotympanum.   Nose: Nose normal. No nasal deformity.   Mouth/Throat: Oropharynx is clear and moist. No oropharyngeal exudate.   Eyes: EOM are normal. Pupils are equal, round, and reactive to light. Right eye exhibits no discharge. Left eye exhibits no discharge.   Neck: Normal range of motion. Neck supple. No stridor present. No spinous process tenderness present. No tracheal deviation present.   Cardiovascular: Regular rhythm, S1 normal, S2 normal and intact distal pulses. Exam reveals no gallop and no friction rub.    No murmur heard.  Abdominal: Soft. Bowel sounds are normal.  She exhibits no distension. There is no tenderness.   Musculoskeletal:   Normal range of motion. No edema or tenderness.    Neurological: She is alert and oriented to person, place, and time. She has normal strength. No cranial nerve deficit or sensory deficit. She exhibits normal muscle tone. Gait normal. GCS eye subscore is 4. GCS verbal subscore is 5. GCS motor subscore is 6.   Skin: Skin is warm and dry. Capillary refill takes less than 2 seconds. No laceration and no rash noted. No pallor.   Psychiatric: Her speech is normal. Thought content normal.         ED Course   Procedures  Labs Reviewed - No data to display          Medical Decision Making:   ED Management:  22-year-old female with foreign body sensation in her throat.  All my examination do not see anything.  The patient did not eat any bones to be stuck.  Able to tolerate by mouth's.  We'll discharge with reassurance.            Scribe Attestation:   Scribe #1: I performed the above scribed service and the documentation accurately describes the services I performed. I attest to the accuracy of the note.    Attending Attestation:           Physician Attestation for Scribe:  Physician Attestation Statement for Scribe #1: I, Dr. Cristina, reviewed documentation, as scribed by Shruti Sanders  in my presence, and it is both accurate and complete.                 ED Course      Clinical Impression:     1. Foreign body sensation in throat                                 Aristeo Cristina,   01/28/18 9798

## 2018-02-01 ENCOUNTER — PATIENT MESSAGE (OUTPATIENT)
Dept: OBSTETRICS AND GYNECOLOGY | Facility: CLINIC | Age: 23
End: 2018-02-01

## 2018-02-05 ENCOUNTER — OFFICE VISIT (OUTPATIENT)
Dept: OBSTETRICS AND GYNECOLOGY | Facility: CLINIC | Age: 23
End: 2018-02-05
Payer: OTHER GOVERNMENT

## 2018-02-05 VITALS
HEIGHT: 59 IN | SYSTOLIC BLOOD PRESSURE: 120 MMHG | WEIGHT: 152.56 LBS | BODY MASS INDEX: 30.76 KG/M2 | DIASTOLIC BLOOD PRESSURE: 70 MMHG

## 2018-02-05 DIAGNOSIS — N89.8 VAGINAL DISCHARGE: Primary | ICD-10-CM

## 2018-02-05 PROCEDURE — 99212 OFFICE O/P EST SF 10 MIN: CPT | Mod: PBBFAC | Performed by: OBSTETRICS & GYNECOLOGY

## 2018-02-05 PROCEDURE — 87480 CANDIDA DNA DIR PROBE: CPT

## 2018-02-05 PROCEDURE — 3008F BODY MASS INDEX DOCD: CPT | Mod: ,,, | Performed by: OBSTETRICS & GYNECOLOGY

## 2018-02-05 PROCEDURE — 99213 OFFICE O/P EST LOW 20 MIN: CPT | Mod: S$PBB,,, | Performed by: OBSTETRICS & GYNECOLOGY

## 2018-02-05 PROCEDURE — 99999 PR PBB SHADOW E&M-EST. PATIENT-LVL II: CPT | Mod: PBBFAC,,, | Performed by: OBSTETRICS & GYNECOLOGY

## 2018-02-06 LAB
CANDIDA RRNA VAG QL PROBE: NEGATIVE
G VAGINALIS RRNA GENITAL QL PROBE: POSITIVE
T VAGINALIS RRNA GENITAL QL PROBE: NEGATIVE

## 2018-02-07 ENCOUNTER — PATIENT MESSAGE (OUTPATIENT)
Dept: OBSTETRICS AND GYNECOLOGY | Facility: CLINIC | Age: 23
End: 2018-02-07

## 2018-02-07 DIAGNOSIS — N76.0 ACUTE VAGINITIS: Primary | ICD-10-CM

## 2018-02-08 RX ORDER — METRONIDAZOLE 500 MG/1
500 TABLET ORAL EVERY 12 HOURS
Qty: 14 TABLET | Refills: 0 | Status: SHIPPED | OUTPATIENT
Start: 2018-02-08 | End: 2018-02-15

## 2018-02-22 NOTE — PROGRESS NOTES
Chief Complaint   Patient presents with    Vaginal Discharge       HPI:  22 y.o. female     Patient's last menstrual period was 01/15/2018.    - Vaginal discharge: YES  - Duration:  ABOUT 1 MONTH  - Odor:   NO  - Irritation  NO  - Genital lesions: NO      Contraception: MIRENA  Pap: 2016, NILM  Mammogram: N/A    Past Medical History:   Diagnosis Date    Allergy     Blood clotting tendency     Frequent headaches     has MRI  and neg per old records    History of concussion     Scoliosis     Urticaria      History reviewed. No pertinent surgical history.    Social History   Substance Use Topics    Smoking status: Never Smoker    Smokeless tobacco: Never Used    Alcohol use Yes      Comment: occ     Family History   Problem Relation Age of Onset    Ovarian cancer Maternal Grandmother 66    Hypertension Maternal Grandmother     Diabetes Maternal Grandmother     No Known Problems Mother     Hypertension Father     Eczema Sister     Ulcers Sister     Scoliosis Sister     Psoriasis Sister     No Known Problems Brother     No Known Problems Maternal Aunt     No Known Problems Maternal Uncle     No Known Problems Paternal Aunt     No Known Problems Paternal Uncle     Hypertension Maternal Grandfather     Hypertension Paternal Grandmother     No Known Problems Paternal Grandfather     Breast cancer Cousin 34    Colon cancer Neg Hx     Melanoma Neg Hx     Lupus Neg Hx     Amblyopia Neg Hx     Blindness Neg Hx     Cancer Neg Hx     Cataracts Neg Hx     Glaucoma Neg Hx     Macular degeneration Neg Hx     Retinal detachment Neg Hx     Strabismus Neg Hx     Stroke Neg Hx     Thyroid disease Neg Hx      OB History    Para Term  AB Living   0 0 0 0 0 0   SAB TAB Ectopic Multiple Live Births   0 0 0 0 0             MEDICATIONS: Reviewed with patient.  ALLERGIES: Patient has no known allergies.     ROS:  Review of Systems   Constitutional: Negative for fever.  "  Respiratory: Negative for shortness of breath.    Cardiovascular: Negative for chest pain.   Gastrointestinal: Negative for abdominal pain, nausea and vomiting.   Genitourinary: Positive for vaginal discharge. Negative for dysuria and vaginal odor.   Neurological: Negative for headaches.       PHYSICAL EXAM:    /70   Ht 4' 11" (1.499 m)   Wt 69.2 kg (152 lb 8.9 oz)   LMP 01/15/2018   BMI 30.81 kg/m²     Physical Exam:   Constitutional: She is oriented to person, place, and time. She appears well-developed.    HENT:   Head: Normocephalic.       Pulmonary/Chest: Effort normal.          Genitourinary: Cervix is normal. Vaginal discharge found.   Genitourinary Comments: External genitalia: Normal  Urethra: No tenderness; normal meatus  Bladder: No tenderness               Neurological: She is alert and oriented to person, place, and time.     Psychiatric: She has a normal mood and affect.         ASSESSMENT & PLAN:   Vaginal discharge  -     Vaginosis Screen by DNA Probe        - Discharge noted on exam  - Will check vaginosis panel and treat based on results    Patient was counseled on vaginitis prevention including:  - avoiding feminine products such as deoderant soaps, body wash, bubble bath, douches, scented toilet paper, deoderant tampons or pads, feminine wipes, chronic pad use, etc.  - avoiding other vulvovaginal irritants such as long hot baths, humidity, tight, synthetic clothing, chlorine and sitting around in wet bathing suits  - wearing cotton underwear, avoiding thong underwear and no underwear to bed  - taking showers instead of baths and use a hair dryer on cool setting afterwards to dry  - showering immediately after exercise and change clothes  - using polyurethane condoms without spermicide if sexually active and symptoms are triggered by intercourse  "

## 2018-04-10 ENCOUNTER — HOSPITAL ENCOUNTER (EMERGENCY)
Facility: OTHER | Age: 23
Discharge: HOME | End: 2018-04-10
Attending: EMERGENCY MEDICINE
Payer: OTHER GOVERNMENT

## 2018-04-10 VITALS
HEIGHT: 60 IN | TEMPERATURE: 100 F | HEART RATE: 76 BPM | RESPIRATION RATE: 16 BRPM | BODY MASS INDEX: 30.43 KG/M2 | DIASTOLIC BLOOD PRESSURE: 66 MMHG | OXYGEN SATURATION: 100 % | WEIGHT: 155 LBS | SYSTOLIC BLOOD PRESSURE: 119 MMHG

## 2018-04-10 DIAGNOSIS — R05.9 COUGH: ICD-10-CM

## 2018-04-10 DIAGNOSIS — J06.9 UPPER RESPIRATORY INFECTION, ACUTE: Primary | ICD-10-CM

## 2018-04-10 LAB
B-HCG UR QL: NEGATIVE
CTP QC/QA: YES

## 2018-04-10 PROCEDURE — 81025 URINE PREGNANCY TEST: CPT | Performed by: EMERGENCY MEDICINE

## 2018-04-10 PROCEDURE — 99284 EMERGENCY DEPT VISIT MOD MDM: CPT

## 2018-04-11 NOTE — ED PROVIDER NOTES
Encounter Date: 4/10/2018    SCRIBE #1 NOTE: I, Laura Jefferson, am scribing for, and in the presence of, Dr. Sidhu.       History     Chief Complaint   Patient presents with    Cough     Pt CO cough Xs 1 week worsening today     Time seen by provider: 8:27 PM    This is a 23 y.o. female who presents with complaint of progressively worsening cough which started about one week ago. Patient reports no exacerbating or alleviating factors of cough. She denies any recent foreign travel. Patient does admit exposure to recent sick contact. She denies fever, chills, nausea, vomiting, abd pain, diarrhea, or constipation. She admits to occasional use of alcohol, but denies use of tobacco or illicit drugs. She reports NKDA. She has no further complaints at this time.      The history is provided by the patient.     Review of patient's allergies indicates:  No Known Allergies  Past Medical History:   Diagnosis Date    Allergy     Blood clotting tendency     Frequent headaches 2013    has MRI 2013 and neg per old records    History of concussion     Scoliosis     Urticaria      History reviewed. No pertinent surgical history.  Family History   Problem Relation Age of Onset    Ovarian cancer Maternal Grandmother 66    Hypertension Maternal Grandmother     Diabetes Maternal Grandmother     No Known Problems Mother     Hypertension Father     Eczema Sister     Ulcers Sister     Scoliosis Sister     Psoriasis Sister     No Known Problems Brother     No Known Problems Maternal Aunt     No Known Problems Maternal Uncle     No Known Problems Paternal Aunt     No Known Problems Paternal Uncle     Hypertension Maternal Grandfather     Hypertension Paternal Grandmother     No Known Problems Paternal Grandfather     Breast cancer Cousin 34    Colon cancer Neg Hx     Melanoma Neg Hx     Lupus Neg Hx     Amblyopia Neg Hx     Blindness Neg Hx     Cancer Neg Hx     Cataracts Neg Hx     Glaucoma Neg Hx      Macular degeneration Neg Hx     Retinal detachment Neg Hx     Strabismus Neg Hx     Stroke Neg Hx     Thyroid disease Neg Hx      Social History   Substance Use Topics    Smoking status: Never Smoker    Smokeless tobacco: Never Used    Alcohol use Yes      Comment: occ     Review of Systems   Constitutional: Negative for chills and fever.   HENT: Negative for sore throat.    Eyes: Negative for photophobia and redness.   Respiratory: Positive for cough. Negative for shortness of breath.    Cardiovascular: Negative for chest pain.   Gastrointestinal: Negative for abdominal pain, constipation, diarrhea, nausea and vomiting.   Genitourinary: Negative for dysuria.   Musculoskeletal: Negative for back pain.   Skin: Negative for rash.   Neurological: Negative for weakness and light-headedness.   Psychiatric/Behavioral: Negative for confusion.       Physical Exam     Initial Vitals [04/10/18 1854]   BP Pulse Resp Temp SpO2   125/60 81 18 98.3 °F (36.8 °C) 99 %      MAP       81.67         Physical Exam    Nursing note and vitals reviewed.  Constitutional: She appears well-developed and well-nourished. She is not diaphoretic. No distress.   HENT:   Head: Normocephalic and atraumatic.   Mouth/Throat: Oropharynx is clear and moist. No oropharyngeal exudate.   Oropharynx is clear and intact.  Moist mucous membranes.   Eyes: EOM are normal. Pupils are equal, round, and reactive to light. Right eye exhibits no discharge. Left eye exhibits no discharge.   Conjunctiva are pink, clear, and intact.   Neck: Normal range of motion. Neck supple.   Cardiovascular: Normal rate, regular rhythm and normal heart sounds. Exam reveals no gallop and no friction rub.    No murmur heard.  Normal S1, S2. No murmurs, no rubs, no gallops.    Pulmonary/Chest: Breath sounds normal. No respiratory distress. She has no wheezes. She has no rhonchi. She has no rales.   Clear to ascultation bilaterally.   Musculoskeletal: Normal range of motion. She  exhibits no edema or tenderness.   Neurological: She is alert and oriented to person, place, and time.   Skin: Skin is warm and dry. Capillary refill takes less than 2 seconds. No rash and no abscess noted. No erythema. No pallor.   Warm and dry. No skin tenting, rashes, or lesions.    Psychiatric: She has a normal mood and affect. Her behavior is normal. Judgment and thought content normal.         ED Course   Procedures  Labs Reviewed   POCT URINE PREGNANCY     Imaging Results          X-Ray Chest PA And Lateral (Final result)  Result time 04/10/18 20:37:09    Final result by Juan Cobb MD (04/10/18 20:37:09)                 Impression:      No acute cardiopulmonary process.      Electronically signed by: Juan Cobb MD  Date:    04/10/2018  Time:    20:37             Narrative:    EXAMINATION:  XR CHEST PA AND LATERAL    CLINICAL HISTORY:  Cough    TECHNIQUE:  PA and lateral views of the chest were performed.    COMPARISON:  None.    FINDINGS:  There is no consolidation, effusion, or pneumothorax.    Cardiomediastinal silhouette is unremarkable.    Regional osseous structures are unremarkable.                                     X-Rays:   Independently Interpreted Readings:   Chest X-Ray: No acute findings visualized.     Medical Decision Making:   Independently Interpreted Test(s):   I have ordered and independently interpreted X-rays - see prior notes.  Clinical Tests:   Lab Tests: Reviewed and Ordered  Radiological Study: Ordered and Reviewed            Scribe Attestation:   Scribe #1: I performed the above scribed service and the documentation accurately describes the services I performed. I attest to the accuracy of the note.    Attending Attestation:           Physician Attestation for Scribe:  Physician Attestation Statement for Scribe #1: I, Dr. Sidhu, reviewed documentation, as scribed by Laura Jefferson in my presence, and it is both accurate and complete.         Attending ED Notes:    Emergent evaluation of 23-year-old female with cough, cold, nasal congestion and runny nose.  Patient is afebrile, nontoxic-appearing with stable vital signs.  Lungs are clear to auscultation bilaterally.  Patient in no acute respiratory distress.  Oxygen saturation is on her percent on room air.  No acute findings on chest x-ray.  The patient is extensive the counseled on her diagnosis and treatment including all diagnostic, laboratory and physical exam findings.  The patient discharged good condition and directed follow-up with her PCP in the next 24-48 hours.             Clinical Impression:     1. Upper respiratory infection, acute    2. Cough                               Aime Kelley MD  04/15/18 5285

## 2018-05-15 ENCOUNTER — TELEPHONE (OUTPATIENT)
Dept: INTERNAL MEDICINE | Facility: CLINIC | Age: 23
End: 2018-05-15

## 2018-05-15 ENCOUNTER — OFFICE VISIT (OUTPATIENT)
Dept: INTERNAL MEDICINE | Facility: CLINIC | Age: 23
End: 2018-05-15
Payer: OTHER GOVERNMENT

## 2018-05-15 ENCOUNTER — LAB VISIT (OUTPATIENT)
Dept: LAB | Facility: HOSPITAL | Age: 23
End: 2018-05-15
Attending: INTERNAL MEDICINE
Payer: OTHER GOVERNMENT

## 2018-05-15 VITALS
TEMPERATURE: 98 F | WEIGHT: 154.31 LBS | BODY MASS INDEX: 30.29 KG/M2 | HEIGHT: 60 IN | DIASTOLIC BLOOD PRESSURE: 62 MMHG | HEART RATE: 68 BPM | RESPIRATION RATE: 14 BRPM | SYSTOLIC BLOOD PRESSURE: 114 MMHG

## 2018-05-15 DIAGNOSIS — R11.0 NAUSEA: ICD-10-CM

## 2018-05-15 DIAGNOSIS — K52.9 GASTROENTERITIS: Primary | ICD-10-CM

## 2018-05-15 DIAGNOSIS — K52.9 GASTROENTERITIS: ICD-10-CM

## 2018-05-15 DIAGNOSIS — R19.7 DIARRHEA OF PRESUMED INFECTIOUS ORIGIN: ICD-10-CM

## 2018-05-15 LAB
ALBUMIN SERPL BCP-MCNC: 3.5 G/DL
ALP SERPL-CCNC: 77 U/L
ALT SERPL W/O P-5'-P-CCNC: 23 U/L
ANION GAP SERPL CALC-SCNC: 10 MMOL/L
AST SERPL-CCNC: 30 U/L
BILIRUB SERPL-MCNC: 0.6 MG/DL
BUN SERPL-MCNC: 4 MG/DL
CALCIUM SERPL-MCNC: 9.3 MG/DL
CHLORIDE SERPL-SCNC: 105 MMOL/L
CO2 SERPL-SCNC: 26 MMOL/L
CREAT SERPL-MCNC: 0.7 MG/DL
EST. GFR  (AFRICAN AMERICAN): >60 ML/MIN/1.73 M^2
EST. GFR  (NON AFRICAN AMERICAN): >60 ML/MIN/1.73 M^2
GLUCOSE SERPL-MCNC: 72 MG/DL
LIPASE SERPL-CCNC: 3 U/L
POTASSIUM SERPL-SCNC: 3.5 MMOL/L
PROT SERPL-MCNC: 7.1 G/DL
SODIUM SERPL-SCNC: 141 MMOL/L

## 2018-05-15 PROCEDURE — 99213 OFFICE O/P EST LOW 20 MIN: CPT | Mod: PBBFAC,PO | Performed by: INTERNAL MEDICINE

## 2018-05-15 PROCEDURE — 80053 COMPREHEN METABOLIC PANEL: CPT

## 2018-05-15 PROCEDURE — 99999 PR PBB SHADOW E&M-EST. PATIENT-LVL III: CPT | Mod: PBBFAC,,, | Performed by: INTERNAL MEDICINE

## 2018-05-15 PROCEDURE — 36415 COLL VENOUS BLD VENIPUNCTURE: CPT | Mod: PO

## 2018-05-15 PROCEDURE — 99214 OFFICE O/P EST MOD 30 MIN: CPT | Mod: S$PBB,,, | Performed by: INTERNAL MEDICINE

## 2018-05-15 PROCEDURE — 83690 ASSAY OF LIPASE: CPT

## 2018-05-15 RX ORDER — ONDANSETRON 4 MG/1
4 TABLET, ORALLY DISINTEGRATING ORAL EVERY 6 HOURS PRN
Qty: 30 TABLET | Refills: 0 | Status: SHIPPED | OUTPATIENT
Start: 2018-05-15 | End: 2018-07-30

## 2018-05-15 NOTE — PROGRESS NOTES
"Subjective:       Patient ID: Suzanna Cai is a 23 y.o. female.    Chief Complaint: Abdominal Pain (virus, 2 weeks)    HPI    She reports abdominal cramping and diarrhea for the past two weeks. She is having loose to watery stools 5-10 times per day. She tried immodium on Saturday with some improvement. No blood in the stool. Sometimes nocturnal stools. Unknown sick contacts. She denies fevers. She is nauseated, denies emesis. No recent travel. No dysuria, hematuria.     LMP: currently       Review of Systems   Constitutional: Negative for chills and fever.   Eyes: Negative for redness.   Respiratory: Negative for chest tightness and shortness of breath.    Cardiovascular: Negative for chest pain and palpitations.   Gastrointestinal: Positive for abdominal pain, diarrhea and nausea. Negative for abdominal distention, blood in stool and vomiting.   Genitourinary: Negative for dysuria, flank pain and hematuria.   Musculoskeletal: Negative for arthralgias and myalgias.   Skin: Negative for rash.   Neurological: Negative for dizziness and light-headedness.   Hematological: Negative for adenopathy.       Objective:        Vitals:    05/15/18 1325   BP: 114/62   BP Location: Right arm   Patient Position: Sitting   BP Method: Medium (Manual)   Pulse: 68   Resp: 14   Temp: 98.4 °F (36.9 °C)   TempSrc: Oral   Weight: 70 kg (154 lb 5.2 oz)   Height: 4' 11.5" (1.511 m)       Body mass index is 30.65 kg/m².    Physical Exam   Constitutional: She is oriented to person, place, and time. She appears well-developed and well-nourished. No distress.   HENT:   Head: Normocephalic and atraumatic.   Nose: Nose normal.   Eyes: Conjunctivae and EOM are normal. Right eye exhibits no discharge. Left eye exhibits no discharge.   Neck: Normal range of motion. Neck supple.   Cardiovascular: Normal rate, regular rhythm, normal heart sounds and intact distal pulses.    Pulmonary/Chest: Effort normal and breath sounds normal.   Abdominal: " Soft. Bowel sounds are increased. There is generalized tenderness (mild).   Musculoskeletal: Normal range of motion. She exhibits no edema.   Neurological: She is alert and oriented to person, place, and time.   Skin: Skin is warm and dry. She is not diaphoretic. No erythema.   Psychiatric: She has a normal mood and affect. Her behavior is normal. Thought content normal.       Assessment:     1. Gastroenteritis    2. Diarrhea of presumed infectious origin    3. Nausea           Plan:         1. Gastroenteritis  - encouraged oral hydration; BRAT diet discussed  - Comprehensive metabolic panel; Future  - Lipase; Future    2. Diarrhea of presumed infectious origin  - hold off on further doses of immodium until C diff ruled out  - Clostridium difficile EIA; Future  - Stool culture; Future  - Comprehensive metabolic panel; Future  - Lipase; Future    3. Nausea  - ondansetron (ZOFRAN-ODT) 4 MG TbDL; Take 1 tablet (4 mg total) by mouth every 6 (six) hours as needed (nausea).  Dispense: 30 tablet; Refill: 0    Patient was instructed to notify clinic if symptoms change, worsen or do not improve.

## 2018-05-15 NOTE — PATIENT INSTRUCTIONS
"Start taking pro-biotics to restore normal gut bacteria    ___________          [place]hi Diet  Your healthcare provider may recommend a bland diet if you have an upset stomach. It consists of foods that are mild and easy to digest. It is better to eat small frequent meals rather than 3 large meals a day.    Beverages  OK: Fruit juices, non-caffeinated teas and coffee, non-carbonated khalil  Avoid: Carbonated beverage, caffeinated tea and coffee, all alcoholic beverages  Bread  OK: Refined white, wheat or rye bread, emani or soda crackers, Yancey toast, plain rolls, bagels  Avoid: Whole-grain bread  Cereal  OK: Refined cereals: cooked or ready to eat  Avoid: Whole-grain cereals and granola, or those containing bran, seeds or nuts  Desserts  OK: Peanut butter and all others except those to "avoid"  Avoid: Chocolate, cocoa, coconut, popcorn, nuts, seeds, jam, marmalade  Fruits  OK: Canned, cooked, frozen or fresh fruits without seeds or tough skin  Avoid: Olives, skin and seeds of fruit  Meats  OK: All fresh or preserved meat, fish and fowl  Avoid: Any that are prepared with those spices to "avoid"  Cheese and eggs  OK: Eggs, cottage cheese, cream cheese, other cheeses  Avoid: All cheeses made with those spices to "avoid"  Potatoes and pasta  OK: Potato, rice, macaroni, noodles, spaghetti  Avoid: None  Soups  OK: All soups without heavy seasoning  Avoid: Soups made with those spices to "avoid"  Vegetables  OK: Canned, cooked, fresh or frozen mildly flavored vegetables without seeds, skins or coarse fiber  Avoid: Vegetables prepared with those spices to "avoid"; skin and seeds of vegetables and those with coarse fiber  Spices  OK: Salt, lemon and lime juice, vinegar, all extracts, leatha, cinnamon, thyme, mace, allspice, paprika  Avoid: Chili powder, cloves, pepper, seed spices, garlic, gravy pickles, highly seasoned salad dressings  Date Last Reviewed: 11/20/2015  © 8396-9282 [employer]. [address], [place] [address]. All rights reserved. This information is not intended as a substitute for professional medical care. Always follow your healthcare professional's instructions.

## 2018-05-15 NOTE — TELEPHONE ENCOUNTER
----- Message from Timoteo Klein sent at 5/15/2018 11:38 AM CDT -----  Contact: Patient  Patient is in parking lot now had trouble finding location, I assisted her, appt is for 11:20 today.    Please call and advise  Thank you

## 2018-05-16 ENCOUNTER — TELEPHONE (OUTPATIENT)
Dept: INTERNAL MEDICINE | Facility: CLINIC | Age: 23
End: 2018-05-16

## 2018-05-16 ENCOUNTER — PATIENT MESSAGE (OUTPATIENT)
Dept: INTERNAL MEDICINE | Facility: CLINIC | Age: 23
End: 2018-05-16

## 2018-05-16 NOTE — TELEPHONE ENCOUNTER
----- Message from Timoteo Klein sent at 5/16/2018 12:40 PM CDT -----  Contact: Patient [phone]  Patient is requesting a call back from the Nurse stating she left the Tube tests in the car and melted with specimen inside and requesting another two Tubes one for Urine and other for Bowel, please requesting call to inform when can  Tubes.    Please call and advise  Thank you

## 2018-05-16 NOTE — TELEPHONE ENCOUNTER
Patient stated that she needs a new stool specimen sample. I informed the patient that she can pick it up today.

## 2018-06-06 ENCOUNTER — LAB VISIT (OUTPATIENT)
Dept: LAB | Facility: HOSPITAL | Age: 23
End: 2018-06-06
Attending: INTERNAL MEDICINE
Payer: OTHER GOVERNMENT

## 2018-06-06 DIAGNOSIS — R19.7 DIARRHEA OF PRESUMED INFECTIOUS ORIGIN: ICD-10-CM

## 2018-06-06 PROCEDURE — 87427 SHIGA-LIKE TOXIN AG IA: CPT | Mod: 59

## 2018-06-06 PROCEDURE — 87449 NOS EACH ORGANISM AG IA: CPT

## 2018-06-06 PROCEDURE — 87045 FECES CULTURE AEROBIC BACT: CPT

## 2018-06-06 PROCEDURE — 87046 STOOL CULTR AEROBIC BACT EA: CPT | Mod: 59

## 2018-06-07 ENCOUNTER — PATIENT MESSAGE (OUTPATIENT)
Dept: INTERNAL MEDICINE | Facility: CLINIC | Age: 23
End: 2018-06-07

## 2018-06-07 DIAGNOSIS — R19.7 DIARRHEA OF PRESUMED INFECTIOUS ORIGIN: ICD-10-CM

## 2018-06-07 DIAGNOSIS — R10.9 ABDOMINAL CRAMPING: ICD-10-CM

## 2018-06-07 LAB
C DIFF GDH STL QL: NEGATIVE
C DIFF TOX A+B STL QL IA: NEGATIVE

## 2018-06-07 RX ORDER — DICYCLOMINE HYDROCHLORIDE 10 MG/1
10 CAPSULE ORAL 3 TIMES DAILY PRN
Qty: 60 CAPSULE | Refills: 0 | Status: SHIPPED | OUTPATIENT
Start: 2018-06-07 | End: 2018-07-07

## 2018-06-08 ENCOUNTER — PATIENT MESSAGE (OUTPATIENT)
Dept: INTERNAL MEDICINE | Facility: CLINIC | Age: 23
End: 2018-06-08

## 2018-06-08 LAB
E COLI SXT1 STL QL IA: NEGATIVE
E COLI SXT2 STL QL IA: NEGATIVE

## 2018-06-11 ENCOUNTER — PATIENT MESSAGE (OUTPATIENT)
Dept: INTERNAL MEDICINE | Facility: CLINIC | Age: 23
End: 2018-06-11

## 2018-06-11 DIAGNOSIS — K59.1 FUNCTIONAL DIARRHEA: Primary | ICD-10-CM

## 2018-06-11 LAB — BACTERIA STL CULT: NORMAL

## 2018-06-11 RX ORDER — DIPHENOXYLATE HYDROCHLORIDE AND ATROPINE SULFATE 2.5; .025 MG/1; MG/1
1 TABLET ORAL 4 TIMES DAILY PRN
Qty: 40 TABLET | Refills: 0 | Status: SHIPPED | OUTPATIENT
Start: 2018-06-11 | End: 2018-06-21

## 2018-06-15 ENCOUNTER — TELEPHONE (OUTPATIENT)
Dept: INTERNAL MEDICINE | Facility: CLINIC | Age: 23
End: 2018-06-15

## 2018-06-15 NOTE — TELEPHONE ENCOUNTER
----- Message from Raissa Schwartz sent at 6/15/2018  1:23 PM CDT -----  Contact: self/[phone]  Patient called in regards needing to find out why her antidiarreah medicine has not been call to the pharmacy. [facility] [place] [id number] [address] [phone] (Phone) [phone] (Fax). Patient stated that she is leaving today out of town and the pharmacy close at 5 pm. Please call and adivse. Thank you

## 2018-07-28 ENCOUNTER — HOSPITAL ENCOUNTER (EMERGENCY)
Facility: HOSPITAL | Age: 23
Discharge: HOME | End: 2018-07-28
Attending: EMERGENCY MEDICINE
Payer: OTHER GOVERNMENT

## 2018-07-28 VITALS
DIASTOLIC BLOOD PRESSURE: 70 MMHG | HEART RATE: 85 BPM | HEIGHT: 60 IN | RESPIRATION RATE: 18 BRPM | WEIGHT: 150 LBS | TEMPERATURE: 99 F | OXYGEN SATURATION: 99 % | BODY MASS INDEX: 29.45 KG/M2 | SYSTOLIC BLOOD PRESSURE: 132 MMHG

## 2018-07-28 DIAGNOSIS — Z3A.01 LESS THAN 8 WEEKS GESTATION OF PREGNANCY: Primary | ICD-10-CM

## 2018-07-28 LAB
B-HCG UR QL: POSITIVE
CTP QC/QA: YES

## 2018-07-28 PROCEDURE — 81025 URINE PREGNANCY TEST: CPT | Performed by: NURSE PRACTITIONER

## 2018-07-28 PROCEDURE — 99284 EMERGENCY DEPT VISIT MOD MDM: CPT

## 2018-07-28 NOTE — ED PROVIDER NOTES
"Encounter Date: 7/28/2018        This is a SORT/MSE of a 23 y.o. female presenting to the ED with c/o positive pregnancy test today but pt states she has an implanted IUD Janis. Reports sore breasts. She denies vaginal pain, bleeding, abd pain.     I have evaluated and conducted a medical screening exam with initial orders entered, if indicated, to expedite care. The patient will be placed in a treatment area when one is available. Care will be transferred to an alternate provider for a full assessment including but not limited to: history, physical exam, additional orders, and final disposition. KEVEN Flores, WADE-ESTELLA 7/28/2018 5:03 PM    History     Chief Complaint   Patient presents with    Amenorrhea     Pt states "I have the IUD placed and my period is 4 days late. I took a pregnancy test and it was positive"     23-year-old female with no past medical history presents to the ED for a pregnancy test.  Patient currently has an IUD in place.  Patient has had IUD in a year.  Patient had a positive home pregnancy test.  Patient missed her period this month cycle. LMP 6/24/18. Patient denies any abdominal pain or pelvic discomfort. Patient denies any pain at this time.  Patient denies any fever, chills, sore throat, dysuria, above the loss of bladder or bowel, vaginal bleeding or discharge, back pain, or dizziness.      The history is provided by the patient.     Review of patient's allergies indicates:  No Known Allergies  Past Medical History:   Diagnosis Date    Allergy     Blood clotting tendency     Frequent headaches 2013    has MRI 2013 and neg per old records    History of concussion     Scoliosis     Urticaria      Past Surgical History:   Procedure Laterality Date    TONSILLECTOMY       Family History   Problem Relation Age of Onset    Ovarian cancer Maternal Grandmother 66    Hypertension Maternal Grandmother     Diabetes Maternal Grandmother     No Known Problems Mother     Hypertension " Father     Eczema Sister     Ulcers Sister     Scoliosis Sister     Psoriasis Sister     No Known Problems Brother     No Known Problems Maternal Aunt     No Known Problems Maternal Uncle     No Known Problems Paternal Aunt     No Known Problems Paternal Uncle     Hypertension Maternal Grandfather     Hypertension Paternal Grandmother     No Known Problems Paternal Grandfather     Breast cancer Cousin 34    Colon cancer Neg Hx     Melanoma Neg Hx     Lupus Neg Hx     Amblyopia Neg Hx     Blindness Neg Hx     Cancer Neg Hx     Cataracts Neg Hx     Glaucoma Neg Hx     Macular degeneration Neg Hx     Retinal detachment Neg Hx     Strabismus Neg Hx     Stroke Neg Hx     Thyroid disease Neg Hx      Social History   Substance Use Topics    Smoking status: Never Smoker    Smokeless tobacco: Never Used    Alcohol use Yes      Comment: socially     Review of Systems   Constitutional: Negative for chills, diaphoresis, fatigue and fever.   HENT: Negative.  Negative for sore throat.    Eyes: Negative for pain.   Respiratory: Negative for chest tightness and shortness of breath.    Cardiovascular: Negative for chest pain.   Gastrointestinal: Negative for abdominal distention, abdominal pain, constipation, diarrhea, nausea and vomiting.   Genitourinary: Negative for dysuria, flank pain, hematuria, pelvic pain, urgency, vaginal bleeding, vaginal discharge and vaginal pain.   Musculoskeletal: Negative for back pain, joint swelling and neck pain.   Skin: Negative for rash.   Neurological: Negative for dizziness, weakness, light-headedness and headaches.   Hematological: Does not bruise/bleed easily.   Psychiatric/Behavioral: Negative for agitation, behavioral problems, confusion and decreased concentration.       Physical Exam     Initial Vitals [07/28/18 1702]   BP Pulse Resp Temp SpO2   138/69 99 16 99 °F (37.2 °C) 99 %      MAP       --         Physical Exam    Nursing note and vitals  reviewed.  Constitutional: Vital signs are normal. She appears well-developed and well-nourished.  Non-toxic appearance.   HENT:   Head: Normocephalic and atraumatic.   Right Ear: Hearing and external ear normal.   Left Ear: Hearing and external ear normal.   Nose: Nose normal.   Mouth/Throat: Uvula is midline, oropharynx is clear and moist and mucous membranes are normal.   Eyes: EOM are normal. Pupils are equal, round, and reactive to light.   Neck: Neck supple. No JVD present.   Cardiovascular: Normal rate, regular rhythm, normal heart sounds and intact distal pulses. Exam reveals no decreased pulses.    Pulmonary/Chest: Effort normal and breath sounds normal.   Abdominal: Soft. Normal appearance and bowel sounds are normal. There is no tenderness. There is no rigidity, no rebound, no guarding, no CVA tenderness, no tenderness at McBurney's point and negative Bowers's sign. No hernia.   Musculoskeletal: Normal range of motion.   Neurological: She is alert and oriented to person, place, and time. She has normal strength. Gait normal.   Skin: Skin is warm. Capillary refill takes less than 2 seconds. No rash noted.   Psychiatric: She has a normal mood and affect. Her behavior is normal. Judgment and thought content normal.         ED Course   Procedures  Labs Reviewed   POCT URINE PREGNANCY - Abnormal; Notable for the following:        Result Value    POC Preg Test, Ur Positive (*)     All other components within normal limits   HCG, QUANTITATIVE, PREGNANCY          Imaging Results    None          Medical Decision Making:   Initial Assessment:   This is an evaluation of a 23 y.o. female that presents to the Emergency Department with Positive Home Pregnancy Test.  Patient states she wanted to be re-evaluated to confirm pregnancy. Patient denies any vaginal discharge or bleeding, dysuria.  Physical Exam shows a non-toxic, afebrile, and well appearing female.  Patient abdomen soft and nontender. Denies any suprapubic  tenderness. Bowel sounds active in all quadrants. -CVA tenderness. Breath sounds clear in all lung fields. Cardiac: RRR.     Vital Signs Are Reassuring.  No imaging indicated at this time.      UPT: Positive      Differential Diagnosis:   Presentation most consistent with Less than 8 weeks gestation of pregnancy- LMP 6/24/18. Given the very benign exam, and lack of significant risk factors, I considered, but at this time, do not suspect an ectopic pregnancy, appendicitis, ischemic bowel, UTI, pyelonephritis, kidney stone, diverticulitis, or cholecystitis.     ED Management:  ED Course:  Patient denies any pain during ED stay.  D/C Meds: None.  Additional D/C Information:  Follow up with OBGYN ASAP. The diagnosis, treatment plan, instructions for follow-up and reevaluation with her PCP as well as ED return precautions were discussed and understanding was verbalized. All questions or concerns have been addressed.     This case was discussed with  who is in agreement with my assessment and plan.         Lida Pal NP                           Clinical Impression:   The encounter diagnosis was Less than 8 weeks gestation of pregnancy.      Disposition:   Disposition: Discharged  Condition: Stable                        Lida Pal NP  07/28/18 1948

## 2018-07-28 NOTE — DISCHARGE INSTRUCTIONS
SCHEDULE AN APPOINTMENT WITH OBGYN ASAP    RETURN TO THE EMERGENCY ROOM IF SYMPTOMS WORSEN OR ANY OTHER CONCERNS.

## 2018-07-28 NOTE — ED TRIAGE NOTES
Patient presents to the ED via personal vehicle with family. Patient reports breast tenderness, low back pain x 1 week. Patient has positive at home pregnancy test but has an IUD placed. Patient denies nausea,vomiting, pelvic pain, vaginal bleeding.

## 2018-07-29 ENCOUNTER — PATIENT MESSAGE (OUTPATIENT)
Dept: OBSTETRICS AND GYNECOLOGY | Facility: CLINIC | Age: 23
End: 2018-07-29

## 2018-07-30 ENCOUNTER — TELEPHONE (OUTPATIENT)
Dept: OBSTETRICS AND GYNECOLOGY | Facility: CLINIC | Age: 23
End: 2018-07-30

## 2018-07-30 ENCOUNTER — LAB VISIT (OUTPATIENT)
Dept: LAB | Facility: OTHER | Age: 23
End: 2018-07-30
Attending: OBSTETRICS & GYNECOLOGY
Payer: OTHER GOVERNMENT

## 2018-07-30 ENCOUNTER — INITIAL PRENATAL (OUTPATIENT)
Dept: OBSTETRICS AND GYNECOLOGY | Facility: CLINIC | Age: 23
End: 2018-07-30
Attending: OBSTETRICS & GYNECOLOGY
Payer: OTHER GOVERNMENT

## 2018-07-30 VITALS
WEIGHT: 142.63 LBS | SYSTOLIC BLOOD PRESSURE: 112 MMHG | DIASTOLIC BLOOD PRESSURE: 64 MMHG | BODY MASS INDEX: 28.33 KG/M2

## 2018-07-30 DIAGNOSIS — Z34.01 ENCOUNTER FOR SUPERVISION OF NORMAL FIRST PREGNANCY IN FIRST TRIMESTER: ICD-10-CM

## 2018-07-30 DIAGNOSIS — T83.31XA INTRAUTERINE DEVICE (IUD) CONTRACEPTIVE FAILURE RESULTING IN PREGNANCY: ICD-10-CM

## 2018-07-30 DIAGNOSIS — Z33.1 INTRAUTERINE DEVICE (IUD) CONTRACEPTIVE FAILURE RESULTING IN PREGNANCY: ICD-10-CM

## 2018-07-30 PROBLEM — Z34.00 PREGNANCY, SUPERVISION, NORMAL, FIRST: Status: ACTIVE | Noted: 2018-07-30

## 2018-07-30 PROBLEM — R10.9 ABDOMINAL CRAMPING: Status: RESOLVED | Noted: 2018-06-07 | Resolved: 2018-07-30

## 2018-07-30 LAB
ABO + RH BLD: NORMAL
BASOPHILS # BLD AUTO: 0.01 K/UL
BASOPHILS NFR BLD: 0.1 %
BLD GP AB SCN CELLS X3 SERPL QL: NORMAL
DIFFERENTIAL METHOD: ABNORMAL
EOSINOPHIL # BLD AUTO: 0 K/UL
EOSINOPHIL NFR BLD: 0.4 %
ERYTHROCYTE [DISTWIDTH] IN BLOOD BY AUTOMATED COUNT: 13.7 %
HCT VFR BLD AUTO: 36.3 %
HGB BLD-MCNC: 12.4 G/DL
LYMPHOCYTES # BLD AUTO: 2.3 K/UL
LYMPHOCYTES NFR BLD: 25.4 %
MCH RBC QN AUTO: 30.5 PG
MCHC RBC AUTO-ENTMCNC: 34.2 G/DL
MCV RBC AUTO: 89 FL
MONOCYTES # BLD AUTO: 0.5 K/UL
MONOCYTES NFR BLD: 5 %
NEUTROPHILS # BLD AUTO: 6.3 K/UL
NEUTROPHILS NFR BLD: 68.8 %
PLATELET # BLD AUTO: 336 K/UL
PMV BLD AUTO: 10.3 FL
RBC # BLD AUTO: 4.06 M/UL
WBC # BLD AUTO: 9.21 K/UL

## 2018-07-30 PROCEDURE — 86703 HIV-1/HIV-2 1 RESULT ANTBDY: CPT

## 2018-07-30 PROCEDURE — 85025 COMPLETE CBC W/AUTO DIFF WBC: CPT

## 2018-07-30 PROCEDURE — 36415 COLL VENOUS BLD VENIPUNCTURE: CPT

## 2018-07-30 PROCEDURE — 87340 HEPATITIS B SURFACE AG IA: CPT

## 2018-07-30 PROCEDURE — 86592 SYPHILIS TEST NON-TREP QUAL: CPT

## 2018-07-30 PROCEDURE — 87491 CHLMYD TRACH DNA AMP PROBE: CPT

## 2018-07-30 PROCEDURE — 0502F SUBSEQUENT PRENATAL CARE: CPT | Mod: ,,, | Performed by: OBSTETRICS & GYNECOLOGY

## 2018-07-30 PROCEDURE — 99213 OFFICE O/P EST LOW 20 MIN: CPT | Mod: PBBFAC,25 | Performed by: OBSTETRICS & GYNECOLOGY

## 2018-07-30 PROCEDURE — 83021 HEMOGLOBIN CHROMOTOGRAPHY: CPT

## 2018-07-30 PROCEDURE — 99999 PR PBB SHADOW E&M-EST. PATIENT-LVL III: CPT | Mod: PBBFAC,,, | Performed by: OBSTETRICS & GYNECOLOGY

## 2018-07-30 PROCEDURE — 86850 RBC ANTIBODY SCREEN: CPT

## 2018-07-30 PROCEDURE — 87086 URINE CULTURE/COLONY COUNT: CPT

## 2018-07-30 PROCEDURE — 86762 RUBELLA ANTIBODY: CPT

## 2018-07-30 NOTE — TELEPHONE ENCOUNTER
----- Message from Sho Yu sent at 7/30/2018  8:07 AM CDT -----  Contact: self  Pt went to ER over the weekend and was told she was pregnant, pt has a IUD, she wants it out today!  She can be reached at [phone].

## 2018-07-30 NOTE — TELEPHONE ENCOUNTER
Spoke with Pt    Pt went to ER because she had a positive home pregnacy test and pt has a bibi IUD.    Pregnancy test confirmed at ED.    Pt scheduled with Dr Handley today as Dr Streeter is out of the office.    Pt verbalized understanding of time, date, and location of appt.

## 2018-07-30 NOTE — TELEPHONE ENCOUNTER
----- Message from Sho Yu sent at 7/30/2018  3:32 PM CDT -----  Contact: self  Pt needing a call back, she have questions and concerns, she can be reached at [phone]. Pt states she just left the office.

## 2018-07-30 NOTE — PROGRESS NOTES
She had a positive pregnancy test in the ED.  IUD in place.  IUD removed today.  Counseled to avoid cat litter, not garden without gloves, avoid raw meat, heat up deli meat, to eat large fish like tuna no more than once a week, and to avoid soft unpasteurized cheeses.  I recommend a PNV daily.  She should avoid ibuprofen.  Suzanna was seen today for initial prenatal visit.    Diagnoses and all orders for this visit:    Encounter for supervision of normal first pregnancy in first trimester  -     HIV-1 and HIV-2 antibodies; Future  -     RPR; Future  -     Hemoglobin Electrophoresis,Hgb A2 Marquise.; Future  -     Hepatitis B surface antigen; Future  -     Type & Screen - Ob Profile; Future  -     Rubella antibody, IgG; Future  -     Urine culture  -     C. trachomatis/N. gonorrhoeae by AMP DNA  -     CBC auto differential; Future  -     US OB/GYN Procedure ([facility]); Future    Intrauterine device (IUD) contraceptive failure resulting in pregnancy

## 2018-07-31 LAB
C TRACH DNA SPEC QL NAA+PROBE: NOT DETECTED
HBV SURFACE AG SERPL QL IA: NEGATIVE
HIV 1+2 AB+HIV1 P24 AG SERPL QL IA: NEGATIVE
N GONORRHOEA DNA SPEC QL NAA+PROBE: NOT DETECTED
RPR SER QL: NORMAL
RUBV IGG SER-ACNC: 59.4 IU/ML
RUBV IGG SER-IMP: REACTIVE

## 2018-08-01 LAB
BACTERIA UR CULT: NORMAL
BACTERIA UR CULT: NORMAL
HGB A2 MFR BLD HPLC: 2.5 %
HGB FRACT BLD ELPH-IMP: NORMAL
HGB FRACT BLD ELPH-IMP: NORMAL

## 2018-08-09 ENCOUNTER — PROCEDURE VISIT (OUTPATIENT)
Dept: OBSTETRICS AND GYNECOLOGY | Facility: CLINIC | Age: 23
End: 2018-08-09
Attending: OBSTETRICS & GYNECOLOGY

## 2018-08-09 DIAGNOSIS — Z36.89 ENCOUNTER TO ESTABLISH GESTATIONAL AGE USING ULTRASOUND: ICD-10-CM

## 2018-08-09 DIAGNOSIS — Z34.01 ENCOUNTER FOR SUPERVISION OF NORMAL FIRST PREGNANCY IN FIRST TRIMESTER: ICD-10-CM

## 2018-08-09 PROCEDURE — 76801 OB US < 14 WKS SINGLE FETUS: CPT | Mod: PBBFAC | Performed by: OBSTETRICS & GYNECOLOGY

## 2018-08-09 PROCEDURE — 76801 OB US < 14 WKS SINGLE FETUS: CPT | Mod: 26,S$PBB,, | Performed by: OBSTETRICS & GYNECOLOGY

## 2018-08-09 NOTE — PROCEDURES
Procedures     Obstetrical ultrasound completed today.  See report in imaging section of [facility].

## 2018-08-14 ENCOUNTER — TELEPHONE (OUTPATIENT)
Dept: OBSTETRICS AND GYNECOLOGY | Facility: CLINIC | Age: 23
End: 2018-08-14

## 2018-08-14 NOTE — TELEPHONE ENCOUNTER
----- Message from Glenis Kirby sent at 8/14/2018 12:57 PM CDT -----  Contact: ELIZ ALBA [[id number]]            Name of Who is Calling: ELIZ ALBA [[id number]]    What is the request in detail: Pt is calling to find out if she can eat deli meat warmed up.  Pt is also wanting to know if she can do another ultrasound at the time of her next appointment.  Please contact pt to further discuss and advise.    Can the clinic reply by [facility]: No       What Number to Call Back if not in [facility]: [phone]

## 2018-08-14 NOTE — TELEPHONE ENCOUNTER
Returned pt's phone call. Pt wanted to make sure it was ok to eat deli meat if it was warmed up. Advised pt per Dr Streeter that as long as she warmed the deli meat up for about 45 seconds in the microwave she could eat it. Pt also stated that she was wondering about hearing the babies heartbeat because at her ultrasound last week the tech informed her she would not have another ultrasound until 19 weeks. Advised pt that at every routine prenatal visit we would listen to the babies heartbeat with the doppler and her anatomy ultrasound would be around 19 weeks. All of the pt's questions were answered and pt verbalized understanding.

## 2018-08-15 ENCOUNTER — TELEPHONE (OUTPATIENT)
Dept: OBSTETRICS AND GYNECOLOGY | Facility: CLINIC | Age: 23
End: 2018-08-15

## 2018-08-15 NOTE — TELEPHONE ENCOUNTER
----- Message from Sho Yu sent at 8/15/2018  8:02 AM CDT -----  Contact: self  OB pt 10 weeks, pt have questions regarding cramping and needing a call back, she can be reached at [phone].

## 2018-08-15 NOTE — TELEPHONE ENCOUNTER
Patient 10 weeks 2 days and reports abdominal cramping last night after drink almond milk with cereal,patient states that pain has since resolved,patient was instructed to stay hydrated with plenty of fluids and be mindful of certain foods she intake may not agree with her body at this time,patient denies any bleeding,leakage of fluid or discharge,patient was given strict ER precautions and verbalized understanding.

## 2018-08-17 ENCOUNTER — TELEPHONE (OUTPATIENT)
Dept: OBSTETRICS AND GYNECOLOGY | Facility: CLINIC | Age: 23
End: 2018-08-17

## 2018-08-17 NOTE — TELEPHONE ENCOUNTER
----- Message from Silvia Storey sent at 8/17/2018 10:28 AM CDT -----  Contact: pt            Name of Who is Calling: pt      What is the request in detail: pt needs to know if she can drink a smoothie while pregnant. Call pt      Can the clinic reply by [facility]: no      [facility]: [phone]

## 2018-08-17 NOTE — TELEPHONE ENCOUNTER
Patient brought a fruit smoothie and wanted to make sure its safe is pregnancy, I discussed all foods to avoid in pregnancy,patient verbalized understanding.

## 2018-08-28 ENCOUNTER — ROUTINE PRENATAL (OUTPATIENT)
Dept: OBSTETRICS AND GYNECOLOGY | Facility: CLINIC | Age: 23
End: 2018-08-28
Payer: OTHER GOVERNMENT

## 2018-08-28 VITALS
DIASTOLIC BLOOD PRESSURE: 62 MMHG | BODY MASS INDEX: 28.06 KG/M2 | WEIGHT: 141.31 LBS | SYSTOLIC BLOOD PRESSURE: 108 MMHG

## 2018-08-28 DIAGNOSIS — Z34.01 ENCOUNTER FOR SUPERVISION OF NORMAL FIRST PREGNANCY IN FIRST TRIMESTER: Primary | ICD-10-CM

## 2018-08-28 DIAGNOSIS — Z3A.12 12 WEEKS GESTATION OF PREGNANCY: ICD-10-CM

## 2018-08-28 PROCEDURE — 0502F SUBSEQUENT PRENATAL CARE: CPT | Mod: ,,, | Performed by: OBSTETRICS & GYNECOLOGY

## 2018-08-28 PROCEDURE — 99212 OFFICE O/P EST SF 10 MIN: CPT | Mod: PBBFAC | Performed by: OBSTETRICS & GYNECOLOGY

## 2018-08-28 PROCEDURE — 99999 PR PBB SHADOW E&M-EST. PATIENT-LVL II: CPT | Mod: PBBFAC,,, | Performed by: OBSTETRICS & GYNECOLOGY

## 2018-08-28 RX ORDER — HYDROQUINONE 40 MG/G
CREAM TOPICAL
COMMUNITY
Start: 2018-08-21 | End: 2018-08-28

## 2018-09-04 ENCOUNTER — LAB VISIT (OUTPATIENT)
Dept: LAB | Facility: OTHER | Age: 23
End: 2018-09-04
Attending: OBSTETRICS & GYNECOLOGY
Payer: OTHER GOVERNMENT

## 2018-09-04 ENCOUNTER — PROCEDURE VISIT (OUTPATIENT)
Dept: MATERNAL FETAL MEDICINE | Facility: CLINIC | Age: 23
End: 2018-09-04
Payer: OTHER GOVERNMENT

## 2018-09-04 DIAGNOSIS — Z34.01 ENCOUNTER FOR SUPERVISION OF NORMAL FIRST PREGNANCY IN FIRST TRIMESTER: ICD-10-CM

## 2018-09-04 DIAGNOSIS — Z36.82 ENCOUNTER FOR NUCHAL TRANSLUCENCY TESTING: Primary | ICD-10-CM

## 2018-09-04 DIAGNOSIS — Z36.82 ENCOUNTER FOR NUCHAL TRANSLUCENCY TESTING: ICD-10-CM

## 2018-09-04 DIAGNOSIS — Z3A.12 12 WEEKS GESTATION OF PREGNANCY: ICD-10-CM

## 2018-09-04 DIAGNOSIS — Z36.89 ENCOUNTER FOR FETAL ANATOMIC SURVEY: ICD-10-CM

## 2018-09-04 PROCEDURE — 76813 OB US NUCHAL MEAS 1 GEST: CPT | Mod: 26,S$PBB,, | Performed by: OBSTETRICS & GYNECOLOGY

## 2018-09-04 PROCEDURE — 84163 PAPPA SERUM: CPT

## 2018-09-04 PROCEDURE — 76813 OB US NUCHAL MEAS 1 GEST: CPT | Mod: PBBFAC | Performed by: OBSTETRICS & GYNECOLOGY

## 2018-09-04 PROCEDURE — 36415 COLL VENOUS BLD VENIPUNCTURE: CPT

## 2018-09-07 ENCOUNTER — TELEPHONE (OUTPATIENT)
Dept: OBSTETRICS AND GYNECOLOGY | Facility: CLINIC | Age: 23
End: 2018-09-07

## 2018-09-07 LAB — MATERNAL INTEGRATED SCREEN PART 1: NORMAL

## 2018-09-07 NOTE — TELEPHONE ENCOUNTER
Spoke with pt:  Pt is having a headache and wants to know if she can take extra strength tylenol.    Pt has not eaten since this morning.  Pt denies blurry vision at this time.      Pt instructed to take regular strength tylenol, drink plenty of fluids, and eat a good meal - high in protein.    Pt told if headache does not go away and she starts to experience blurry vision to call office for further instructions.    Pt verblized understanding.

## 2018-09-07 NOTE — TELEPHONE ENCOUNTER
----- Message from Annabel Hernandez sent at 9/7/2018  3:55 PM CDT -----  Contact: Pt  Name of Who is Calling: ELIZ ALBA [[id number]]      What is the request in detail: Patient 13 weeks OB states she would like a call back to be informed if she can take extra strength tylenol for a headache......Please contact to further discuss and advise       Can the clinic reply by [facility]: No      [facility]: [phone]

## 2018-09-14 ENCOUNTER — TELEPHONE (OUTPATIENT)
Dept: OBSTETRICS AND GYNECOLOGY | Facility: CLINIC | Age: 23
End: 2018-09-14

## 2018-09-14 NOTE — TELEPHONE ENCOUNTER
----- Message from Alissa Moreira sent at 9/14/2018  2:16 PM CDT -----            Name of Who is Calling: ELIZ ALBA [[id number]]    What is the request in detail: Pt states she's is 14 weeks and 4 days and she wants to get clarity on whether or not she can have robitussin with or without dm. She also wants to know if she can take coldease. Please call to discuss.      Can the clinic reply by [facility]: yes      What Number to Call Back if not in [facility]: [phone]

## 2018-09-25 ENCOUNTER — LAB VISIT (OUTPATIENT)
Dept: LAB | Facility: OTHER | Age: 23
End: 2018-09-25
Attending: OBSTETRICS & GYNECOLOGY
Payer: OTHER GOVERNMENT

## 2018-09-25 ENCOUNTER — ROUTINE PRENATAL (OUTPATIENT)
Dept: OBSTETRICS AND GYNECOLOGY | Facility: CLINIC | Age: 23
End: 2018-09-25
Payer: OTHER GOVERNMENT

## 2018-09-25 VITALS
BODY MASS INDEX: 28.68 KG/M2 | SYSTOLIC BLOOD PRESSURE: 122 MMHG | DIASTOLIC BLOOD PRESSURE: 70 MMHG | WEIGHT: 144.38 LBS

## 2018-09-25 DIAGNOSIS — Z3A.16 16 WEEKS GESTATION OF PREGNANCY: Primary | ICD-10-CM

## 2018-09-25 DIAGNOSIS — Z3A.16 16 WEEKS GESTATION OF PREGNANCY: ICD-10-CM

## 2018-09-25 PROCEDURE — 99999 PR PBB SHADOW E&M-EST. PATIENT-LVL II: CPT | Mod: PBBFAC,,, | Performed by: OBSTETRICS & GYNECOLOGY

## 2018-09-25 PROCEDURE — 0502F SUBSEQUENT PRENATAL CARE: CPT | Mod: ,,, | Performed by: OBSTETRICS & GYNECOLOGY

## 2018-09-25 PROCEDURE — 36415 COLL VENOUS BLD VENIPUNCTURE: CPT

## 2018-09-25 PROCEDURE — 99212 OFFICE O/P EST SF 10 MIN: CPT | Mod: PBBFAC | Performed by: OBSTETRICS & GYNECOLOGY

## 2018-09-25 NOTE — PROGRESS NOTES
Good fm.  Denies ctx, vb, lof.  Has anatomy scan scheduled in 2 weeks.  Moving back to [place].  PTL prec given.

## 2018-09-27 LAB
# FETUSES US: NORMAL
AFP MOM SERPL: 1.39
AFP SERPL-MCNC: 53.5 NG/ML
AGE AT DELIVERY: 23
B-HCG MOM SERPL: 0.67
B-HCG SERPL-ACNC: 28.3 IU/ML
COLLECT DATE BLD: NORMAL
COLLECT DATE: NORMAL
FET TS 21 RISK FROM MAT AGE: NORMAL
GA (DAYS): 1 D
GA (WEEKS): 13 WK
GA METHOD: NORMAL
GEST. AGE (DAYS) 2ND SAMPLE (SI2): 1
GEST. AGE (WKS) 2ND SAMPLE (SI2): 16
IDDM PATIENT QL: NORMAL
INHIBIN A MOM SERPL: 0.86
INHIBIN A SERPL-MCNC: 151.3 PG/ML
INTEGRATED SCN PATIENT-IMP: NEGATIVE
PAPP-A MOM SERPL: 0.96
PAPP-A SERPL-MCNC: NORMAL NG/ML
SERUM INTEGRATED SCREEN 2 INTERP: NORMAL
SMOKING STATUS FTND: NO
TS 18 RISK FETUS: NORMAL
TS 21 RISK FETUS: NORMAL
U ESTRIOL MOM SERPL: 0.8
U ESTRIOL SERPL-MCNC: 0.65 NG/ML

## 2018-10-10 ENCOUNTER — TELEPHONE (OUTPATIENT)
Dept: OBSTETRICS AND GYNECOLOGY | Facility: CLINIC | Age: 23
End: 2018-10-10

## 2018-10-10 NOTE — TELEPHONE ENCOUNTER
Spoke with pt:  Pt requesting [facility] address for Medical Release form.    Pt given informant.    Pt verbalized understanding.

## 2018-10-10 NOTE — TELEPHONE ENCOUNTER
----- Message from Mey Soto sent at 10/10/2018 10:45 AM CDT -----  Contact: Pt  Name of Who is Calling: ELIZ ABLA [[id number]]    What is the request in detail: Pt states that she would be [place] to another [place] and would need her records sent to her new provider.     Fax # [phone]  Attn: Dolores Daniels     Can the clinic reply by [facility]: No     What Number to Call Back if not in [facility]: [phone]

## 2018-10-10 NOTE — TELEPHONE ENCOUNTER
Spoke with Pt  Pt was instructed to have her new Ob/GYN fax a medical release request to [facility] at [phone].    Pt was given the medical records protocol and was informed that with out the signed release form, her medical records would not be able to be sent to her New provider.    Pt verbalized understanding.

## 2018-10-10 NOTE — TELEPHONE ENCOUNTER
----- Message from La Kelly sent at 10/10/2018 12:01 PM CDT -----  Contact: pt   Name of Who is Calling: EILZ ALBA [[id number]]    What is the request in detail: patient is requesting tp speak with staff in regards to releasing her medical records......Please contact to further discuss and advise      Can the clinic reply by [facility]: No     What Number to Call Back if not in [facility]:  [phone]

## 2018-10-12 ENCOUNTER — TELEPHONE (OUTPATIENT)
Dept: OBSTETRICS AND GYNECOLOGY | Facility: CLINIC | Age: 23
End: 2018-10-12

## 2018-10-12 NOTE — TELEPHONE ENCOUNTER
----- Message from Augustus Randolph sent at 10/12/2018  4:00 PM CDT -----  Pt returning your call [phone]

## 2018-10-12 NOTE — TELEPHONE ENCOUNTER
LVM:  Good afternoon Ms Cai, this is Eulalia from Dr Streeter's office returning your call.   Please call our office back at [phone].    Thank You

## 2018-10-12 NOTE — TELEPHONE ENCOUNTER
----- Message from Raissa Rachell sent at 10/12/2018  3:30 PM CDT -----  Contact: ELIZ ALBA [[id number]]            Name of Who is Calling: ELIZ ALBA [[id number]]     What is the request in detail: Patient need for the nurse to call her regarding getting her records transferred. Please call her.     Can the clinic reply by [facility]: no      [facility][phone]

## 2018-10-12 NOTE — TELEPHONE ENCOUNTER
Spoke with pt:  Pt was given medical records telephone number  [phone]    Pt verbalized understanding.

## 2018-10-12 NOTE — TELEPHONE ENCOUNTER
----- Message from Meagan Anderson MD sent at 1/9/2017  8:58 AM CST -----  Contact: Shari with Dr. Galo's office  Please schedule the patient for 1 hour new eval on Monday 1/16 at 8:30AM.  Thank you.   ----- Message -----     From: Antwan Farley     Sent: 1/6/2017   1:18 PM       To: Meagan Anderson MD    Patient referred by Dr. Galo for concussion/headache evaluation. Next available 2/2/2017.  Please advise.  ----- Message -----     From: Rafia Crawford     Sent: 1/6/2017  12:26 PM       To: [facility] Staff    x  1st Request  _  2nd Request  _  3rd Request        Who: Shari with Dr. Galo's office    Why: Shari states pt has headaches due to concussion and Dr. Galo has referred the pt to be seen as soon as possible. An attempt was made but first available is 02/02. Please call pt, thanks!    What Number to Call Back: [phone]    When to Expect a call back: (Before the end of the day)   -- if call after 3:00 call back will be tomorrow.                             

## 2018-10-15 ENCOUNTER — TELEPHONE (OUTPATIENT)
Dept: OBSTETRICS AND GYNECOLOGY | Facility: CLINIC | Age: 23
End: 2018-10-15

## 2018-10-15 ENCOUNTER — OFFICE VISIT (OUTPATIENT)
Dept: OBSTETRICS AND GYNECOLOGY | Facility: CLINIC | Age: 23
End: 2018-10-15
Payer: OTHER GOVERNMENT

## 2018-10-15 VITALS
HEIGHT: 59 IN | WEIGHT: 146.94 LBS | BODY MASS INDEX: 29.62 KG/M2 | SYSTOLIC BLOOD PRESSURE: 110 MMHG | DIASTOLIC BLOOD PRESSURE: 80 MMHG

## 2018-10-15 DIAGNOSIS — T74.91XA DOMESTIC VIOLENCE OF ADULT, INITIAL ENCOUNTER: Primary | ICD-10-CM

## 2018-10-15 DIAGNOSIS — Z34.00 SUPERVISION OF NORMAL FIRST PREGNANCY, ANTEPARTUM: ICD-10-CM

## 2018-10-15 PROCEDURE — 99999 PR PBB SHADOW E&M-EST. PATIENT-LVL III: CPT | Mod: PBBFAC,,, | Performed by: NURSE PRACTITIONER

## 2018-10-15 PROCEDURE — 99213 OFFICE O/P EST LOW 20 MIN: CPT | Mod: PBBFAC | Performed by: NURSE PRACTITIONER

## 2018-10-15 PROCEDURE — 99213 OFFICE O/P EST LOW 20 MIN: CPT | Mod: S$PBB,,, | Performed by: NURSE PRACTITIONER
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
## 2018-10-17 ENCOUNTER — PROCEDURE VISIT (OUTPATIENT)
Dept: MATERNAL FETAL MEDICINE | Facility: CLINIC | Age: 23
End: 2018-10-17
Payer: OTHER GOVERNMENT

## 2018-10-17 DIAGNOSIS — Z36.89 ENCOUNTER FOR FETAL ANATOMIC SURVEY: ICD-10-CM

## 2018-10-17 PROCEDURE — 76805 OB US >/= 14 WKS SNGL FETUS: CPT | Mod: PBBFAC | Performed by: OBSTETRICS & GYNECOLOGY

## 2018-10-17 PROCEDURE — 76805 OB US >/= 14 WKS SNGL FETUS: CPT | Mod: 26,S$PBB,, | Performed by: OBSTETRICS & GYNECOLOGY

## 2018-10-17 PROCEDURE — 99499 UNLISTED E&M SERVICE: CPT | Mod: S$PBB,,, | Performed by: OBSTETRICS & GYNECOLOGY

## 2019-03-21 ENCOUNTER — TELEPHONE (OUTPATIENT)
Dept: OBSTETRICS AND GYNECOLOGY | Facility: CLINIC | Age: 24
End: 2019-03-21

## 2019-03-21 NOTE — TELEPHONE ENCOUNTER
"----- Message from Nubia Yee sent at 3/21/2019 10:43 AM CDT -----  Contact: ELIZ ALBA [[id number]]  Name of Who is Calling: ELIZ ALBA [[id number]]      What is the request in detail:  Patient called requesting a call.  Says, "she delivered her baby back in her home town on 03/12/2019 and she would like to schedule her post partum visit and also ask for a referral for pediatrician."  Please give a call back at your earliest convenience.   THANKS!           Can the clinic reply by [facility]: NO      Number to Call Back: ELIZ ALBA / # [phone]                                    "

## 2019-04-23 ENCOUNTER — POSTPARTUM VISIT (OUTPATIENT)
Dept: OBSTETRICS AND GYNECOLOGY | Facility: CLINIC | Age: 24
End: 2019-04-23
Payer: OTHER GOVERNMENT

## 2019-04-23 ENCOUNTER — TELEPHONE (OUTPATIENT)
Dept: OBSTETRICS AND GYNECOLOGY | Facility: CLINIC | Age: 24
End: 2019-04-23

## 2019-04-23 VITALS
WEIGHT: 147.06 LBS | BODY MASS INDEX: 29.65 KG/M2 | DIASTOLIC BLOOD PRESSURE: 80 MMHG | SYSTOLIC BLOOD PRESSURE: 142 MMHG | HEIGHT: 59 IN

## 2019-04-23 DIAGNOSIS — Z30.09 COUNSELING FOR BIRTH CONTROL, ORAL CONTRACEPTIVES: ICD-10-CM

## 2019-04-23 PROBLEM — Z34.00 PREGNANCY, SUPERVISION, NORMAL, FIRST: Status: RESOLVED | Noted: 2018-07-30 | Resolved: 2019-04-23

## 2019-04-23 PROBLEM — T83.31XA INTRAUTERINE DEVICE (IUD) CONTRACEPTIVE FAILURE RESULTING IN PREGNANCY: Status: RESOLVED | Noted: 2018-07-30 | Resolved: 2019-04-23

## 2019-04-23 PROBLEM — Z33.1 INTRAUTERINE DEVICE (IUD) CONTRACEPTIVE FAILURE RESULTING IN PREGNANCY: Status: RESOLVED | Noted: 2018-07-30 | Resolved: 2019-04-23

## 2019-04-23 PROCEDURE — 0503F POSTPARTUM CARE VISIT: CPT | Mod: ,,, | Performed by: OBSTETRICS & GYNECOLOGY

## 2019-04-23 PROCEDURE — 99213 OFFICE O/P EST LOW 20 MIN: CPT | Mod: PBBFAC | Performed by: OBSTETRICS & GYNECOLOGY

## 2019-04-23 PROCEDURE — 0503F PR POSTPARTUM CARE VISIT: ICD-10-PCS | Mod: ,,, | Performed by: OBSTETRICS & GYNECOLOGY

## 2019-04-23 PROCEDURE — 99999 PR PBB SHADOW E&M-EST. PATIENT-LVL III: CPT | Mod: PBBFAC,,, | Performed by: OBSTETRICS & GYNECOLOGY

## 2019-04-23 PROCEDURE — 99999 PR PBB SHADOW E&M-EST. PATIENT-LVL III: ICD-10-PCS | Mod: PBBFAC,,, | Performed by: OBSTETRICS & GYNECOLOGY

## 2019-04-23 RX ORDER — ACETAMINOPHEN AND CODEINE PHOSPHATE 120; 12 MG/5ML; MG/5ML
1 SOLUTION ORAL DAILY
Qty: 28 TABLET | Refills: 11 | Status: SHIPPED | OUTPATIENT
Start: 2019-04-23 | End: 2020-01-06

## 2019-04-23 RX ORDER — IBUPROFEN 800 MG/1
TABLET ORAL
COMMUNITY
Start: 2019-03-15 | End: 2019-08-23

## 2019-04-23 NOTE — TELEPHONE ENCOUNTER
Spoke with patient,  Per , pt is to start her new birth control today.   Pt asked if cycle would be irregular through first pack.   Instructed pt that she might have an irregular cycle as her body gets used to new Birth Control pills and to use pills as directed.    Pt verbalized understanding

## 2019-04-23 NOTE — TELEPHONE ENCOUNTER
----- Message from Todd Cuenca sent at 4/23/2019  3:12 PM CDT -----  Contact: ELIZ ALBA [[id number]]  Name of Who is Calling: ELIZ ALBA [[id number]]      What is the request in detail: Patient would like to know when to start her birth control. States her cycle stopped two weeks ago. Please advise      Can the clinic reply by [facility]: yes      What Number to Call Back if not in [facility]: [phone]

## 2019-04-23 NOTE — PROGRESS NOTES
Suzanna Cai is a 24 y.o. female  presents for a postpartum visit.  She is status post c/s 5 weeks ago.  Her hospitalization was not complicated.  She is breastfeeding.  She desires oral progesterone-only contraceptive for contraception.  She denies postpartum depression.    Her last pap was normal    Past Medical History:   Diagnosis Date    Blood clotting tendency     Frequent headaches     has MRI  and neg per old records    History of concussion     Scoliosis     Urticaria      Past Surgical History:   Procedure Laterality Date    TONSILLECTOMY       Review of patient's allergies indicates:  No Known Allergies    Current Outpatient Medications:     fluocinolone and shower cap (DERMA-SMOOTHE/FS SCALP OIL) 0.01 % Oil, Apply oil to damp scalp nightly and cover with shower cap., Disp: 1 Bottle, Rfl: 3    ibuprofen (ADVIL,MOTRIN) 800 MG tablet, , Disp: , Rfl:     PNV,calcium 72-iron,carb-folic (PRENATAL PLUS) 29 mg iron- 1 mg Tab, Take 1 tablet by mouth once daily., Disp: 90 tablet, Rfl: 3    epinephrine (EPIPEN 2-DAVID) 0.3 mg/0.3 mL AtIn, Inject 0.3 mLs (0.3 mg total) into the muscle once. Prn throat swelling and call 911!, Disp: 2 each, Rfl: 0    norethindrone (ORTHO MICRONOR) 0.35 mg tablet, Take 1 tablet (0.35 mg total) by mouth once daily., Disp: 28 tablet, Rfl: 11      Vitals:    19 1119   BP: (!) 142/80       GENERAL: healthy, no distress  ABDOMEN: Abdomen soft, nontender. BS normal. No masses, organomegaly or scars. and no masses, hepatosplenomegaly, no hernias  EXTERNAL GENITALIA POSTPARTUM: normal, well-healed, without lesions or masses   VAGINA POSTPARTUM: normal, well-healed, physiologic discharge, without lesions   CERVIX POSTPARTUM: normal, well-healed, without lesions   UTERUS POSTPARTUM: normal size, well involuted, firm, non-tender, ADNEXA POSTPARTUM: no masses palpable and nontender    Assessment:  Normal postpartum exam    Plan:  Routine follow up.

## 2019-05-27 ENCOUNTER — OFFICE VISIT (OUTPATIENT)
Dept: INTERNAL MEDICINE | Facility: CLINIC | Age: 24
End: 2019-05-27
Attending: FAMILY MEDICINE
Payer: OTHER GOVERNMENT

## 2019-05-27 VITALS
WEIGHT: 146.81 LBS | OXYGEN SATURATION: 96 % | SYSTOLIC BLOOD PRESSURE: 122 MMHG | HEART RATE: 100 BPM | DIASTOLIC BLOOD PRESSURE: 84 MMHG | HEIGHT: 59 IN | BODY MASS INDEX: 29.6 KG/M2

## 2019-05-27 DIAGNOSIS — G43.101 MIGRAINE WITH AURA AND WITH STATUS MIGRAINOSUS, NOT INTRACTABLE: Primary | ICD-10-CM

## 2019-05-27 PROCEDURE — 99999 PR PBB SHADOW E&M-EST. PATIENT-LVL III: ICD-10-PCS | Mod: PBBFAC,,, | Performed by: FAMILY MEDICINE

## 2019-05-27 PROCEDURE — 99999 PR PBB SHADOW E&M-EST. PATIENT-LVL III: CPT | Mod: PBBFAC,,, | Performed by: FAMILY MEDICINE

## 2019-05-27 PROCEDURE — 99213 OFFICE O/P EST LOW 20 MIN: CPT | Mod: PBBFAC | Performed by: FAMILY MEDICINE

## 2019-05-27 PROCEDURE — 99214 PR OFFICE/OUTPT VISIT, EST, LEVL IV, 30-39 MIN: ICD-10-PCS | Mod: S$PBB,,, | Performed by: FAMILY MEDICINE

## 2019-05-27 PROCEDURE — 99214 OFFICE O/P EST MOD 30 MIN: CPT | Mod: S$PBB,,, | Performed by: FAMILY MEDICINE

## 2019-05-27 RX ORDER — SUMATRIPTAN SUCCINATE 100 MG/1
100 TABLET ORAL
Qty: 9 TABLET | Refills: 11 | Status: SHIPPED | OUTPATIENT
Start: 2019-05-27 | End: 2019-05-28 | Stop reason: SDUPTHER

## 2019-05-27 NOTE — PROGRESS NOTES
"CHIEF COMPLAINT:  Recurrent migraine headaches    HISTORY OF PRESENT ILLNESS: The patient presents with recurrent migraine headaches.  She had a break in the headaches when she was pregnant.  Unfortunately her headaches have returned now that she is 2 months postpartum.  They occur several times a week.  She has previously been on Imitrex.  Over-the-counter medicines have not helped.  There is no history of trauma.  She follows with Neurology    REVIEW OF SYSTEMS:  GENERAL: No fever, chills, fatigability or weight loss.  SKIN: No rashes, itching or changes in color or texture of skin.  HEAD: No recent head trauma.  EYES: Visual acuity fine. No photophobia, ocular pain or diplopia.  EARS: Denies ear pain, discharge or vertigo.  NOSE: No loss of smell, no epistaxis or postnasal drip.  MOUTH & THROAT: No hoarseness or change in voice. No excessive gum bleeding.  NODES: Denies swollen glands.  CHEST: Denies ELLIS, cyanosis, wheezing, cough and sputum production.  CARDIOVASCULAR: Denies chest pain, PND, orthopnea or reduced exercise tolerance.  ABDOMEN: Appetite fine. No weight loss. Denies diarrhea, abdominal pain, hematemesis or blood in stool.  URINARY: No flank pain, dysuria or hematuria.  PERIPHERAL VASCULAR: No claudication or cyanosis.  MUSCULOSKELETAL: No joint stiffness or swelling.  The patient does have back pain.  NEUROLOGIC: No history of seizures, paralysis, alteration of gait or coordination.    SOCIAL HISTORY: Unchanged since recent note by PCP.    PHYSICAL EXAMINATION:   Blood pressure 122/84, pulse 100, height 4' 11" (1.499 m), weight 66.6 kg (146 lb 13.2 oz), SpO2 96 %, currently breastfeeding.  APPEARANCE: Well nourished, well developed, in no acute distress.    HEAD: Normocephalic, atraumatic.  EYES: PERRL. EOMI.  Conjunctivae without injection and  anicteric  EARS: TM's intact. Light reflex normal. No retraction or perforation.    NOSE: Mucosa pink. Airway clear.  MOUTH & THROAT: No tonsillar " enlargement. No pharyngeal erythema or exudate. No stridor.  NECK: Supple.   NODES: No cervical, axillary or inguinal lymph node enlargement.  ABDOMEN: Bowel sounds normal. Not distended. Soft. No tenderness or masses.  No ascites is noted.  MUSCULOSKELETAL:  There is no clubbing, cyanosis, or edema of the extremities x4.  There is full range of motion of the lumbar spine.  There is full range of motion of the extremities x4.  There is no deformity noted.    NEUROLOGIC:       Normal speech development.      Hearing normal.      Normal gait.      Motor and sensory exams grossly normal.  PSYCHIATRIC: Patient is alert and oriented x3.  Thought processes are all normal.  There is no homicidality.  There is no suicidality.  There is no evidence of psychosis.    LABORATORY/RADIOLOGY: Chart reviewed    ASSESSMENT:   Recurrent migraine headaches    PLAN:  Excedrin migraine and Tylenol  Magnesium supplement  imitrex  F/u neuro

## 2019-05-28 ENCOUNTER — OFFICE VISIT (OUTPATIENT)
Dept: NEUROLOGY | Facility: CLINIC | Age: 24
End: 2019-05-28
Payer: OTHER GOVERNMENT

## 2019-05-28 VITALS
SYSTOLIC BLOOD PRESSURE: 127 MMHG | DIASTOLIC BLOOD PRESSURE: 85 MMHG | BODY MASS INDEX: 29.77 KG/M2 | HEART RATE: 74 BPM | WEIGHT: 147.69 LBS | HEIGHT: 59 IN

## 2019-05-28 DIAGNOSIS — G43.109 MIGRAINE WITH AURA AND WITHOUT STATUS MIGRAINOSUS, NOT INTRACTABLE: Primary | ICD-10-CM

## 2019-05-28 PROCEDURE — 99999 PR PBB SHADOW E&M-EST. PATIENT-LVL III: CPT | Mod: PBBFAC,,, | Performed by: PSYCHIATRY & NEUROLOGY

## 2019-05-28 PROCEDURE — 99999 PR PBB SHADOW E&M-EST. PATIENT-LVL III: ICD-10-PCS | Mod: PBBFAC,,, | Performed by: PSYCHIATRY & NEUROLOGY

## 2019-05-28 PROCEDURE — 99213 OFFICE O/P EST LOW 20 MIN: CPT | Mod: PBBFAC | Performed by: PSYCHIATRY & NEUROLOGY

## 2019-05-28 PROCEDURE — 99215 PR OFFICE/OUTPT VISIT, EST, LEVL V, 40-54 MIN: ICD-10-PCS | Mod: S$PBB,,, | Performed by: PSYCHIATRY & NEUROLOGY

## 2019-05-28 PROCEDURE — 99215 OFFICE O/P EST HI 40 MIN: CPT | Mod: S$PBB,,, | Performed by: PSYCHIATRY & NEUROLOGY

## 2019-05-28 RX ORDER — SUMATRIPTAN SUCCINATE 100 MG/1
100 TABLET ORAL 2 TIMES DAILY PRN
Qty: 15 TABLET | Refills: 11 | Status: SHIPPED | OUTPATIENT
Start: 2019-05-28 | End: 2020-01-08 | Stop reason: SDUPTHER

## 2019-05-28 NOTE — PROGRESS NOTES
"Name: Suzanna Cai  MRN:[id number]   CSN: [id number]  Date of service: 2019  Age:24 y.o.   Gender:female   Referring Physician/Service: No referring provider defined for this encounter.   The patient is here today with:  , 2-month-old son    Neurology Clinic: Initial Visit    CHIEF COMPLAINT:  Migraines    HPI:  24-year-old woman with headaches previously well treated with sumatriptan 100 mg. Headaches were getting better. Sumatriptan was finished and she did not need to refill while pregnant. No preeclampsia. Some foot swelling at five months pregnant. Now postpartum, headaches every other day. Hammer hitting in the head + pressure. Sleep doesn't work. Extended release tylenol doesn't help. 800mg motrin didn't help. Mild headache yesterday. Excedrin and sumatriptan. Medicine for headaches every day over the last month. C section. Up frequently with the baby.      Headaches frontal and bitemporal. Couple hours to all day, sometimes goes away with sleep, no visual symptoms, no nausea, no vomiting. Triggers stress, poor sleep. No one in the family with headaches.     ROS:  No headache currently.  Not enough sleep.  Seems to be coping as expected with a .  Otherwise denies loss of vision, blurred vision, diplopia, dysarthria, dysphagia, lightheadedness, vertigo, tinnitus or hearing difficulty. Denies difficulties producing or comprehending speech.  Denies focal weakness, numbness, paresthesias. No bowel or bladder incontinence or retention. Denies difficulty with gait. Denies cough, shortness of breath.  Denies chest pain or tightness, palpitations.  Denies nausea, vomiting, diarrhea, constipation or abdominal pain.      EXAM:   - Vitals: /85   Pulse 74   Ht 4' 11" (1.499 m)   Wt 67 kg (147 lb 11.3 oz)   BMI 29.83 kg/m²    - General: Awake, cooperative, NAD.  - HEENT: NC/AT  - Neck: Supple  - Pulmonary: no increased WOB  - Cardiac: well perfused   - Abdomen: soft, nontender, " nondistended  - Extremities: no edema, pulses palpated  - Skin: no rashes or lesions noted.     NEURO EXAM:   - Mental Status: Awake, alert, oriented x 3. Able to relate history without difficulty. Attentive to examiner. Language is fluent with intact repetition and comprehension. Normal prosody. There were no paraphasic errors. Able to name both high and low frequency objects. Speech was not dysarthric. Able to follow both midline and appendicular commands. Good knowledge of current events. There was no evidence of apraxia or neglect.    - Cranial Nerves:  PERRL 3 to 2mm and brisk. VFF to confrontation. EOMI without nystagmus. Normal saccades. Facial sensation intact to light touch. No facial droop. Hearing intact to room voice. 5/5 strength in trapezii and SCM bilaterally.     - Motor: Normal bulk and tone throughout. No pronator drift bilaterally. No adventitious movements such as tremor or asterixis noted. 5/5 in all major muscle groups including bilateral Delt Bic Tri WrE WrF  FFl FE IO IP Quad Ham TA Gastroc.   - Sensory: No deficits to light touch.  - Coordination:  No dysmetria on FNF.  - Gait: Good initiation. Narrow-based, normal stride and arm swing. Romberg negative.    LABS:  No recent labs    IMAGING:  No recent imaging    PLAN:  A 24-year-old woman with headaches, likely multifactorial, well treated with sumatriptan 100 mg in the past.  She has not taken this medication in a while because headaches were not a problem during pregnancy.  Reordered at this visit.  Counseled about analgesic overuse headache.  Sumatriptan or Tylenol or NSAIDs, but no more than once every 4 days.  Use patient portal with any concerns, new symptoms, medication side effects.  Return to clinic 3-6 months.     INSTRUCTIONS:  You came to clinic because of headache pain. We reordered sumatriptan. You may take one pill at headache onset. If the headache persists after an hour, you may take a second pill. You can alternate with  tylenol and ibuprofen or naproxen. But, do not take any of these medications more than once every four days. I also encourage ice/heat/massage. Come back in 3-6 months.     Porsha Phillips MD PhD  Neurology-Epilepsy  [facility].  [facility]      Problem List Items Addressed This Visit     Migraine with aura and without status migrainosus, not intractable - Primary    Relevant Medications    sumatriptan (IMITREX) 100 MG tablet            More than 50% of the 55 minutes spent with the patient (as well as family/caregiver(s) was spent on face-to-face counseling.    PAST MEDICAL HISTORY:   Active Ambulatory Problems     Diagnosis Date Noted    Migraine with aura and without status migrainosus, not intractable     Functional diarrhea 06/07/2018     Resolved Ambulatory Problems     Diagnosis Date Noted    Abdominal cramping 06/07/2018    Pregnancy, supervision, normal, first 07/30/2018    Intrauterine device (IUD) contraceptive failure resulting in pregnancy 07/30/2018     Past Medical History:   Diagnosis Date    Blood clotting tendency     Frequent headaches 2013    History of concussion     Scoliosis     Urticaria         PAST SURGICAL HISTORY:   Past Surgical History:   Procedure Laterality Date    TONSILLECTOMY          ALLERGIES: Patient has no known allergies.   CURRENT MEDICATIONS:   Current Outpatient Medications   Medication Sig Dispense Refill    fluocinolone and shower cap (DERMA-SMOOTHE/FS SCALP OIL) 0.01 % Oil Apply oil to damp scalp nightly and cover with shower cap. 1 Bottle 3    norethindrone (ORTHO MICRONOR) 0.35 mg tablet Take 1 tablet (0.35 mg total) by mouth once daily. 28 tablet 11    PNV,calcium 72-iron,carb-folic (PRENATAL PLUS) 29 mg iron- 1 mg Tab Take 1 tablet by mouth once daily. 90 tablet 3    sumatriptan (IMITREX) 100 MG tablet Take 1 tablet (100 mg total) by mouth 2 (two) times daily as needed for Migraine. Take 1 pill at onset, may take 2nd pill after 1  hour 15 tablet 11    epinephrine (EPIPEN 2-DAVID) 0.3 mg/0.3 mL AtIn Inject 0.3 mLs (0.3 mg total) into the muscle once. Prn throat swelling and call 911! 2 each 0    ibuprofen (ADVIL,MOTRIN) 800 MG tablet        No current facility-administered medications for this visit.         FAMILY HISTORY:   Family History   Problem Relation Age of Onset    Ovarian cancer Maternal Grandmother 66    Hypertension Maternal Grandmother     Diabetes Maternal Grandmother     No Known Problems Mother     Hypertension Father     Eczema Sister     Ulcers Sister     Scoliosis Sister     Psoriasis Sister     No Known Problems Brother     No Known Problems Maternal Aunt     No Known Problems Maternal Uncle     No Known Problems Paternal Aunt     No Known Problems Paternal Uncle     Hypertension Maternal Grandfather     Hypertension Paternal Grandmother     No Known Problems Paternal Grandfather     Breast cancer Cousin 34    Colon cancer Neg Hx     Melanoma Neg Hx     Lupus Neg Hx     Amblyopia Neg Hx     Blindness Neg Hx     Cancer Neg Hx     Cataracts Neg Hx     Glaucoma Neg Hx     Macular degeneration Neg Hx     Retinal detachment Neg Hx     Strabismus Neg Hx     Stroke Neg Hx     Thyroid disease Neg Hx          SOCIAL HISTORY:   Social History     Socioeconomic History    Marital status:      Spouse name: Not on file    Number of children: Not on file    Years of education: Not on file    Highest education level: Not on file   Occupational History    Occupation:    Social Needs    Financial resource strain: Not on file    Food insecurity:     Worry: Not on file     Inability: Not on file    Transportation needs:     Medical: Not on file     Non-medical: Not on file   Tobacco Use    Smoking status: Never Smoker    Smokeless tobacco: Never Used   Substance and Sexual Activity    Alcohol use: Yes     Comment: socially/ pre pregnancy     Drug use: No    Sexual activity: Yes      Partners: Male     Comment:  Janis placed July 18,2017; since October 2016: sexually active since age 14   Lifestyle    Physical activity:     Days per week: Not on file     Minutes per session: Not on file    Stress: Not on file   Relationships    Social connections:     Talks on phone: Not on file     Gets together: Not on file     Attends Congregation service: Not on file     Active member of club or organization: Not on file     Attends meetings of clubs or organizations: Not on file     Relationship status: Not on file   Other Topics Concern    Are you pregnant or think you may be? Not Asked    Breast-feeding Not Asked   Social History Narrative    From [place]    Moved to [facility] Nov 2016    , living with  who is in     Not exercising         Questions and concerns raised by the patient and family/care-giver(s) were addressed and they indicated understanding of everything discussed and agreed to plans as above.    Porsha Phillips MD PhD  Neurology-Epilepsy  [facility].  [facility]

## 2019-05-28 NOTE — PATIENT INSTRUCTIONS
You came to clinic because of headache pain. We reordered sumatriptan. You may take one pill at headache onset. If the headache persists after an hour, you may take a second pill. You can alternate with tylenol and ibuprofen or naproxen. But, do not take any of these medications more than once every four days. I also encourage ice/heat/massage. Come back in 3-6 months.     Porsha Phillips MD PhD  Neurology-Epilepsy  [facility].  [facility]

## 2019-06-19 ENCOUNTER — OFFICE VISIT (OUTPATIENT)
Dept: PODIATRY | Facility: CLINIC | Age: 24
End: 2019-06-19
Payer: OTHER GOVERNMENT

## 2019-06-19 VITALS
HEART RATE: 89 BPM | BODY MASS INDEX: 30.64 KG/M2 | HEIGHT: 59 IN | DIASTOLIC BLOOD PRESSURE: 82 MMHG | WEIGHT: 152 LBS | SYSTOLIC BLOOD PRESSURE: 131 MMHG

## 2019-06-19 DIAGNOSIS — M20.42 HAMMER TOE OF LEFT FOOT: Primary | ICD-10-CM

## 2019-06-19 PROCEDURE — 99213 OFFICE O/P EST LOW 20 MIN: CPT | Mod: PBBFAC | Performed by: PODIATRIST

## 2019-06-19 PROCEDURE — 99214 PR OFFICE/OUTPT VISIT, EST, LEVL IV, 30-39 MIN: ICD-10-PCS | Mod: S$PBB,,, | Performed by: PODIATRIST

## 2019-06-19 PROCEDURE — 99999 PR PBB SHADOW E&M-EST. PATIENT-LVL III: ICD-10-PCS | Mod: PBBFAC,,, | Performed by: PODIATRIST

## 2019-06-19 PROCEDURE — 99999 PR PBB SHADOW E&M-EST. PATIENT-LVL III: CPT | Mod: PBBFAC,,, | Performed by: PODIATRIST

## 2019-06-19 PROCEDURE — 99214 OFFICE O/P EST MOD 30 MIN: CPT | Mod: S$PBB,,, | Performed by: PODIATRIST

## 2019-06-22 NOTE — PROGRESS NOTES
Subjective:      Patient ID: Suzanna Cai is a 24 y.o. female.    Chief Complaint: Ingrown Toenail (lt great toe no pain tpday)    Suzanna is a 24 y.o. female who presents to the podiatry clinic  with complaint of  left foot pain. Onset of the symptoms was several weeks ago. Precipitating event: none known. Current symptoms include: ability to bear weight, but with some pain. Aggravating factors: any weight bearing. Symptoms have stabilized. Patient has had no prior foot problems. Evaluation to date: none. Treatment to date: none. Patients rates pain 5/10 on pain scale.        Review of Systems   Constitution: Negative for chills and fever.   HENT: Negative for congestion and tinnitus.    Eyes: Negative for double vision and visual disturbance.   Cardiovascular: Negative for chest pain and claudication.   Respiratory: Negative for hemoptysis and shortness of breath.    Endocrine: Negative for cold intolerance and heat intolerance.   Hematologic/Lymphatic: Negative for adenopathy and bleeding problem.   Skin: Negative for color change and nail changes.   Musculoskeletal: Negative for myalgias and stiffness.   Gastrointestinal: Negative for nausea and vomiting.   Genitourinary: Negative for dysuria and hematuria.   Neurological: Negative for numbness and paresthesias.   Psychiatric/Behavioral: Negative for altered mental status and suicidal ideas.   Allergic/Immunologic: Negative for environmental allergies and persistent infections.           Objective:      Physical Exam   Constitutional: She is oriented to person, place, and time. She appears well-developed and well-nourished.   Cardiovascular:   Pulses:       Dorsalis pedis pulses are 2+ on the right side, and 2+ on the left side.        Posterior tibial pulses are 2+ on the right side, and 2+ on the left side.   Pulmonary/Chest: Effort normal.   Musculoskeletal: Normal range of motion.   Inspection and palpation of the muscles joints and bones of both lower  extremities reveal that muscle strength for the anterior lateral and posterior muscle groups and intrinsic muscle groups of the foot are all 5 over 5 symmetrical.  Ankle subtalar midtarsal and digital joint range of motion are within normal limits, nonpainful, without crepitus or effusion.  Patient exhibits a normal angle and base of gait.  Palpation of the tendons reveal no defects.  Mild flexible contractures noted to bilateral lesser digits at the proximal interphalangeal joint with minimal discomfort.   Neurological: She is alert and oriented to person, place, and time.   Sharp dull light touch vibratory proprioceptive sensation are intact bilaterally.  Deep tendon reflexes to patellar and Achilles tendon are symmetrical 2 over 4 bilaterally.  No ankle clonus or Babinski reflexes noted bilaterally.  Coordination is normal to both feet and lower extremities.   Skin: Skin is warm and dry. Capillary refill takes 2 to 3 seconds.   Skin turgor is normal bilaterally.  Skin texture is well hydrated to both lower extremities.  No lesions or rashes or wounds appreciated bilaterally.  Nail plates 1 through 5 bilaterally are within normal limits for length and thickness.  No nail clubbing or incurvation noted.   Psychiatric: She has a normal mood and affect.   Vitals reviewed.            Assessment:       Encounter Diagnosis   Name Primary?    Hammer toe of left foot Yes         Plan:       Suzanna was seen today for ingrown toenail.    Diagnoses and all orders for this visit:    Hammer toe of left foot      I counseled the patient on her conditions, their implications and medical management.      Conservative and surgical options discussed.  Appropriate shoe gear and inserts discussed.  Follow up when the condition flares as she is having no discomfort today.  .

## 2019-08-23 ENCOUNTER — OFFICE VISIT (OUTPATIENT)
Dept: OBSTETRICS AND GYNECOLOGY | Facility: CLINIC | Age: 24
End: 2019-08-23
Payer: OTHER GOVERNMENT

## 2019-08-23 VITALS
BODY MASS INDEX: 31.39 KG/M2 | WEIGHT: 155.44 LBS | DIASTOLIC BLOOD PRESSURE: 70 MMHG | SYSTOLIC BLOOD PRESSURE: 120 MMHG

## 2019-08-23 DIAGNOSIS — L29.9 ITCHING: ICD-10-CM

## 2019-08-23 DIAGNOSIS — R23.4 BREAST SKIN CHANGES: Primary | ICD-10-CM

## 2019-08-23 PROCEDURE — 99213 PR OFFICE/OUTPT VISIT, EST, LEVL III, 20-29 MIN: ICD-10-PCS | Mod: S$PBB,,, | Performed by: NURSE PRACTITIONER

## 2019-08-23 PROCEDURE — 99999 PR PBB SHADOW E&M-EST. PATIENT-LVL III: CPT | Mod: PBBFAC,,, | Performed by: NURSE PRACTITIONER

## 2019-08-23 PROCEDURE — 99213 OFFICE O/P EST LOW 20 MIN: CPT | Mod: PBBFAC | Performed by: NURSE PRACTITIONER

## 2019-08-23 PROCEDURE — 99213 OFFICE O/P EST LOW 20 MIN: CPT | Mod: S$PBB,,, | Performed by: NURSE PRACTITIONER

## 2019-08-23 PROCEDURE — 99999 PR PBB SHADOW E&M-EST. PATIENT-LVL III: ICD-10-PCS | Mod: PBBFAC,,, | Performed by: NURSE PRACTITIONER

## 2019-08-23 RX ORDER — CLOBETASOL PROPIONATE 0.5 MG/G
OINTMENT TOPICAL 2 TIMES DAILY
Qty: 45 G | Refills: 1 | Status: SHIPPED | OUTPATIENT
Start: 2019-08-23 | End: 2019-10-03

## 2019-08-23 NOTE — PROGRESS NOTES
CC: Breast skin changes, breast itching     Suzanna Cai  complains of bilateral breast skin changes and itching that she noticed 2 weeks ago. It is located under bilateral areolas L<R.  She denies pain. She denies nipple discharge.  She is not breastfeeding or pumping- stopped in 2019.  Reports family h/o breast cancer in cousin- with premenopausal diagnosis. UPT is negative.       ROS:  GENERAL: No chills, fatigability or weight loss.  NEUROLOGICAL: No headaches. No vision changes.  CARDIOVASCULAR: No chest pain. No shortness of breath. No leg cramps.  ABDOMEN: No abdominal pain. Denies nausea. Denies vomiting. No diarrhea. No constipation  BREAST: See HPI  URINARY: No incontinence, no nocturia, no frequency and no dysuria.  VULVAR: No pain, no lesions and no itching.  VAGINA: No relaxation, no itching, no discharge, no abnormal bleeding and no lesions.      Vitals:    19 1340   BP: 120/70     GENERAL: healthy  Neck: neck supply, no thryomegaly  LYMPH: No epitrochlear, supraclavicular, or axillary lymphadenopathy  BREASTS: Aymmetrical L>R, + flaking of skin below areola.  No visible lesions. No palpable masses, nipple discharge or adenopathy bilaterally.  ABDOMEN: no masses, hepatosplenomegaly, hernias    Suzanna was seen today for breast pain.    Diagnoses and all orders for this visit:    Breast skin changes  -     US Breast Bilateral Complete; Future  -     clobetasol 0.05% (TEMOVATE) 0.05 % Oint; Apply topically 2 (two) times daily.    Itching  -     US Breast Bilateral Complete; Future  -     clobetasol 0.05% (TEMOVATE) 0.05 % Oint; Apply topically 2 (two) times daily.        Breast US   Clobetasol ointment BID X 7 days  Discussed importance of keeping area clean and dry  Discussed will refer to breast surgeon pending any abnormal imaging.     F/U PRN no resolution of symptoms    Nikia Yuan, TANISHAP-C

## 2019-08-27 NOTE — Clinical Note
Pt. Continues to spit stating he cannot swallow saliva. Discussed with Dr. Alford. Pt. And family updated on plan of care: ct scan head.    She seems to be doing well on the magnesium and sumatriptan.

## 2019-08-29 ENCOUNTER — HOSPITAL ENCOUNTER (OUTPATIENT)
Dept: RADIOLOGY | Facility: OTHER | Age: 24
Discharge: HOME | End: 2019-08-29
Attending: NURSE PRACTITIONER
Payer: OTHER GOVERNMENT

## 2019-08-29 DIAGNOSIS — L29.9 ITCHING: ICD-10-CM

## 2019-08-29 DIAGNOSIS — R23.4 BREAST SKIN CHANGES: ICD-10-CM

## 2019-08-29 PROCEDURE — 76642 US BREAST BILATERAL LIMITED: ICD-10-PCS | Mod: 26,50,, | Performed by: RADIOLOGY

## 2019-08-29 PROCEDURE — 76642 ULTRASOUND BREAST LIMITED: CPT | Mod: TC,50

## 2019-08-29 PROCEDURE — 76642 ULTRASOUND BREAST LIMITED: CPT | Mod: 26,50,, | Performed by: RADIOLOGY

## 2019-09-19 ENCOUNTER — TELEPHONE (OUTPATIENT)
Dept: OBSTETRICS AND GYNECOLOGY | Facility: CLINIC | Age: 24
End: 2019-09-19

## 2019-09-19 DIAGNOSIS — Z30.011 ENCOUNTER FOR INITIAL PRESCRIPTION OF CONTRACEPTIVE PILLS: Primary | ICD-10-CM

## 2019-09-19 NOTE — TELEPHONE ENCOUNTER
Spoke with pt    Pt is taking Ortho Micronor.  Pt states she started itching on her breast and went to the Dr who said the itching might be due to her BC.  Pt states she started itching all over 2 weeks ago.  Pt is requesting a change in her BC.  Pt is not breast feeding any longer, and has been taking BC daily including today.    Message sent to Dr Streeter for BC change.    Pt is scheduled to come in for BC consult as she is thinking about going back to injection for BC on 10/3/019

## 2019-09-19 NOTE — TELEPHONE ENCOUNTER
----- Message from Michelle Hopper sent at 9/19/2019  1:35 PM CDT -----  Contact: ELIZ ALBA [[id number]]  Name of Who is Calling : ELIZ ALBA [[id number]]    What is the request in detail :     Patient is requesting a call from staff she would like to discuss her symptoms she's having from the birth control and if there is something she can do or take  .....Please contact to further discuss and advise.    Can the clinic reply by [facility] :  Phone call only    What Number to Call Back : [phone]

## 2019-09-20 ENCOUNTER — PATIENT OUTREACH (OUTPATIENT)
Dept: ADMINISTRATIVE | Facility: OTHER | Age: 24
End: 2019-09-20

## 2019-09-20 ENCOUNTER — OFFICE VISIT (OUTPATIENT)
Dept: OBSTETRICS AND GYNECOLOGY | Facility: CLINIC | Age: 24
End: 2019-09-20
Payer: OTHER GOVERNMENT

## 2019-09-20 ENCOUNTER — LAB VISIT (OUTPATIENT)
Dept: LAB | Facility: OTHER | Age: 24
End: 2019-09-20
Attending: OBSTETRICS & GYNECOLOGY
Payer: OTHER GOVERNMENT

## 2019-09-20 VITALS
DIASTOLIC BLOOD PRESSURE: 80 MMHG | BODY MASS INDEX: 31.02 KG/M2 | HEIGHT: 59 IN | WEIGHT: 153.88 LBS | SYSTOLIC BLOOD PRESSURE: 128 MMHG

## 2019-09-20 DIAGNOSIS — L29.9 ITCHING: ICD-10-CM

## 2019-09-20 DIAGNOSIS — L29.9 ITCHING: Primary | ICD-10-CM

## 2019-09-20 DIAGNOSIS — Z30.013 ENCOUNTER FOR PRESCRIPTION FOR DEPO-PROVERA: ICD-10-CM

## 2019-09-20 LAB
ALBUMIN SERPL BCP-MCNC: 4.3 G/DL (ref 3.5–5.2)
ALP SERPL-CCNC: 96 U/L (ref 55–135)
ALT SERPL W/O P-5'-P-CCNC: 16 U/L (ref 10–44)
ANION GAP SERPL CALC-SCNC: 10 MMOL/L (ref 8–16)
AST SERPL-CCNC: 22 U/L (ref 10–40)
B-HCG UR QL: NEGATIVE
BILIRUB SERPL-MCNC: 0.9 MG/DL (ref 0.1–1)
BUN SERPL-MCNC: 11 MG/DL (ref 6–20)
CALCIUM SERPL-MCNC: 9.5 MG/DL (ref 8.7–10.5)
CHLORIDE SERPL-SCNC: 104 MMOL/L (ref 95–110)
CO2 SERPL-SCNC: 25 MMOL/L (ref 23–29)
CREAT SERPL-MCNC: 0.8 MG/DL (ref 0.5–1.4)
CTP QC/QA: YES
EST. GFR  (AFRICAN AMERICAN): >60 ML/MIN/1.73 M^2
EST. GFR  (NON AFRICAN AMERICAN): >60 ML/MIN/1.73 M^2
GLUCOSE SERPL-MCNC: 83 MG/DL (ref 70–110)
POTASSIUM SERPL-SCNC: 3.8 MMOL/L (ref 3.5–5.1)
PROT SERPL-MCNC: 8.1 G/DL (ref 6–8.4)
SODIUM SERPL-SCNC: 139 MMOL/L (ref 136–145)

## 2019-09-20 PROCEDURE — 36415 COLL VENOUS BLD VENIPUNCTURE: CPT

## 2019-09-20 PROCEDURE — 99214 OFFICE O/P EST MOD 30 MIN: CPT | Mod: S$PBB,,, | Performed by: OBSTETRICS & GYNECOLOGY

## 2019-09-20 PROCEDURE — 99999 PR PBB SHADOW E&M-EST. PATIENT-LVL III: CPT | Mod: PBBFAC,,, | Performed by: OBSTETRICS & GYNECOLOGY

## 2019-09-20 PROCEDURE — 99999 PR PBB SHADOW E&M-EST. PATIENT-LVL III: ICD-10-PCS | Mod: PBBFAC,,, | Performed by: OBSTETRICS & GYNECOLOGY

## 2019-09-20 PROCEDURE — 81025 URINE PREGNANCY TEST: CPT | Mod: PBBFAC | Performed by: OBSTETRICS & GYNECOLOGY

## 2019-09-20 PROCEDURE — 99214 PR OFFICE/OUTPT VISIT, EST, LEVL IV, 30-39 MIN: ICD-10-PCS | Mod: S$PBB,,, | Performed by: OBSTETRICS & GYNECOLOGY

## 2019-09-20 PROCEDURE — 80053 COMPREHEN METABOLIC PANEL: CPT

## 2019-09-20 PROCEDURE — 99213 OFFICE O/P EST LOW 20 MIN: CPT | Mod: PBBFAC | Performed by: OBSTETRICS & GYNECOLOGY

## 2019-09-20 RX ORDER — DESOGESTREL AND ETHINYL ESTRADIOL 21-5 (28)
1 KIT ORAL DAILY
Qty: 84 TABLET | Refills: 4 | Status: SHIPPED | OUTPATIENT
Start: 2019-09-20 | End: 2020-01-06

## 2019-09-20 RX ORDER — MEDROXYPROGESTERONE ACETATE 150 MG/ML
150 INJECTION, SUSPENSION INTRAMUSCULAR
Qty: 1 ML | Refills: 3 | Status: SHIPPED | OUTPATIENT
Start: 2019-09-20

## 2019-09-20 RX ORDER — TRIAMCINOLONE ACETONIDE 1 MG/G
OINTMENT TOPICAL 2 TIMES DAILY
Qty: 15 G | Refills: 1 | Status: SHIPPED | OUTPATIENT
Start: 2019-09-20 | End: 2019-09-23 | Stop reason: SDUPTHER

## 2019-09-20 NOTE — PROGRESS NOTES
CC: itching     HPI: zoe is overall well today. She complains of diffuse body itching which she feels like it is related to her birth control pills. She saw DAVID Gerardo in august at which time she only had itching on the breast. She had breast US which was normal. Also given rx for clobetasol, which didn't help. She started on new birth control pill in April, but itching only started 2 months ago. No other changes in medications, soaps, etc in the last 2 months. She would like to try going back on depo provera. She had iud previously and conceived with iud in place.       Past Medical History:   Diagnosis Date    Frequent headaches     has MRI  and neg per old records    History of concussion     Scoliosis     Urticaria        Past Surgical History:   Procedure Laterality Date     SECTION      TONSILLECTOMY         OB History        1    Para   1    Term   1       0    AB   0    Living   0       SAB   0    TAB   0    Ectopic   0    Multiple   0    Live Births   0                 Current Outpatient Medications on File Prior to Visit   Medication Sig Dispense Refill    clobetasol 0.05% (TEMOVATE) 0.05 % Oint Apply topically 2 (two) times daily. 45 g 1    fluocinolone and shower cap (DERMA-SMOOTHE/FS SCALP OIL) 0.01 % Oil Apply oil to damp scalp nightly and cover with shower cap. 1 Bottle 3    norethindrone (ORTHO MICRONOR) 0.35 mg tablet Take 1 tablet (0.35 mg total) by mouth once daily. 28 tablet 11    PNV,calcium 72-iron,carb-folic (PRENATAL PLUS) 29 mg iron- 1 mg Tab Take 1 tablet by mouth once daily. 90 tablet 3    sumatriptan (IMITREX) 100 MG tablet Take 1 tablet (100 mg total) by mouth 2 (two) times daily as needed for Migraine. Take 1 pill at onset, may take 2nd pill after 1 hour 15 tablet 11    desog-e.estradiol/e.estradiol (KARIVA) 0.15-0.02 mgx21 /0.01 mg x 5 per tablet Take 1 tablet by mouth once daily. 84 tablet 4     No current  "facility-administered medications on file prior to visit.          ROS:  GENERAL: Feeling well overall.   SKIN: no rash, but some excoriations from scratching  HEAD: Denies head injury or headache.   CHEST: Denies chest pain or shortness of breath.   CARDIOVASCULAR: Denies palpitations or left sided chest pain.   ABDOMEN: No abdominal pain  REPRODUCTIVE: See HPI.   MUSCULOSKELETAL: Denies joint pain or swelling.       Physical Exam:   /80 (BP Location: Right arm, Patient Position: Sitting)   Ht 4' 11" (1.499 m)   Wt 69.8 kg (153 lb 14.1 oz)   LMP 08/24/2019 (Exact Date)   BMI 31.08 kg/m²   General: No distress, well appearing  HEENT: normocephalic, atraumatic   Heart: Regular rate  Lungs: No increased work of breathing  Breasts: no obvious discoloration, redness, lesions  MS: lower extremeties symmetrical, no edema, no rash present  Pelvic Exam:  deferred  PSYCH: Normal affect, mood appropriate       ASSESSMENT/PLAN: 25 yo with itching. zoe feels like the itching is related to her birth control pills and would like to transition back to depo provera. Unsure etiology for her itching, started COCs in April and itching only started in August. May be alternative etiology for her symptoms. Counseled on options for contraception, she previously conceived with iud in place. Desires depo provera.     UPT negative     1. Depo provera today. Recommend backup for 7 days.  2. Desires gardisil, ordered and will schedule her for vaccines  3. Written information given about nexplanon  4. Will obtain cmp today   5. rx for triamcinolone given  6. If itching doesn't improve with change in contraception, recommend fu with pcp.    Elise Yadav MD  Obstetrics and Gynecology  [facility]    "

## 2019-09-23 ENCOUNTER — PATIENT MESSAGE (OUTPATIENT)
Dept: OBSTETRICS AND GYNECOLOGY | Facility: CLINIC | Age: 24
End: 2019-09-23

## 2019-09-23 DIAGNOSIS — L29.9 ITCHING: Primary | ICD-10-CM

## 2019-09-23 RX ORDER — TRIAMCINOLONE ACETONIDE 1 MG/G
OINTMENT TOPICAL 2 TIMES DAILY
Qty: 15 G | Refills: 1 | Status: SHIPPED | OUTPATIENT
Start: 2019-09-23 | End: 2019-10-03 | Stop reason: SDUPTHER

## 2019-09-23 NOTE — TELEPHONE ENCOUNTER
----- Message from Rebecca Padilla sent at 9/23/2019 11:22 AM CDT -----  Contact: ELIZ ALBA [[id number]]  Type:  Patient Returning Call    Who Called: ELIZ ALBA [[id number]]    Who Left Message for Patient:     Does the patient know what this is regarding?: no    Best Call Back Number: [phone]    Additional Information:  Would like inform 10/4 2:15pm works for her

## 2019-10-01 ENCOUNTER — PATIENT OUTREACH (OUTPATIENT)
Dept: ADMINISTRATIVE | Facility: OTHER | Age: 24
End: 2019-10-01

## 2019-10-03 ENCOUNTER — OFFICE VISIT (OUTPATIENT)
Dept: INTERNAL MEDICINE | Facility: CLINIC | Age: 24
End: 2019-10-03
Payer: OTHER GOVERNMENT

## 2019-10-03 ENCOUNTER — OFFICE VISIT (OUTPATIENT)
Dept: OBSTETRICS AND GYNECOLOGY | Facility: CLINIC | Age: 24
End: 2019-10-03
Payer: OTHER GOVERNMENT

## 2019-10-03 VITALS
DIASTOLIC BLOOD PRESSURE: 80 MMHG | HEIGHT: 59 IN | BODY MASS INDEX: 30.71 KG/M2 | WEIGHT: 152.31 LBS | SYSTOLIC BLOOD PRESSURE: 120 MMHG

## 2019-10-03 VITALS
SYSTOLIC BLOOD PRESSURE: 139 MMHG | HEIGHT: 59 IN | DIASTOLIC BLOOD PRESSURE: 70 MMHG | HEART RATE: 86 BPM | OXYGEN SATURATION: 100 % | WEIGHT: 153.88 LBS | BODY MASS INDEX: 31.02 KG/M2

## 2019-10-03 DIAGNOSIS — Z30.42 ENCOUNTER FOR SURVEILLANCE OF INJECTABLE CONTRACEPTIVE: ICD-10-CM

## 2019-10-03 DIAGNOSIS — L29.9 PRURITUS: Primary | ICD-10-CM

## 2019-10-03 DIAGNOSIS — G43.109 MIGRAINE WITH AURA AND WITHOUT STATUS MIGRAINOSUS, NOT INTRACTABLE: ICD-10-CM

## 2019-10-03 PROCEDURE — 99215 PR OFFICE/OUTPT VISIT, EST, LEVL V, 40-54 MIN: ICD-10-PCS | Mod: S$PBB,,, | Performed by: INTERNAL MEDICINE

## 2019-10-03 PROCEDURE — 99999 PR PBB SHADOW E&M-EST. PATIENT-LVL III: ICD-10-PCS | Mod: PBBFAC,,, | Performed by: INTERNAL MEDICINE

## 2019-10-03 PROCEDURE — 99999 PR PBB SHADOW E&M-EST. PATIENT-LVL III: CPT | Mod: PBBFAC,,, | Performed by: INTERNAL MEDICINE

## 2019-10-03 PROCEDURE — 99999 PR PBB SHADOW E&M-EST. PATIENT-LVL II: ICD-10-PCS | Mod: PBBFAC,,, | Performed by: OBSTETRICS & GYNECOLOGY

## 2019-10-03 PROCEDURE — 99215 OFFICE O/P EST HI 40 MIN: CPT | Mod: S$PBB,,, | Performed by: INTERNAL MEDICINE

## 2019-10-03 PROCEDURE — 99213 OFFICE O/P EST LOW 20 MIN: CPT | Mod: S$PBB,,, | Performed by: OBSTETRICS & GYNECOLOGY

## 2019-10-03 PROCEDURE — 99999 PR PBB SHADOW E&M-EST. PATIENT-LVL II: CPT | Mod: PBBFAC,,, | Performed by: OBSTETRICS & GYNECOLOGY

## 2019-10-03 PROCEDURE — 99213 PR OFFICE/OUTPT VISIT, EST, LEVL III, 20-29 MIN: ICD-10-PCS | Mod: S$PBB,,, | Performed by: OBSTETRICS & GYNECOLOGY

## 2019-10-03 PROCEDURE — 99212 OFFICE O/P EST SF 10 MIN: CPT | Mod: PBBFAC,27 | Performed by: OBSTETRICS & GYNECOLOGY

## 2019-10-03 PROCEDURE — 99213 OFFICE O/P EST LOW 20 MIN: CPT | Mod: PBBFAC | Performed by: INTERNAL MEDICINE

## 2019-10-03 RX ORDER — HYDROXYZINE PAMOATE 25 MG/1
25 CAPSULE ORAL EVERY 4 HOURS PRN
Qty: 30 CAPSULE | Refills: 5 | Status: SHIPPED | OUTPATIENT
Start: 2019-10-03

## 2019-10-03 RX ORDER — TRIAMCINOLONE ACETONIDE 1 MG/G
OINTMENT TOPICAL 2 TIMES DAILY
Qty: 30 G | Refills: 5 | Status: SHIPPED | OUTPATIENT
Start: 2019-10-03 | End: 2020-01-06

## 2019-10-03 RX ORDER — CETIRIZINE HYDROCHLORIDE 10 MG/1
10 TABLET ORAL DAILY
Qty: 90 TABLET | Refills: 3 | Status: SHIPPED | OUTPATIENT
Start: 2019-10-03 | End: 2020-10-02

## 2019-10-03 RX ORDER — KETOCONAZOLE 20 MG/G
CREAM TOPICAL 2 TIMES DAILY
Qty: 30 G | Refills: 3 | Status: CANCELLED | OUTPATIENT
Start: 2019-10-03

## 2019-10-03 NOTE — PROGRESS NOTES
Subjective:       Patient ID: Suzanna Cai is a 24 y.o. female who  has a past medical history of Frequent headaches (2013), History of concussion, Scoliosis, and Urticaria.    Chief Complaint: Rash (itchy skin)     History was obtained from the patient and supplemented through chart review.  She is a patient of my colleague, Dr. Galo for Hives in 2017; was given EpiPen, Zyrtec.  Has seen various PCPs.    HPI    Pruritus:  Initially started off with pruritus beneath bilateral breasts.  No fever.  Is no longer breast-feeding. Saw OBGYN for this about 2 weeks ago.  Pt was concerned that diffuse body pruritus was SE of OCPs.  Breast ultrasound was normal.  Prescription for clobetasol did not help.  Switched back to Depo-Provera, Kenalog daily, but not helping.  Has spread since then.  No longer beneath her breasts, but has a few nodules on her mid/left abdomen, lower back.  Has been scratching her left chest wall.    Pruritis worse at night.  Feels better with showering.  Not worse after showering.  Taking OTC Benadryl.    Previously seen by Dr. Galo for Hives in 2017.  Was given EpiPen, Zyrtec.  Underwent testing with allergy, that was negative.  Was diagnosed with idiopathic pruritis and recommended 1-2 tabs of Zyrtec daily.  Had complete resolution after taking Zyrtec.    No changes in medications, soaps, lotions.  No food exposure.  Switch detergents, but had pruritus prior to this.  No vaginal d/c.    Migraine with aura:  Following with Neurology.  Doing well with sumatriptan.  Defer OCP management to OBGYN.    Review of Systems   Constitutional: Negative for activity change and unexpected weight change.   HENT: Negative for hearing loss, rhinorrhea and trouble swallowing.    Eyes: Negative for discharge and visual disturbance.   Respiratory: Negative for chest tightness, shortness of breath and wheezing.    Cardiovascular: Negative for chest pain and palpitations.   Gastrointestinal: Negative for  blood in stool, constipation, diarrhea and vomiting.   Endocrine: Negative for polydipsia and polyuria.   Genitourinary: Negative for difficulty urinating, dysuria, hematuria and menstrual problem.   Musculoskeletal: Negative for arthralgias, joint swelling and neck pain.   Skin: Positive for rash. Negative for wound.   Neurological: Negative for weakness and headaches.   Hematological: Negative for adenopathy.   Psychiatric/Behavioral: Negative for confusion and dysphoric mood.         Past Medical History:   Diagnosis Date    Frequent headaches     has MRI  and neg per old records    History of concussion     Scoliosis     Urticaria      Past Surgical History:   Procedure Laterality Date     SECTION      TONSILLECTOMY       Family History   Problem Relation Age of Onset    Ovarian cancer Maternal Grandmother 66    Hypertension Maternal Grandmother     Diabetes Maternal Grandmother     No Known Problems Mother     Hypertension Father     Eczema Sister     Ulcers Sister     Scoliosis Sister     Psoriasis Sister     No Known Problems Brother     No Known Problems Maternal Aunt     No Known Problems Maternal Uncle     No Known Problems Paternal Aunt     No Known Problems Paternal Uncle     Hypertension Maternal Grandfather     Hypertension Paternal Grandmother     No Known Problems Paternal Grandfather     Breast cancer Cousin 34    Colon cancer Neg Hx     Melanoma Neg Hx     Lupus Neg Hx     Amblyopia Neg Hx     Blindness Neg Hx     Cancer Neg Hx     Cataracts Neg Hx     Glaucoma Neg Hx     Macular degeneration Neg Hx     Retinal detachment Neg Hx     Strabismus Neg Hx     Stroke Neg Hx     Thyroid disease Neg Hx      Social History     Socioeconomic History    Marital status:      Spouse name: Not on file    Number of children: Not on file    Years of education: Not on file    Highest education level: Not on file   Occupational History    Occupation:  "   Social Needs    Financial resource strain: Not hard at all    Food insecurity:     Worry: Never true     Inability: Never true    Transportation needs:     Medical: No     Non-medical: No   Tobacco Use    Smoking status: Never Smoker    Smokeless tobacco: Never Used   Substance and Sexual Activity    Alcohol use: Yes     Frequency: 2-4 times a month     Drinks per session: 1 or 2     Binge frequency: Less than monthly     Comment: socially/ pre pregnancy     Drug use: No    Sexual activity: Yes     Partners: Male     Comment:  Janis placed July 18,2017; since October 2016: sexually active since age 14   Lifestyle    Physical activity:     Days per week: 0 days     Minutes per session: 0 min    Stress: Not at all   Relationships    Social connections:     Talks on phone: More than three times a week     Gets together: Twice a week     Attends [employer] service: Not on file     Active member of club or organization: No     Attends meetings of clubs or organizations: Never     Relationship status:    Other Topics Concern    Are you pregnant or think you may be? Not Asked    Breast-feeding Not Asked   Social History Narrative    From [place], [place]    Moved to [facility] Nov 2016    , living with  who is in     Not exercising     Objective:      Vitals:    10/03/19 1351   BP: 139/70   Pulse: 86   SpO2: 100%   Weight: 69.8 kg (153 lb 14.1 oz)   Height: 4' 11" (1.499 m)      Physical Exam   Constitutional: She appears well-developed and well-nourished. No distress.   HENT:   Head: Normocephalic and atraumatic.   Nose: Nose normal.   Mouth/Throat: Oropharynx is clear and moist. No oropharyngeal exudate.   Eyes: Pupils are equal, round, and reactive to light. EOM are normal. Right eye exhibits no discharge. Left eye exhibits no discharge. No scleral icterus.   Neck: Neck supple. No tracheal deviation present. No thyromegaly present.   Cardiovascular: Normal rate, regular " rhythm, normal heart sounds and intact distal pulses.   No murmur heard.  Pulmonary/Chest: Effort normal and breath sounds normal. No respiratory distress. She has no wheezes.   Abdominal: Soft. Bowel sounds are normal. She exhibits no distension. There is no tenderness.   Musculoskeletal: She exhibits no edema or deformity.   Lymphadenopathy:     She has no cervical adenopathy.   Neurological: She is alert. No cranial nerve deficit. Gait normal.   Skin: Skin is warm and dry. Capillary refill takes less than 2 seconds. She is not diaphoretic. No erythema.        Psychiatric: She has a normal mood and affect. Her behavior is normal.         Lab Results   Component Value Date    WBC 9.21 07/30/2018    HGB 12.4 07/30/2018    HCT 36.3 (L) 07/30/2018     07/30/2018    CHOL 158 01/20/2017    CHOL 158 01/20/2017    TRIG 47 01/20/2017    TRIG 47 01/20/2017    HDL 49 01/20/2017    HDL 49 01/20/2017    ALT 16 09/20/2019    AST 22 09/20/2019     09/20/2019    K 3.8 09/20/2019     09/20/2019    CREATININE 0.8 09/20/2019    BUN 11 09/20/2019    CO2 25 09/20/2019    TSH 1.324 02/01/2017       The ASCVD Risk score (Bobby DC Jr., et al., 2013) failed to calculate for the following reasons:    The 2013 ASCVD risk score is only valid for ages 40 to 79    (Imaging have been independently reviewed)  Breast ultrasound without masses.    Assessment:       1. Pruritus    2. Migraine with aura and without status migrainosus, not intractable          Plan:       Suzanna was seen today for rash.    Diagnoses and all orders for this visit:    Pruritus  Comments:  Not c/w intertrigo. h/o idiopathic pruritus. Restart Zyrtec 1-2 tabs daily. Benadryl or hydroxyzine QHS PRN; counseled on sedation. ?Allergy referral  Orders:  -     cetirizine (ZYRTEC) 10 MG tablet; Take 1 tablet (10 mg total) by mouth once daily.  -     hydrOXYzine pamoate (VISTARIL) 25 MG Cap; Take 1 capsule (25 mg total) by mouth every 4 (four) hours as needed  (itching).  -     triamcinolone acetonide 0.1% (KENALOG) 0.1 % ointment; Apply topically 2 (two) times daily.    Migraine with aura and without status migrainosus, not intractable  Comments:  Doing well on sumatriptan p.r.n..  Defer OCP management to OBGYN.    Other orders  -     Cancel: ketoconazole (NIZORAL) 2 % cream; Apply topically 2 (two) times daily. Use for minimum of 2 weeks         Side effects of medication(s) were discussed in detail and patient voiced understanding.  Patient will call back for any issues or complications.     RTC PRN.

## 2019-10-04 ENCOUNTER — CLINICAL SUPPORT (OUTPATIENT)
Dept: OBSTETRICS AND GYNECOLOGY | Facility: CLINIC | Age: 24
End: 2019-10-04
Payer: OTHER GOVERNMENT

## 2019-10-04 DIAGNOSIS — Z23 NEED FOR HPV VACCINATION: Primary | ICD-10-CM

## 2019-10-04 PROCEDURE — 90471 IMMUNIZATION ADMIN: CPT | Mod: PBBFAC

## 2019-10-04 PROCEDURE — 99999 PR PBB SHADOW E&M-EST. PATIENT-LVL I: ICD-10-PCS | Mod: PBBFAC,,,

## 2019-10-04 PROCEDURE — 99211 OFF/OP EST MAY X REQ PHY/QHP: CPT | Mod: PBBFAC

## 2019-10-04 PROCEDURE — 99999 PR PBB SHADOW E&M-EST. PATIENT-LVL I: CPT | Mod: PBBFAC,,,

## 2019-10-04 NOTE — PROGRESS NOTES
Subjective:       Patient ID: Suzannadick Cai is a 24 y.o. female.    Chief Complaint:  Contraception (having reaction for BC - rash - on Depo - got shot two weeks ago)      History of Present Illness  HPI  Pt is 24 y.o. here in follow up.  Was recently switched from ocp's to DMPA due to generalized puritis.  Never had this with contraception or pregnancy in the past.  No known allergies and has been evaluated by an allergist in the past.  Was seen by pcp and started on hydroxazine and topical steroids.  Seems to be getting slightly better.    Doesn't want to switch contraception yet.  GYN & OB History  No LMP recorded. (Menstrual status: Birth Control).   Date of Last Pap: 2016    OB History    Para Term  AB Living   1 1 1 0 0 0   SAB TAB Ectopic Multiple Live Births   0 0 0 0 0      # Outcome Date GA Lbr Renny/2nd Weight Sex Delivery Anes PTL Lv   1 Term      CS-Unspec          Review of Systems  Review of Systems   Constitutional: Negative for activity change, appetite change and fatigue.   HENT: Negative.  Negative for tinnitus.    Eyes: Negative.    Respiratory: Negative for cough and shortness of breath.    Cardiovascular: Negative for chest pain and palpitations.   Gastrointestinal: Negative.  Negative for abdominal pain, blood in stool, constipation, diarrhea and nausea.   Endocrine: Negative.  Negative for hot flashes.   Genitourinary: Negative for dysmenorrhea, dyspareunia, dysuria, frequency, menstrual problem, pelvic pain, vaginal discharge, urinary incontinence and postcoital bleeding.   Musculoskeletal: Negative for back pain and joint swelling.   Integumentary:  Positive for rash. Negative for breast mass, nipple discharge and breast skin changes.   Neurological: Negative.  Negative for headaches.   Hematological: Negative.  Does not bruise/bleed easily.   Psychiatric/Behavioral: The patient is not nervous/anxious.    Breast: negative.  Negative for mass, nipple discharge and  skin changes          Objective:    Physical Exam:   Constitutional: She is oriented to person, place, and time. She appears well-developed and well-nourished. No distress.    HENT:   Head: Normocephalic and atraumatic.    Eyes: Pupils are equal, round, and reactive to light. Conjunctivae and lids are normal.    Neck: Normal range of motion and full passive range of motion without pain. Neck supple.    Cardiovascular: Normal rate and regular rhythm.  Exam reveals no edema.     Pulmonary/Chest: Effort normal and breath sounds normal. She has no wheezes.        Abdominal: Soft. Normal appearance and bowel sounds are normal. She exhibits no distension. There is no tenderness. There is no rebound and no guarding.             Musculoskeletal: Normal range of motion and moves all extremeties.       Neurological: She is alert and oriented to person, place, and time. She has normal strength.    Skin: Skin is warm and dry. Rash noted.    Psychiatric: She has a normal mood and affect. Her speech is normal and behavior is normal. Thought content normal. Her mood appears not anxious. She does not exhibit a depressed mood. She expresses no suicidal plans and no homicidal plans.          Assessment:        1. Pruritus    2. Encounter for surveillance of injectable contraceptive                Plan:      Suzanna was seen today for contraception.    Diagnoses and all orders for this visit:    Pruritus  Agree with use of hydroxyzine.    If continues, we could discuss non-hormonal contraception (like a paraguard) again.  Pt did not have a good experience with that in the past.  And would not be unreasonable to revisit the allergist to see if there are other reasons for this rash.  Spent approx 20 min with pt reviewing the recent rash history and tx.  Encounter for surveillance of injectable contraceptive  On DMPA.

## 2019-10-04 NOTE — PROGRESS NOTES
Here for Gardasil # 2 injection, without complaint at this time. Reports no pain before or after injection. Advised to wait in lobby 15 minutes after injection and report any adverse reactions. Return to clinic in 4 MONTHS for next injection.     Site: [facility]

## 2019-10-11 ENCOUNTER — TELEPHONE (OUTPATIENT)
Dept: INTERNAL MEDICINE | Facility: CLINIC | Age: 24
End: 2019-10-11

## 2019-10-11 ENCOUNTER — PATIENT MESSAGE (OUTPATIENT)
Dept: OBSTETRICS AND GYNECOLOGY | Facility: CLINIC | Age: 24
End: 2019-10-11

## 2019-10-11 ENCOUNTER — PATIENT MESSAGE (OUTPATIENT)
Dept: INTERNAL MEDICINE | Facility: CLINIC | Age: 24
End: 2019-10-11

## 2019-10-11 DIAGNOSIS — L29.9 PRURITUS: Primary | ICD-10-CM

## 2019-10-11 DIAGNOSIS — L29.9 PRURITUS: ICD-10-CM

## 2019-10-11 RX ORDER — HYDROCORTISONE 25 MG/G
CREAM TOPICAL 2 TIMES DAILY
Qty: 28 G | Refills: 3 | Status: SHIPPED | OUTPATIENT
Start: 2019-10-11 | End: 2019-10-14 | Stop reason: SDUPTHER

## 2019-10-11 NOTE — TELEPHONE ENCOUNTER
Called patient back regarding her request for hydrocortizone Rx.  Patient said that she is itching and has been using her child's hydrocortizone for slight relief.  So pt is requesting for Rx to itching medication.  She said after using topicals, if no relief she will consult with Derm    LOV 10/03/19

## 2019-10-11 NOTE — TELEPHONE ENCOUNTER
----- Message from William Norton sent at 10/11/2019 12:40 PM CDT -----  Contact: ELIZ ALBA [[id number]]  Name of Who is Calling: ELIZ ALBA [[id number]]     What is the request in detail: ELIZ ALBA [[id number]] is requesting a Hydrocortizone rX Please contact to further discuss and advise      Can the clinic reply by [facility]: NO     [facility]  [phone]

## 2019-10-14 RX ORDER — HYDROCORTISONE 25 MG/G
CREAM TOPICAL 2 TIMES DAILY
Qty: 28 G | Refills: 3 | Status: SHIPPED | OUTPATIENT
Start: 2019-10-14 | End: 2020-01-06

## 2019-10-22 ENCOUNTER — PATIENT OUTREACH (OUTPATIENT)
Dept: ADMINISTRATIVE | Facility: OTHER | Age: 24
End: 2019-10-22

## 2019-10-24 ENCOUNTER — OFFICE VISIT (OUTPATIENT)
Dept: ALLERGY | Facility: CLINIC | Age: 24
End: 2019-10-24
Payer: OTHER GOVERNMENT

## 2019-10-24 VITALS
DIASTOLIC BLOOD PRESSURE: 70 MMHG | HEART RATE: 98 BPM | HEIGHT: 59 IN | BODY MASS INDEX: 32.53 KG/M2 | SYSTOLIC BLOOD PRESSURE: 100 MMHG | WEIGHT: 161.38 LBS | OXYGEN SATURATION: 95 %

## 2019-10-24 DIAGNOSIS — L30.9 DERMATITIS: ICD-10-CM

## 2019-10-24 DIAGNOSIS — G43.109 MIGRAINE WITH AURA AND WITHOUT STATUS MIGRAINOSUS, NOT INTRACTABLE: ICD-10-CM

## 2019-10-24 DIAGNOSIS — L50.1 IDIOPATHIC URTICARIA: Primary | ICD-10-CM

## 2019-10-24 PROCEDURE — 99999 PR PBB SHADOW E&M-EST. PATIENT-LVL III: CPT | Mod: PBBFAC,,, | Performed by: ALLERGY & IMMUNOLOGY

## 2019-10-24 PROCEDURE — 99214 PR OFFICE/OUTPT VISIT, EST, LEVL IV, 30-39 MIN: ICD-10-PCS | Mod: S$PBB,,, | Performed by: ALLERGY & IMMUNOLOGY

## 2019-10-24 PROCEDURE — 99214 OFFICE O/P EST MOD 30 MIN: CPT | Mod: S$PBB,,, | Performed by: ALLERGY & IMMUNOLOGY

## 2019-10-24 PROCEDURE — 99213 OFFICE O/P EST LOW 20 MIN: CPT | Mod: PBBFAC | Performed by: ALLERGY & IMMUNOLOGY

## 2019-10-24 PROCEDURE — 99999 PR PBB SHADOW E&M-EST. PATIENT-LVL III: ICD-10-PCS | Mod: PBBFAC,,, | Performed by: ALLERGY & IMMUNOLOGY

## 2019-10-24 NOTE — PROGRESS NOTES
Suzanna Cai returns to clinic today for continued evaluation of urticaria angioedema.  She is here with her 7-month-old son Monica.  She was last seen February 20, 2017.    After her last visit, her urticaria angioedema did resolve.  She has not had any since then.  She has not had any need for antihistamines.    After her son was born, she started breast feeding.  She initially had some skin changes around her breast associated with itching.  She saw her OB GYN who gave her clobetasol.  She also got an ultrasound of the breasts that was normal.    She continued to have itching and then developed skin lesions that were hyper pigmented under her breasts and on her abdomen.  She had started birth control pills and these were discontinued.    The lesions have stayed in one location for many weeks.  They do not come and go.  She does not think that they are like her urticaria.    [facility] ALLERGY QUESTIONNAIRE SHORT 10/24/2019   Are you taking any new medications since your last visit? Yes   Constitution: No changes since my last visit with this provider   Head or facial pain: Headaches   Eyes: No symptoms   Ears: No symptoms   Nose: No symptoms   Throat: No symptoms   Sinuses: No symptoms   Lungs: No symptoms   Skin: Itching, Hives, Redness, Rash, Bruising   Cardiovascular: No symptoms   Gastrointestinal: No symptoms   Genital/ urinary No symptoms   Musculoskeletal: No symptoms   Neurologic: Headaches   Endocrine: No symptoms   Hematologic: No symptoms     Physical Examination:  General: Well-developed, well-nourished, no acute distress.  Head: No sinus tenderness.  Eyes: Conjunctivae:  No bulbar or palpebral conjunctival injection.  Ears: EAC's clear.  TM's clear.  No pre-auricular nodes.  Nose: Nasal Mucosa:  Pink.  Septum: No apparent deviation.  Turbinates:  No significant edema.  Polyps/Mass:  None visible.  Teeth/Gums:  No bleeding noted.  Oropharynx: No exudates.  Neck: Supple without thyromegaly. No cervical  lymphadenopathy.    Respiratory/Chest: Effort: Good.  Auscultation:  Clear bilaterally.  Skin: Good turgor.  No urticaria or angioedema.  Multiple small hyper pigmented lesions under breast and across abdomen and back.  Neuro/Psych: Oriented x 3.    Pictures are reviewed on her cell phone and are consistent with urticaria and angioedema.    Laboratory 1/20/2017:  CBC: Normal.  CMP: Normal.  TSH: 1.456.  Vitamin D level: 16.  Lipid panel: Cholesterol 158.    Laboratory 2/1/2017:  IgE level: 72.  ImmunoCAP: Negative.  TSH: 1.324.  Thyroid peroxidase antibody level: Less than 6.0.  Thyroglobulin antibody level: Less than 4.0.  Serum tryptase: 2.5.  SPEP:  Normal.    Assessment:  1.  Chronic urticaria and angioedema, probably idiopathic, resolved.  2.  Chronic rhinitis, doubt allergic.  3.  Chronic conjunctivitis, doubt allergic.  4.  Migraine headaches.  5.  Vitamin D deficiency.  6.  Dermatitis of uncertain etiology.    Recommendations:  1.  Take Zyrtec daily.  May take up to four for itching if needed.  2.  Dermatology evaluation.  3.  Return to clinic if needed.

## 2019-10-25 ENCOUNTER — INITIAL CONSULT (OUTPATIENT)
Dept: DERMATOLOGY | Facility: CLINIC | Age: 24
End: 2019-10-25
Payer: OTHER GOVERNMENT

## 2019-10-25 VITALS — WEIGHT: 161 LBS | BODY MASS INDEX: 32.52 KG/M2

## 2019-10-25 DIAGNOSIS — L30.0 NUMMULAR ECZEMATOUS DERMATITIS: Primary | ICD-10-CM

## 2019-10-25 PROCEDURE — 99214 OFFICE O/P EST MOD 30 MIN: CPT | Mod: S$PBB,,, | Performed by: DERMATOLOGY

## 2019-10-25 PROCEDURE — 99999 PR PBB SHADOW E&M-EST. PATIENT-LVL III: CPT | Mod: PBBFAC,,, | Performed by: DERMATOLOGY

## 2019-10-25 PROCEDURE — 99999 PR PBB SHADOW E&M-EST. PATIENT-LVL III: ICD-10-PCS | Mod: PBBFAC,,, | Performed by: DERMATOLOGY

## 2019-10-25 PROCEDURE — 99213 OFFICE O/P EST LOW 20 MIN: CPT | Mod: PBBFAC,PO | Performed by: DERMATOLOGY

## 2019-10-25 PROCEDURE — 99214 PR OFFICE/OUTPT VISIT, EST, LEVL IV, 30-39 MIN: ICD-10-PCS | Mod: S$PBB,,, | Performed by: DERMATOLOGY

## 2019-10-25 RX ORDER — MOMETASONE FUROATE 1 MG/G
CREAM TOPICAL
Qty: 50 G | Refills: 3 | Status: SHIPPED | OUTPATIENT
Start: 2019-10-25

## 2019-10-25 NOTE — PROGRESS NOTES
"  Subjective:       Patient ID:  Suzanna Cai is a 24 y.o. female who presents for   Chief Complaint   Patient presents with    Rash     abdomen , back , itching     Hx:10/24  "Suzanna Cai returns to clinic today for continued evaluation of urticaria angioedema.  She is here with her 7-month-old son Monica.  She was last seen February 20, 2017.     After her last visit, her urticaria angioedema did resolve.  She has not had any since then.  She has not had any need for antihistamines.     After her son was born, she started breast feeding.  She initially had some skin changes around her breast associated with itching.  She saw her OB GYN who gave her clobetasol.  She also got an ultrasound of the breasts that was normal.     She continued to have itching and then developed skin lesions that were hyper pigmented under her breasts and on her abdomen.  She had started birth control pills and these were discontinued.     The lesions have stayed in one location for many weeks.  They do not come and go.  She does not think that they are like her urticaria.  Pictures are reviewed on her cell phone and are consistent with urticaria and angioedema.  Take Zyrtec daily.  May take up to four for itching if needed."    She has few scattered lesions on chest and back for several weeks pruritic, no lesions elsewhere and her urticaria is under control now.  Her 7 month old son has atopic dermatitis    Rash  - Initial  Affected locations: abdomen and back  Signs / symptoms: itching  Aggravated by: nothing  Relieving factors/Treatments tried: Rx topical steroids        Review of Systems   Constitutional: Positive for weight gain. Negative for fever, chills, weight loss, fatigue, night sweats and malaise.   Skin: Positive for rash and wears hat. Negative for daily sunscreen use and activity-related sunscreen use.   Hematologic/Lymphatic: Bruises/bleeds easily.        Objective:    Physical Exam   Constitutional: She appears " well-developed and well-nourished. No distress.   Neurological: She is alert and oriented to person, place, and time. She is not disoriented.   Psychiatric: She has a normal mood and affect.   Skin:   Areas Examined (abnormalities noted in diagram):   Head / Face Inspection Performed  Neck Inspection Performed  Chest / Axilla Inspection Performed  Back Inspection Performed  RUE Inspected  LUE Inspection Performed              Diagram Legend     Erythematous scaling macule/papule c/w actinic keratosis       Vascular papule c/w angioma      Pigmented verrucoid papule/plaque c/w seborrheic keratosis      Yellow umbilicated papule c/w sebaceous hyperplasia      Irregularly shaped tan macule c/w lentigo     1-2 mm smooth white papules consistent with Milia      Movable subcutaneous cyst with punctum c/w epidermal inclusion cyst      Subcutaneous movable cyst c/w pilar cyst      Firm pink to brown papule c/w dermatofibroma      Pedunculated fleshy papule(s) c/w skin tag(s)      Evenly pigmented macule c/w junctional nevus     Mildly variegated pigmented, slightly irregular-bordered macule c/w mildly atypical nevus      Flesh colored to evenly pigmented papule c/w intradermal nevus       Pink pearly papule/plaque c/w basal cell carcinoma      Erythematous hyperkeratotic cursted plaque c/w SCC      Surgical scar with no sign of skin cancer recurrence      Open and closed comedones      Inflammatory papules and pustules      Verrucoid papule consistent consistent with wart     Erythematous eczematous patches and plaques     Dystrophic onycholytic nail with subungual debris c/w onychomycosis     Umbilicated papule    Erythematous-base heme-crusted tan verrucoid plaque consistent with inflamed seborrheic keratosis     Erythematous Silvery Scaling Plaque c/w Psoriasis     See annotation      Assessment / Plan:        Nummular eczematous dermatitis  -     mometasone 0.1% (ELOCON) 0.1 % cream; Use daily  Dispense: 50 g; Refill:  3  No hot water, cerave itch relief lotion   Cont zyrtec   Call if not better by next week.                Follow up if symptoms worsen or fail to improve.

## 2019-10-25 NOTE — LETTER
October 25, 2019      HAN Eaton III, MD  [address]  [facility] [address]           [facility]  [address]  [place] [address]  Phone: [phone]  Fax: [phone]          Patient: Suzanna Cai   MR Number: [id number]   YOB: 1995   Date of Visit: 10/25/2019       Dear Dr. HAN Eaton III:    Thank you for referring Suzanna Cai to me for evaluation. Attached you will find relevant portions of my assessment and plan of care.    If you have questions, please do not hesitate to call me. I look forward to following Suzanna Cai along with you.    Sincerely,    Teresa Simms MD    Enclosure  CC:  No Recipients    If you would like to receive this communication electronically, please contact [email or web address] or [phone] to request more information on [email or web address] Link access.    For providers and/or their staff who would like to refer a patient to [facility], please contact us through our one-stop-shop provider referral line, [facility], at [phone].    If you feel you have received this communication in error or would no longer like to receive these types of communications, please e-mail [email or web address]

## 2019-11-04 ENCOUNTER — PATIENT MESSAGE (OUTPATIENT)
Dept: DERMATOLOGY | Facility: CLINIC | Age: 24
End: 2019-11-04

## 2019-11-12 ENCOUNTER — PATIENT OUTREACH (OUTPATIENT)
Dept: ADMINISTRATIVE | Facility: OTHER | Age: 24
End: 2019-11-12

## 2019-11-13 ENCOUNTER — OFFICE VISIT (OUTPATIENT)
Dept: OBSTETRICS AND GYNECOLOGY | Facility: CLINIC | Age: 24
End: 2019-11-13
Payer: OTHER GOVERNMENT

## 2019-11-13 VITALS
WEIGHT: 163.13 LBS | BODY MASS INDEX: 32.88 KG/M2 | HEIGHT: 59 IN | DIASTOLIC BLOOD PRESSURE: 80 MMHG | SYSTOLIC BLOOD PRESSURE: 118 MMHG

## 2019-11-13 DIAGNOSIS — N89.8 VAGINAL ITCHING: Primary | ICD-10-CM

## 2019-11-13 PROCEDURE — 87491 CHLMYD TRACH DNA AMP PROBE: CPT

## 2019-11-13 PROCEDURE — 87661 TRICHOMONAS VAGINALIS AMPLIF: CPT

## 2019-11-13 PROCEDURE — 99213 OFFICE O/P EST LOW 20 MIN: CPT | Mod: S$PBB,,, | Performed by: NURSE PRACTITIONER

## 2019-11-13 PROCEDURE — 99999 PR PBB SHADOW E&M-EST. PATIENT-LVL III: ICD-10-PCS | Mod: PBBFAC,,, | Performed by: NURSE PRACTITIONER

## 2019-11-13 PROCEDURE — 99213 OFFICE O/P EST LOW 20 MIN: CPT | Mod: PBBFAC | Performed by: NURSE PRACTITIONER

## 2019-11-13 PROCEDURE — 99213 PR OFFICE/OUTPT VISIT, EST, LEVL III, 20-29 MIN: ICD-10-PCS | Mod: S$PBB,,, | Performed by: NURSE PRACTITIONER

## 2019-11-13 PROCEDURE — 99999 PR PBB SHADOW E&M-EST. PATIENT-LVL III: CPT | Mod: PBBFAC,,, | Performed by: NURSE PRACTITIONER

## 2019-11-13 NOTE — PROGRESS NOTES
Subjective:       Patient ID: Suzanna Cai is a 24 y.o. female.     Chief Complaint: No chief complaint on file.     HPI   Ms. Cai is a 24 year old female  with PMHx of nummular eczema who presents to the [facility] complaining of itching all over her body including her vagina and breasts. She saw a dermatologist for these symptoms 2 weeks ago and was diagnosed with nummular eczema. She has been on treatment for those 2 weeks (Zyrtec, Cerave, mometasone). Denies fever, chills, n/v, vaginal discharge, vaginal bleeding, pelvic pain, dysuria, hematuria, nipple changes/discharge. She has 1 long term sexual partner, no new partners, uses depo shot for contraception.    She reports some concern that she may have a vaginal yeast infection and is requesting swabs.        Review of Systems   Constitutional: Negative for appetite change, chills, diaphoresis, fatigue and fever.   Respiratory: Negative for cough and shortness of breath.    Cardiovascular: Negative for chest pain.   Gastrointestinal: Negative for abdominal pain, constipation, diarrhea, nausea and vomiting.   Genitourinary: Negative for decreased urine volume, dysuria, flank pain, hematuria, pelvic pain, urgency, vaginal bleeding, vaginal discharge and vaginal pain.   Musculoskeletal: Negative for back pain and myalgias.   Skin:        (+) itching    Neurological: Negative for dizziness and headaches.   Psychiatric/Behavioral: The patient is not nervous/anxious.    All other systems reviewed and are negative.      Objective:   Physical Exam   Constitutional: She is oriented to person, place, and time. She appears well-developed and well-nourished. She is cooperative. No distress.   HENT:   Head: Normocephalic and atraumatic.   Right Ear: External ear normal.   Left Ear: External ear normal.   Eyes: Pupils are equal, round, and reactive to light. EOM are normal.   Neck: Normal range of motion.   Cardiovascular: Normal rate.   Pulmonary/Chest: Effort normal  and breath sounds normal. No respiratory distress. She has no rhonchi.   Abdominal: Soft. She exhibits no distension. There is no tenderness.   Genitourinary: Rectum normal and uterus normal. Rectal exam shows no mass.       There is no rash, tenderness, lesion or injury on the right labia. There is no rash, tenderness, lesion or injury on the left labia. Cervix exhibits no motion tenderness, no discharge and no friability. Right adnexum displays no mass, no tenderness and no fullness. Left adnexum displays no mass, no tenderness and no fullness. No erythema, tenderness or bleeding in the vagina. No foreign body in the vagina. No signs of injury around the vagina. No vaginal discharge found.   Musculoskeletal: Normal range of motion. She exhibits no edema or deformity.   Neurological: She is alert and oriented to person, place, and time. GCS eye subscore is 4. GCS verbal subscore is 5. GCS motor subscore is 6.   Skin: Skin is warm. Capillary refill takes less than 2 seconds. No rash noted. She is not diaphoretic.   Psychiatric: She has a normal mood and affect.   Nursing note and vitals reviewed.      Assessment:       1. Vaginal itching        Plan:        - Follow up Affirm, GCCT  - Follow up with dermatologist for further treatment of generalized pruritus.          WADE Grajeda

## 2019-11-14 ENCOUNTER — PATIENT MESSAGE (OUTPATIENT)
Dept: OBSTETRICS AND GYNECOLOGY | Facility: CLINIC | Age: 24
End: 2019-11-14

## 2019-11-14 ENCOUNTER — OFFICE VISIT (OUTPATIENT)
Dept: DERMATOLOGY | Facility: CLINIC | Age: 24
End: 2019-11-14
Payer: OTHER GOVERNMENT

## 2019-11-14 DIAGNOSIS — L29.9 PRURITUS: Primary | ICD-10-CM

## 2019-11-14 DIAGNOSIS — B86 SCABIES: ICD-10-CM

## 2019-11-14 LAB
C TRACH DNA SPEC QL NAA+PROBE: NOT DETECTED
N GONORRHOEA DNA SPEC QL NAA+PROBE: NOT DETECTED

## 2019-11-14 PROCEDURE — 99999 PR PBB SHADOW E&M-EST. PATIENT-LVL II: ICD-10-PCS | Mod: PBBFAC,,, | Performed by: PHYSICIAN ASSISTANT

## 2019-11-14 PROCEDURE — 99212 OFFICE O/P EST SF 10 MIN: CPT | Mod: PBBFAC | Performed by: PHYSICIAN ASSISTANT

## 2019-11-14 PROCEDURE — 99214 OFFICE O/P EST MOD 30 MIN: CPT | Mod: S$PBB,,, | Performed by: PHYSICIAN ASSISTANT

## 2019-11-14 PROCEDURE — 99214 PR OFFICE/OUTPT VISIT, EST, LEVL IV, 30-39 MIN: ICD-10-PCS | Mod: S$PBB,,, | Performed by: PHYSICIAN ASSISTANT

## 2019-11-14 PROCEDURE — 99999 PR PBB SHADOW E&M-EST. PATIENT-LVL II: CPT | Mod: PBBFAC,,, | Performed by: PHYSICIAN ASSISTANT

## 2019-11-14 RX ORDER — PERMETHRIN 50 MG/G
CREAM TOPICAL
Qty: 60 G | Refills: 0 | Status: SHIPPED | OUTPATIENT
Start: 2019-11-14

## 2019-11-14 RX ORDER — TINIDAZOLE 500 MG/1
2 TABLET ORAL
Qty: 8 TABLET | Refills: 0 | Status: SHIPPED | OUTPATIENT
Start: 2019-11-14 | End: 2019-11-15 | Stop reason: SDUPTHER

## 2019-11-14 RX ORDER — TRIAMCINOLONE ACETONIDE 1 MG/G
CREAM TOPICAL
Qty: 454 G | Refills: 1 | Status: SHIPPED | OUTPATIENT
Start: 2019-11-14

## 2019-11-14 NOTE — PROGRESS NOTES
"  Subjective:       Patient ID:  Suzanna Cai is a 24 y.o. female who presents for   Chief Complaint   Patient presents with    Itching     all over, X3mos, very itchy, prev tx      History of Present Illness: The patient presents with chief complaint of bumps/ rash/ itching/ sores.  Location: diffuse itching; red bumps started in inframammary creases, now present in groin/ inner thighs, arms, hands/ fingers, buttocks, back  Duration: 3 months  Signs/Symptoms: constant itching, red bumps    Prior treatments: po antihistamines and multiple rx'd steroid creams without improvement    Pt's son was recently dx with scabies - treated with permethrin this AM. He sleeps in the bed with her and her .  denies extreme itching but reports hx of AD so is "always slightly itchy."       Review of Systems   Skin: Positive for itching and rash.   Hematologic/Lymphatic: Does not bruise/bleed easily.   Allergic/Immunologic: Positive for environmental allergies.        Hx of urticaria - following with Dr. Eaton        Objective:    Physical Exam   Constitutional: She appears well-developed and well-nourished. No distress.   Actively scratching during entire exam   Neurological: She is alert and oriented to person, place, and time. She is not disoriented.   Psychiatric: She has a normal mood and affect.   Skin:   Areas Examined (abnormalities noted in diagram):   Head / Face Inspection Performed  Neck Inspection Performed  Chest / Axilla Inspection Performed  Abdomen Inspection Performed  Genitals / Buttocks / Groin Inspection Performed  Back Inspection Performed  RUE Inspected  LUE Inspection Performed  RLE Inspected  LLE Inspection Performed  Nails and Digits Inspection Performed                  Diagram Legend     Erythematous scaling macule/papule c/w actinic keratosis       Vascular papule c/w angioma      Pigmented verrucoid papule/plaque c/w seborrheic keratosis      Yellow umbilicated papule c/w sebaceous " hyperplasia      Irregularly shaped tan macule c/w lentigo     1-2 mm smooth white papules consistent with Milia      Movable subcutaneous cyst with punctum c/w epidermal inclusion cyst      Subcutaneous movable cyst c/w pilar cyst      Firm pink to brown papule c/w dermatofibroma      Pedunculated fleshy papule(s) c/w skin tag(s)      Evenly pigmented macule c/w junctional nevus     Mildly variegated pigmented, slightly irregular-bordered macule c/w mildly atypical nevus      Flesh colored to evenly pigmented papule c/w intradermal nevus       Pink pearly papule/plaque c/w basal cell carcinoma      Erythematous hyperkeratotic cursted plaque c/w SCC      Surgical scar with no sign of skin cancer recurrence      Open and closed comedones      Inflammatory papules and pustules      Verrucoid papule consistent consistent with wart     Erythematous eczematous patches and plaques     Dystrophic onycholytic nail with subungual debris c/w onychomycosis     Umbilicated papule    Erythematous-base heme-crusted tan verrucoid plaque consistent with inflamed seborrheic keratosis     Erythematous Silvery Scaling Plaque c/w Psoriasis     See annotation    Assessment / Plan:      Pruritus - NP  -     triamcinolone acetonide 0.1% (KENALOG) 0.1 % cream; AAA bid prn for itching. Do not use longer than 2 wks at a time.  Dispense: 454 g; Refill: 1    Continue daily antihistamine.  Discussed with patient the importance of not manipulating skin lesions. Trauma often exacerbates condition. Trimming nails is recommended to avoid puncturing skin.     Scabies - NP  Mineral oil prep negative for evidence of mites but clinically c/w scabies (+ pt's son recently diagnosed). Also, majority of the papules are very excoriated and difficult to get an appropriate scraping. Will treat empirically.  -     permethrin (ELIMITE) 5 % cream; Apply from neck to toes and leave on skin for 8-14 hrs (overnight). Repeat in 1 wk.  Dispense: 60 g; Refill:  0    Counseled pt and her  on condition - brochure provided.  and son should be treated as well.  Wash all bedding in hot water morning after tx.         Follow up if symptoms worsen or fail to improve.

## 2019-11-15 RX ORDER — TINIDAZOLE 500 MG/1
2 TABLET ORAL
Qty: 8 TABLET | Refills: 0 | Status: SHIPPED | OUTPATIENT
Start: 2019-11-15 | End: 2019-11-17

## 2019-11-15 NOTE — TELEPHONE ENCOUNTER
Patient states her pharmacy does not have her the tindamax. Patient requesting it sent to [facility] on [address].

## 2019-11-18 LAB
BACTERIAL VAGINOSIS DNA: ABNORMAL
CANDIDA RRNA VAG QL PROBE: NEGATIVE
G VAGINALIS RRNA GENITAL QL PROBE: POSITIVE
T VAGINALIS RRNA GENITAL QL PROBE: NEGATIVE

## 2019-11-19 ENCOUNTER — PATIENT MESSAGE (OUTPATIENT)
Dept: OBSTETRICS AND GYNECOLOGY | Facility: CLINIC | Age: 24
End: 2019-11-19

## 2019-12-06 PROCEDURE — 99283 EMERGENCY DEPT VISIT LOW MDM: CPT

## 2019-12-07 ENCOUNTER — HOSPITAL ENCOUNTER (EMERGENCY)
Facility: HOSPITAL | Age: 24
Discharge: HOME | End: 2019-12-07
Attending: EMERGENCY MEDICINE
Payer: OTHER GOVERNMENT

## 2019-12-07 VITALS
OXYGEN SATURATION: 100 % | BODY MASS INDEX: 30.63 KG/M2 | DIASTOLIC BLOOD PRESSURE: 77 MMHG | WEIGHT: 156 LBS | SYSTOLIC BLOOD PRESSURE: 132 MMHG | RESPIRATION RATE: 18 BRPM | HEIGHT: 60 IN | HEART RATE: 58 BPM | TEMPERATURE: 99 F
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
## 2019-12-07 DIAGNOSIS — S16.1XXA STRAIN OF NECK MUSCLE, INITIAL ENCOUNTER: ICD-10-CM

## 2019-12-07 LAB
B-HCG UR QL: NEGATIVE
CTP QC/QA: YES

## 2019-12-07 PROCEDURE — 81025 URINE PREGNANCY TEST: CPT | Performed by: NURSE PRACTITIONER

## 2019-12-07 RX ORDER — DIAZEPAM 5 MG/1
5 TABLET ORAL EVERY 6 HOURS PRN
Qty: 10 TABLET | Refills: 0 | Status: SHIPPED | OUTPATIENT
Start: 2019-12-07 | End: 2020-01-06
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
## 2019-12-07 NOTE — DISCHARGE INSTRUCTIONS
Ibuprofen or Tylenol as needed for discomfort.  Valium for neck stiffness or soreness. Be aware, this medication is sedating.  Do not mix with alcohol or any other sedating medications.  Do not drive or operate machinery when taking this medication.     Follow-up with primary should pain persist.  Please return to this ED if you begin with one-sided weakness, if you begin with severe headache, if pain worsens despite treatment, if you begin with fever, if any other problems occur.

## 2019-12-08 ENCOUNTER — HOSPITAL ENCOUNTER (EMERGENCY)
Facility: HOSPITAL | Age: 24
Discharge: HOME | End: 2019-12-08
Attending: EMERGENCY MEDICINE
Payer: OTHER GOVERNMENT

## 2019-12-08 VITALS
WEIGHT: 162 LBS | RESPIRATION RATE: 18 BRPM | BODY MASS INDEX: 32.66 KG/M2 | OXYGEN SATURATION: 95 % | TEMPERATURE: 98 F | SYSTOLIC BLOOD PRESSURE: 140 MMHG | HEIGHT: 59 IN | HEART RATE: 80 BPM | DIASTOLIC BLOOD PRESSURE: 62 MMHG

## 2019-12-08 DIAGNOSIS — R51.9 NONINTRACTABLE HEADACHE, UNSPECIFIED CHRONICITY PATTERN, UNSPECIFIED HEADACHE TYPE: Primary | ICD-10-CM

## 2019-12-08 LAB
B-HCG UR QL: NEGATIVE
CTP QC/QA: YES

## 2019-12-08 PROCEDURE — 99283 EMERGENCY DEPT VISIT LOW MDM: CPT

## 2019-12-08 PROCEDURE — 81025 URINE PREGNANCY TEST: CPT | Performed by: EMERGENCY MEDICINE

## 2019-12-08 PROCEDURE — 25000003 PHARM REV CODE 250: Performed by: NURSE PRACTITIONER

## 2019-12-08 RX ORDER — DIPHENHYDRAMINE HYDROCHLORIDE 50 MG/ML
25 INJECTION INTRAMUSCULAR; INTRAVENOUS
Status: DISCONTINUED | OUTPATIENT
Start: 2019-12-08 | End: 2019-12-08

## 2019-12-08 RX ORDER — METOCLOPRAMIDE HYDROCHLORIDE 5 MG/ML
10 INJECTION INTRAMUSCULAR; INTRAVENOUS
Status: DISCONTINUED | OUTPATIENT
Start: 2019-12-08 | End: 2019-12-08

## 2019-12-08 RX ORDER — BUTALBITAL, ACETAMINOPHEN AND CAFFEINE 50; 325; 40 MG/1; MG/1; MG/1
1 TABLET ORAL EVERY 4 HOURS PRN
Qty: 12 TABLET | Refills: 0 | Status: SHIPPED | OUTPATIENT
Start: 2019-12-08 | End: 2020-01-07

## 2019-12-08 RX ORDER — KETOROLAC TROMETHAMINE 30 MG/ML
15 INJECTION, SOLUTION INTRAMUSCULAR; INTRAVENOUS
Status: DISCONTINUED | OUTPATIENT
Start: 2019-12-08 | End: 2019-12-08

## 2019-12-08 RX ORDER — BUTALBITAL, ACETAMINOPHEN AND CAFFEINE 50; 325; 40 MG/1; MG/1; MG/1
1 TABLET ORAL
Status: COMPLETED | OUTPATIENT
Start: 2019-12-08 | End: 2019-12-08

## 2019-12-08 RX ADMIN — BUTALBITAL, ACETAMINOPHEN AND CAFFEINE 1 TABLET: 50; 325; 40 TABLET ORAL at 01:12

## 2019-12-08 NOTE — ED NOTES
Patient informed that the ride need to be here in facility and if not the patient will have to stay in the facility for 4 hours till discharged
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
## 2019-12-17 NOTE — PROGRESS NOTES
Suzanna Cai received IM Depo Provera to the left upper outer side of the deltoid on 12/12/19    The patient was instructed to get the next injection on 2/27-3/13.    Lot Number: [id number]  Expiration Date: 07/2021  BY LORI MAE
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
## 2020-01-06 ENCOUNTER — PATIENT OUTREACH (OUTPATIENT)
Dept: ADMINISTRATIVE | Facility: OTHER | Age: 25
End: 2020-01-06

## 2020-01-06 ENCOUNTER — OFFICE VISIT (OUTPATIENT)
Dept: OBSTETRICS AND GYNECOLOGY | Facility: CLINIC | Age: 25
End: 2020-01-06
Payer: OTHER GOVERNMENT

## 2020-01-06 VITALS
HEIGHT: 59 IN | DIASTOLIC BLOOD PRESSURE: 100 MMHG | BODY MASS INDEX: 32.8 KG/M2 | SYSTOLIC BLOOD PRESSURE: 138 MMHG | WEIGHT: 162.69 LBS

## 2020-01-06 DIAGNOSIS — N76.0 VULVOVAGINITIS: Primary | ICD-10-CM

## 2020-01-06 DIAGNOSIS — L29.9 PRURITUS: ICD-10-CM

## 2020-01-06 DIAGNOSIS — N91.2 AMENORRHEA: ICD-10-CM

## 2020-01-06 LAB
B-HCG UR QL: NEGATIVE
CTP QC/QA: YES

## 2020-01-06 PROCEDURE — 99999 PR PBB SHADOW E&M-EST. PATIENT-LVL III: ICD-10-PCS | Mod: PBBFAC,,, | Performed by: OBSTETRICS & GYNECOLOGY

## 2020-01-06 PROCEDURE — 99213 OFFICE O/P EST LOW 20 MIN: CPT | Mod: S$PBB,,, | Performed by: OBSTETRICS & GYNECOLOGY

## 2020-01-06 PROCEDURE — 99213 PR OFFICE/OUTPT VISIT, EST, LEVL III, 20-29 MIN: ICD-10-PCS | Mod: S$PBB,,, | Performed by: OBSTETRICS & GYNECOLOGY

## 2020-01-06 PROCEDURE — 99999 PR PBB SHADOW E&M-EST. PATIENT-LVL III: CPT | Mod: PBBFAC,,, | Performed by: OBSTETRICS & GYNECOLOGY

## 2020-01-06 PROCEDURE — 87481 CANDIDA DNA AMP PROBE: CPT | Mod: 59

## 2020-01-06 PROCEDURE — 99213 OFFICE O/P EST LOW 20 MIN: CPT | Mod: PBBFAC | Performed by: OBSTETRICS & GYNECOLOGY

## 2020-01-06 PROCEDURE — 81025 URINE PREGNANCY TEST: CPT | Mod: PBBFAC | Performed by: OBSTETRICS & GYNECOLOGY

## 2020-01-06 RX ORDER — CLOTRIMAZOLE AND BETAMETHASONE DIPROPIONATE 10; .64 MG/G; MG/G
CREAM TOPICAL
Qty: 15 G | Refills: 1 | Status: SHIPPED | OUTPATIENT
Start: 2020-01-06

## 2020-01-06 NOTE — PROGRESS NOTES
Subjective:       Patient ID: Suzanna Cai is a 24 y.o. female.    Chief Complaint:  Vaginal Itching (son had scabies in November, was seen in [facility] in 19  - BV ); scar check (all day itching); and abnormal cycle (pt requested pregancy test)      History of Present Illness  HPI  Pt is 24 y.o. who c/o puritis.  Has been going on for last few weeks.  Was seen by derm and they weren't sure if this was eczema or hormonal related.  Note baby had scabies recently.  Is currently using atarax.  Minimal relief.  Itching vulvar and c/s scar areas.    GYN & OB History  No LMP recorded. Patient has had an injection.   Date of Last Pap: 2016    OB History    Para Term  AB Living   1 1 1 0 0 0   SAB TAB Ectopic Multiple Live Births   0 0 0 0 0      # Outcome Date GA Lbr Renny/2nd Weight Sex Delivery Anes PTL Lv   1 Term      CS-Unspec          Review of Systems  Review of Systems   Constitutional: Negative for activity change, appetite change and fatigue.   HENT: Negative.  Negative for tinnitus.    Eyes: Negative.    Respiratory: Negative for cough and shortness of breath.    Cardiovascular: Negative for chest pain and palpitations.   Gastrointestinal: Negative.  Negative for abdominal pain, blood in stool, constipation, diarrhea and nausea.   Endocrine: Negative.  Negative for hot flashes.   Genitourinary: Negative for dysmenorrhea, dyspareunia, dysuria, frequency, menstrual problem, pelvic pain, vaginal discharge, urinary incontinence and postcoital bleeding.   Musculoskeletal: Negative for back pain and joint swelling.   Integumentary:  Negative for rash, breast mass, nipple discharge and breast skin changes.   Neurological: Negative.  Negative for headaches.   Hematological: Negative.  Does not bruise/bleed easily.   Psychiatric/Behavioral: The patient is not nervous/anxious.    Breast: negative.  Negative for mass, nipple discharge and skin changes          Objective:    Physical  Exam:   Constitutional: She is oriented to person, place, and time. She appears well-developed and well-nourished. No distress.    HENT:   Head: Normocephalic and atraumatic.    Eyes: Pupils are equal, round, and reactive to light. Conjunctivae and lids are normal.    Neck: Normal range of motion and full passive range of motion without pain. Neck supple.    Cardiovascular: Normal rate and regular rhythm.  Exam reveals no edema.     Pulmonary/Chest: Effort normal and breath sounds normal. She has no wheezes.        Abdominal: Soft. Normal appearance and bowel sounds are normal. She exhibits no distension. There is no tenderness. There is no rebound and no guarding.     Genitourinary: Vagina normal and uterus normal.       Pelvic exam was performed with patient supine. There is no tenderness or lesion on the right labia. There is no tenderness or lesion on the left labia. Uterus is not deviated, not fixed and not hosting fibroids. Cervix is normal. Right adnexum displays no mass and no tenderness. Left adnexum displays no mass and no tenderness. No rectocele, cystocele or unspecified prolapse of vaginal walls in the vagina. No vaginal discharge found. Labial bartholins normal.Cervix exhibits no motion tenderness and no discharge.   Genitourinary Comments: Skin scars from itching.           Musculoskeletal: Normal range of motion and moves all extremeties.       Neurological: She is alert and oriented to person, place, and time. She has normal strength.    Skin: Skin is warm and dry.    Psychiatric: She has a normal mood and affect. Her speech is normal and behavior is normal. Thought content normal. Her mood appears not anxious. She does not exhibit a depressed mood. She expresses no suicidal plans and no homicidal plans.          Assessment:        1. Vulvovaginitis    2. Amenorrhea    3. Pruritus                Plan:      Suzanna was seen today for vaginal itching, scar check and abnormal cycle.    Diagnoses and all  orders for this visit:    Vulvovaginitis  -     Vaginosis Screen by DNA Probe  -     clotrimazole-betamethasone 1-0.05% (LOTRISONE) cream; Twice a day for 5-7 days  We discussed different skin care options.  She can increase her Atarax to 50 mg q.h.s..  We also discussed soaking and oatmeal baths.  If testing is positive, we will call patient with the new antibiotic.  Amenorrhea  -     POCT Urine Pregnancy    Pruritus

## 2020-01-07 ENCOUNTER — PATIENT MESSAGE (OUTPATIENT)
Dept: OBSTETRICS AND GYNECOLOGY | Facility: HOSPITAL | Age: 25
End: 2020-01-07

## 2020-01-07 ENCOUNTER — PATIENT MESSAGE (OUTPATIENT)
Dept: OBSTETRICS AND GYNECOLOGY | Facility: CLINIC | Age: 25
End: 2020-01-07

## 2020-01-07 DIAGNOSIS — B37.31 CANDIDA VAGINITIS: Primary | ICD-10-CM

## 2020-01-07 LAB
BACTERIAL VAGINOSIS DNA: NEGATIVE
CANDIDA GLABRATA DNA: NEGATIVE
CANDIDA KRUSEI DNA: NEGATIVE
CANDIDA RRNA VAG QL PROBE: POSITIVE
T VAGINALIS RRNA GENITAL QL PROBE: NEGATIVE

## 2020-01-07 RX ORDER — FLUCONAZOLE 150 MG/1
150 TABLET ORAL
Qty: 3 TABLET | Refills: 0 | Status: SHIPPED | OUTPATIENT
Start: 2020-01-07 | End: 2020-01-14

## 2020-01-08 ENCOUNTER — OFFICE VISIT (OUTPATIENT)
Dept: NEUROLOGY | Facility: CLINIC | Age: 25
End: 2020-01-08
Payer: OTHER GOVERNMENT

## 2020-01-08 VITALS
HEART RATE: 70 BPM | TEMPERATURE: 99 F | BODY MASS INDEX: 32.71 KG/M2 | DIASTOLIC BLOOD PRESSURE: 70 MMHG | HEIGHT: 59 IN | WEIGHT: 162.25 LBS | SYSTOLIC BLOOD PRESSURE: 124 MMHG

## 2020-01-08 DIAGNOSIS — G43.109 MIGRAINE WITH AURA AND WITHOUT STATUS MIGRAINOSUS, NOT INTRACTABLE: ICD-10-CM

## 2020-01-08 DIAGNOSIS — S13.4XXS WHIPLASH INJURY TO NECK, SEQUELA: Primary | ICD-10-CM

## 2020-01-08 DIAGNOSIS — G44.40 MEDICATION OVERUSE HEADACHE: ICD-10-CM

## 2020-01-08 PROCEDURE — 99214 OFFICE O/P EST MOD 30 MIN: CPT | Mod: S$PBB,,, | Performed by: PSYCHIATRY & NEUROLOGY

## 2020-01-08 PROCEDURE — 99999 PR PBB SHADOW E&M-EST. PATIENT-LVL IV: ICD-10-PCS | Mod: PBBFAC,,, | Performed by: PSYCHIATRY & NEUROLOGY

## 2020-01-08 PROCEDURE — 99214 OFFICE O/P EST MOD 30 MIN: CPT | Mod: PBBFAC | Performed by: PSYCHIATRY & NEUROLOGY

## 2020-01-08 PROCEDURE — 99214 PR OFFICE/OUTPT VISIT, EST, LEVL IV, 30-39 MIN: ICD-10-PCS | Mod: S$PBB,,, | Performed by: PSYCHIATRY & NEUROLOGY

## 2020-01-08 PROCEDURE — 99999 PR PBB SHADOW E&M-EST. PATIENT-LVL IV: CPT | Mod: PBBFAC,,, | Performed by: PSYCHIATRY & NEUROLOGY

## 2020-01-08 RX ORDER — BUTALBITAL, ACETAMINOPHEN AND CAFFEINE 50; 325; 40 MG/1; MG/1; MG/1
1 TABLET ORAL DAILY PRN
Qty: 12 TABLET | Refills: 2 | Status: SHIPPED | OUTPATIENT
Start: 2020-01-08 | End: 2020-02-13

## 2020-01-08 RX ORDER — SUMATRIPTAN SUCCINATE 100 MG/1
100 TABLET ORAL 2 TIMES DAILY PRN
Qty: 20 TABLET | Refills: 11 | Status: SHIPPED | OUTPATIENT
Start: 2020-01-08

## 2020-01-08 RX ORDER — TIZANIDINE 2 MG/1
2 TABLET ORAL 2 TIMES DAILY
Qty: 60 TABLET | Refills: 2 | Status: SHIPPED | OUTPATIENT
Start: 2020-01-08 | End: 2020-04-07
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
## 2020-01-09 PROBLEM — G44.40 MEDICATION OVERUSE HEADACHE: Status: ACTIVE | Noted: 2020-01-09

## 2020-01-09 PROBLEM — S13.4XXA WHIPLASH INJURY TO NECK: Status: ACTIVE | Noted: 2020-01-09

## 2020-01-27 ENCOUNTER — CLINICAL SUPPORT (OUTPATIENT)
Dept: REHABILITATION | Facility: HOSPITAL | Age: 25
End: 2020-01-27
Attending: PSYCHIATRY & NEUROLOGY
Payer: OTHER GOVERNMENT

## 2020-01-27 DIAGNOSIS — R29.898 IMPAIRED STRENGTH OF NECK MUSCLES: ICD-10-CM

## 2020-01-27 DIAGNOSIS — R29.898 DECREASED RANGE OF MOTION OF NECK: ICD-10-CM

## 2020-01-27 DIAGNOSIS — R29.898 DECREASED STRENGTH OF UPPER EXTREMITY: ICD-10-CM

## 2020-01-27 DIAGNOSIS — M54.2 NECK PAIN: ICD-10-CM

## 2020-01-27 PROCEDURE — 97140 MANUAL THERAPY 1/> REGIONS: CPT | Mod: PN

## 2020-01-27 PROCEDURE — 97161 PT EVAL LOW COMPLEX 20 MIN: CPT | Mod: PN

## 2020-01-27 PROCEDURE — 97110 THERAPEUTIC EXERCISES: CPT | Mod: PN
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
## 2020-02-03 ENCOUNTER — CLINICAL SUPPORT (OUTPATIENT)
Dept: REHABILITATION | Facility: HOSPITAL | Age: 25
End: 2020-02-03
Attending: PSYCHIATRY & NEUROLOGY
Payer: OTHER GOVERNMENT

## 2020-02-03 DIAGNOSIS — M54.2 NECK PAIN: ICD-10-CM

## 2020-02-03 DIAGNOSIS — R29.898 IMPAIRED STRENGTH OF NECK MUSCLES: ICD-10-CM

## 2020-02-03 DIAGNOSIS — R29.898 DECREASED STRENGTH OF UPPER EXTREMITY: ICD-10-CM

## 2020-02-03 DIAGNOSIS — R29.898 DECREASED RANGE OF MOTION OF NECK: ICD-10-CM

## 2020-02-03 PROCEDURE — 97140 MANUAL THERAPY 1/> REGIONS: CPT | Mod: PN

## 2020-02-03 PROCEDURE — 97110 THERAPEUTIC EXERCISES: CPT | Mod: PN
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
## 2020-02-06 ENCOUNTER — CLINICAL SUPPORT (OUTPATIENT)
Dept: REHABILITATION | Facility: HOSPITAL | Age: 25
End: 2020-02-06
Attending: PSYCHIATRY & NEUROLOGY
Payer: OTHER GOVERNMENT

## 2020-02-06 DIAGNOSIS — R29.898 DECREASED RANGE OF MOTION OF NECK: ICD-10-CM

## 2020-02-06 DIAGNOSIS — R29.898 DECREASED STRENGTH OF UPPER EXTREMITY: ICD-10-CM

## 2020-02-06 DIAGNOSIS — R29.898 IMPAIRED STRENGTH OF NECK MUSCLES: ICD-10-CM

## 2020-02-06 DIAGNOSIS — M54.2 NECK PAIN: ICD-10-CM

## 2020-02-06 PROCEDURE — 97140 MANUAL THERAPY 1/> REGIONS: CPT | Mod: PN,CQ

## 2020-02-06 PROCEDURE — 97110 THERAPEUTIC EXERCISES: CPT | Mod: PN,CQ
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
## 2020-02-07 ENCOUNTER — CLINICAL SUPPORT (OUTPATIENT)
Dept: OBSTETRICS AND GYNECOLOGY | Facility: CLINIC | Age: 25
End: 2020-02-07
Payer: OTHER GOVERNMENT

## 2020-02-07 DIAGNOSIS — Z23 NEED FOR HPV VACCINATION: Primary | ICD-10-CM

## 2020-02-07 PROCEDURE — 90471 IMMUNIZATION ADMIN: CPT | Mod: PBBFAC

## 2020-02-07 PROCEDURE — 99211 OFF/OP EST MAY X REQ PHY/QHP: CPT | Mod: PBBFAC,25

## 2020-02-07 PROCEDURE — 99999 PR PBB SHADOW E&M-EST. PATIENT-LVL I: CPT | Mod: PBBFAC,,,

## 2020-02-07 PROCEDURE — 99999 PR PBB SHADOW E&M-EST. PATIENT-LVL I: ICD-10-PCS | Mod: PBBFAC,,,

## 2020-02-07 NOTE — PROGRESS NOTES
Here for Gardasil # 2 injection, without complaint at this time. Reports no pain before or after injection. Advised to wait in lobby 15 minutes after injection and report any adverse reactions. Return to clinic in 4 MONTHS for next injection.     Site: [facility]

## 2020-02-10 ENCOUNTER — CLINICAL SUPPORT (OUTPATIENT)
Dept: REHABILITATION | Facility: HOSPITAL | Age: 25
End: 2020-02-10
Attending: PSYCHIATRY & NEUROLOGY
Payer: OTHER GOVERNMENT

## 2020-02-10 DIAGNOSIS — R29.898 DECREASED RANGE OF MOTION OF NECK: ICD-10-CM

## 2020-02-10 DIAGNOSIS — R29.898 DECREASED STRENGTH OF UPPER EXTREMITY: ICD-10-CM

## 2020-02-10 DIAGNOSIS — M54.2 NECK PAIN: ICD-10-CM

## 2020-02-10 DIAGNOSIS — R29.898 IMPAIRED STRENGTH OF NECK MUSCLES: ICD-10-CM

## 2020-02-10 PROCEDURE — 97110 THERAPEUTIC EXERCISES: CPT | Mod: PN

## 2020-02-10 PROCEDURE — 97140 MANUAL THERAPY 1/> REGIONS: CPT | Mod: PN
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
## 2020-02-17 ENCOUNTER — CLINICAL SUPPORT (OUTPATIENT)
Dept: REHABILITATION | Facility: HOSPITAL | Age: 25
End: 2020-02-17
Attending: PSYCHIATRY & NEUROLOGY
Payer: OTHER GOVERNMENT

## 2020-02-17 DIAGNOSIS — R29.898 IMPAIRED STRENGTH OF NECK MUSCLES: ICD-10-CM

## 2020-02-17 DIAGNOSIS — M54.2 NECK PAIN: ICD-10-CM

## 2020-02-17 DIAGNOSIS — R29.898 DECREASED RANGE OF MOTION OF NECK: ICD-10-CM

## 2020-02-17 DIAGNOSIS — R29.898 DECREASED STRENGTH OF UPPER EXTREMITY: ICD-10-CM

## 2020-02-17 PROCEDURE — 97110 THERAPEUTIC EXERCISES: CPT | Mod: PN

## 2020-02-17 PROCEDURE — 97140 MANUAL THERAPY 1/> REGIONS: CPT | Mod: PN
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
## 2020-02-19 ENCOUNTER — CLINICAL SUPPORT (OUTPATIENT)
Dept: REHABILITATION | Facility: HOSPITAL | Age: 25
End: 2020-02-19
Attending: PSYCHIATRY & NEUROLOGY
Payer: OTHER GOVERNMENT

## 2020-02-19 DIAGNOSIS — R29.898 DECREASED STRENGTH OF UPPER EXTREMITY: ICD-10-CM

## 2020-02-19 DIAGNOSIS — R29.898 IMPAIRED STRENGTH OF NECK MUSCLES: ICD-10-CM

## 2020-02-19 DIAGNOSIS — R29.898 DECREASED RANGE OF MOTION OF NECK: ICD-10-CM

## 2020-02-19 DIAGNOSIS — M54.2 NECK PAIN: ICD-10-CM

## 2020-02-19 PROCEDURE — 97110 THERAPEUTIC EXERCISES: CPT | Mod: PN

## 2020-02-19 PROCEDURE — 97140 MANUAL THERAPY 1/> REGIONS: CPT | Mod: PN
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
## 2020-02-26 ENCOUNTER — CLINICAL SUPPORT (OUTPATIENT)
Dept: REHABILITATION | Facility: HOSPITAL | Age: 25
End: 2020-02-26
Attending: PSYCHIATRY & NEUROLOGY
Payer: OTHER GOVERNMENT

## 2020-02-26 DIAGNOSIS — R29.898 DECREASED RANGE OF MOTION OF NECK: ICD-10-CM

## 2020-02-26 DIAGNOSIS — R29.898 DECREASED STRENGTH OF UPPER EXTREMITY: ICD-10-CM

## 2020-02-26 DIAGNOSIS — R29.898 IMPAIRED STRENGTH OF NECK MUSCLES: ICD-10-CM

## 2020-02-26 DIAGNOSIS — M54.2 NECK PAIN: ICD-10-CM

## 2020-02-26 PROCEDURE — 97140 MANUAL THERAPY 1/> REGIONS: CPT | Mod: PN

## 2020-02-26 PROCEDURE — 97110 THERAPEUTIC EXERCISES: CPT | Mod: PN
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
## 2020-03-05 ENCOUNTER — HOSPITAL ENCOUNTER (EMERGENCY)
Facility: OTHER | Age: 25
Discharge: HOME | End: 2020-03-05
Attending: EMERGENCY MEDICINE
Payer: OTHER GOVERNMENT

## 2020-03-05 VITALS
BODY MASS INDEX: 31.41 KG/M2 | WEIGHT: 160 LBS | SYSTOLIC BLOOD PRESSURE: 118 MMHG | RESPIRATION RATE: 18 BRPM | OXYGEN SATURATION: 98 % | HEIGHT: 60 IN | TEMPERATURE: 98 F | DIASTOLIC BLOOD PRESSURE: 72 MMHG | HEART RATE: 78 BPM

## 2020-03-05 DIAGNOSIS — M79.652 LEFT THIGH PAIN: Primary | ICD-10-CM

## 2020-03-05 DIAGNOSIS — M79.10 MYALGIA: ICD-10-CM

## 2020-03-05 LAB
B-HCG UR QL: NEGATIVE
CTP QC/QA: YES

## 2020-03-05 PROCEDURE — 99284 EMERGENCY DEPT VISIT MOD MDM: CPT | Mod: 25

## 2020-03-05 PROCEDURE — 96372 THER/PROPH/DIAG INJ SC/IM: CPT | Mod: 59

## 2020-03-05 PROCEDURE — 63600175 PHARM REV CODE 636 W HCPCS: Performed by: PHYSICIAN ASSISTANT

## 2020-03-05 PROCEDURE — 25000003 PHARM REV CODE 250: Performed by: PHYSICIAN ASSISTANT

## 2020-03-05 PROCEDURE — 81025 URINE PREGNANCY TEST: CPT | Performed by: EMERGENCY MEDICINE

## 2020-03-05 RX ORDER — NAPROXEN 500 MG/1
500 TABLET ORAL 2 TIMES DAILY WITH MEALS
Qty: 20 TABLET | Refills: 0 | Status: SHIPPED | OUTPATIENT
Start: 2020-03-05 | End: 2020-03-15

## 2020-03-05 RX ORDER — KETOROLAC TROMETHAMINE 30 MG/ML
30 INJECTION, SOLUTION INTRAMUSCULAR; INTRAVENOUS
Status: COMPLETED | OUTPATIENT
Start: 2020-03-05 | End: 2020-03-05

## 2020-03-05 RX ORDER — TRIAMCINOLONE ACETONIDE 40 MG/ML
60 INJECTION, SUSPENSION INTRA-ARTICULAR; INTRAMUSCULAR
Status: COMPLETED | OUTPATIENT
Start: 2020-03-05 | End: 2020-03-05

## 2020-03-05 RX ORDER — LIDOCAINE 50 MG/G
1 PATCH TOPICAL DAILY
Qty: 15 PATCH | Refills: 0 | Status: SHIPPED | OUTPATIENT
Start: 2020-03-05

## 2020-03-05 RX ORDER — CYCLOBENZAPRINE HCL 10 MG
10 TABLET ORAL
Status: COMPLETED | OUTPATIENT
Start: 2020-03-05 | End: 2020-03-05

## 2020-03-05 RX ADMIN — KETOROLAC TROMETHAMINE 30 MG: 30 INJECTION, SOLUTION INTRAMUSCULAR; INTRAVENOUS at 04:03

## 2020-03-05 RX ADMIN — CYCLOBENZAPRINE HYDROCHLORIDE 10 MG: 10 TABLET, FILM COATED ORAL at 04:03

## 2020-03-05 RX ADMIN — TRIAMCINOLONE ACETONIDE 60 MG: 40 INJECTION, SUSPENSION INTRA-ARTICULAR; INTRAMUSCULAR at 04:03

## 2020-03-05 NOTE — DISCHARGE INSTRUCTIONS
Please follow-up with your family doctor this week.  You may need repeat ultrasound in 7 days if your symptoms worsen.  Take medication as prescribed

## 2020-03-05 NOTE — ED PROVIDER NOTES
"Encounter Date: 3/5/2020       History     Chief Complaint   Patient presents with    Leg Pain     Pt c/o LLE pain which stated this morning. Pt states "When I woke up, my leg hurt so bad, I can't put no weight on it."     Patient is a 24-year-old female with a history of scoliosis presenting to the ER for evaluation of left thigh pain.  Patient reports pain to the left thigh that started this morning which woke up.  Patient reports she is unable to ambulate because of the severe pain to her left thigh.  She denies any injury or trauma.  No swelling or redness.  Denies any joint pain to the hip or the knee.  Patient denies history of PE or DVT.  No long travel or hospitalizations.  She denies any chest pain palpitations shortness of breath. No fevers or chills at home.  Patient has not taken any medication prior to arrival to the ED today.    The history is provided by the patient.     Review of patient's allergies indicates:  No Known Allergies  Past Medical History:   Diagnosis Date    Frequent headaches     has MRI  and neg per old records    History of concussion     Scoliosis     Urticaria      Past Surgical History:   Procedure Laterality Date     SECTION      TONSILLECTOMY       Family History   Problem Relation Age of Onset    Ovarian cancer Maternal Grandmother 66    Hypertension Maternal Grandmother     Diabetes Maternal Grandmother     No Known Problems Mother     Hypertension Father     Eczema Sister     Ulcers Sister     Scoliosis Sister     Psoriasis Sister     No Known Problems Brother     No Known Problems Maternal Aunt     No Known Problems Maternal Uncle     No Known Problems Paternal Aunt     No Known Problems Paternal Uncle     Hypertension Maternal Grandfather     Hypertension Paternal Grandmother     No Known Problems Paternal Grandfather     Breast cancer Cousin 34    Colon cancer Neg Hx     Melanoma Neg Hx     Lupus Neg Hx     Amblyopia Neg Hx     " Blindness Neg Hx     Cancer Neg Hx     Cataracts Neg Hx     Glaucoma Neg Hx     Macular degeneration Neg Hx     Retinal detachment Neg Hx     Strabismus Neg Hx     Stroke Neg Hx     Thyroid disease Neg Hx      Social History     Tobacco Use    Smoking status: Never Smoker    Smokeless tobacco: Never Used   Substance Use Topics    Alcohol use: Yes     Frequency: 2-4 times a month     Drinks per session: 1 or 2     Binge frequency: Less than monthly     Comment: socially/ pre pregnancy     Drug use: No     Review of Systems   Constitutional: Negative for chills and fever.   HENT: Negative for congestion.    Respiratory: Negative for cough and shortness of breath.    Cardiovascular: Negative for chest pain, palpitations and leg swelling.   Gastrointestinal: Negative for abdominal pain, nausea and vomiting.   Genitourinary: Negative for flank pain.   Musculoskeletal: Positive for myalgias. Negative for arthralgias and joint swelling.   Skin: Negative for rash.   Allergic/Immunologic: Negative for immunocompromised state.   Neurological: Negative for weakness and numbness.   Hematological: Does not bruise/bleed easily.   Psychiatric/Behavioral: Negative for confusion.       Physical Exam     Initial Vitals [03/05/20 1415]   BP Pulse Resp Temp SpO2   134/63 104 18 99 °F (37.2 °C) 99 %      MAP       --         Physical Exam    Vitals reviewed.  Constitutional: She appears well-developed and well-nourished. She is not diaphoretic. No distress.   HENT:   Head: Normocephalic and atraumatic.   Mouth/Throat: Oropharynx is clear and moist.   Eyes: Conjunctivae and EOM are normal.   Neck: Neck supple.   Cardiovascular: Normal rate, regular rhythm, normal heart sounds and intact distal pulses.   Pulmonary/Chest: Breath sounds normal.   Musculoskeletal:        Left hip: Normal.        Left knee: Normal.        Left upper leg: She exhibits tenderness. She exhibits no bony tenderness, no swelling and no edema.    Neurological: She is alert and oriented to person, place, and time.   Skin: Skin is warm and dry.         ED Course   Procedures  Labs Reviewed   POCT URINE PREGNANCY          Imaging Results          US Lower Extremity Veins Left (Final result)  Result time 03/05/20 17:28:01    Final result by Shashi Carrion MD (03/05/20 17:28:01)                 Impression:      No evidence of deep venous thrombosis in the left lower extremity.      Electronically signed by: Shashi Carrion MD  Date:    03/05/2020  Time:    17:28             Narrative:    EXAMINATION:  US LOWER EXTREMITY VEINS LEFT    CLINICAL HISTORY:  Pain in left thigh    TECHNIQUE:  Duplex and color flow Doppler evaluation and graded compression of the left lower extremity veins was performed.    COMPARISON:  None    FINDINGS:  Duplex and color flow Doppler evaluation does not reveal any evidence of acute venous thrombosis in the common femoral, superficial femoral, greater saphenous, popliteal, peroneal, anterior tibial and posterior tibial veins of the left lower extremity.  There is no reflux to suggest valvular incompetence.                               X-Ray Hip 2 View Left (Final result)  Result time 03/05/20 17:12:45    Final result by Simran Santos MD (03/05/20 17:12:45)                 Impression:      As above described.      Electronically signed by: Simran Santos  Date:    03/05/2020  Time:    17:12             Narrative:    EXAMINATION:  TWO VIEWS OF THE LEFT HIP    CLINICAL HISTORY:  left thigh pain;    TECHNIQUE:  AP and lateral view of the left hip    COMPARISON:  None.    FINDINGS:  Two views of the left hip demonstrate no definite acute fracture or dislocation.  There is a obliquely oriented small lucency seen through the right pubic symphysis on the AP view, which may represent an artifact versus a nondisplaced fracture.                                       APC / Resident Notes:   Patient seen in the ER promptly upon arrival.   She is afebrile, no acute distress. Physical examination reveals mild tenderness on palpation to the left thigh.  No erythema or swelling noted. Range of motion of the hip on the left knee is fully intact.    Will obtain x-ray and ultrasound of lower extremity    Patient has had significant alleviation after pain medications were given.  X-ray does not reveal any acute fracture dislocation.  Small lucency through the pubic symphysis likely artifact given the patient does not have any history trauma. Ultrasound of left lower extremity negative for DVT.    Symptoms likely secondary to musculoskeletal etiology possible strain.  She will be prescribed home on Lidoderm patch and naproxen use drugs.  Advised her to use an ice or heat pack to the affected region.  Patient advised to follow up with family doctor this week.  She was advised to repeat ultrasound of her left lower extremity if her symptoms worsen.  She was given strict return precautions the ED.  Stable for discharge and close follow-up.              ED Course as of Mar 05 1738   Thu Mar 05, 2020   1546 Patient presents to the ED with c/o right leg pain, very difficult and painful to bear weight.  Onset this morning.  No recent trauma.    Patient seen and medically screened by the Physician Assistant in [facility] due to ED crowding. Appropriate tests and/or medications ordered. A medical screening exam has been performed. The care will be assumed by myself or another provider when treatment space becomes available. I am not assuming care of this patient at this time. 3:46 PM. AM[facility]       [AG]      ED Course User Index  [AG] Oseas Lucas PA-C                Clinical Impression:       ICD-10-CM ICD-9-CM   1. Left thigh pain M79.652 729.5   2. Myalgia M79.10 729.1             ED Disposition Condition    Discharge Stable        ED Prescriptions     Medication Sig Dispense Start Date End Date Auth. Provider    lidocaine (LIDODERM) 5 % Place 1 patch onto the  skin once daily. Remove & Discard patch within 12 hours or as directed by MD 15 patch 3/5/2020  Keyla Elmore PA-C    naproxen (NAPROSYN) 500 MG tablet Take 1 tablet (500 mg total) by mouth 2 (two) times daily with meals. for 10 days 20 tablet 3/5/2020 3/15/2020 Keyla Elmore PA-C        Follow-up Information     Follow up With Specialties Details Why Contact Info    Lizbeth Galo MD Internal Medicine   [address] [facility] [address]  [phone]                                       Keyla Elmore PA-C  03/05/20 5790

## 2020-03-05 NOTE — ED TRIAGE NOTES
Patient presents to ER with c/o Left hip/leg pain that started this morning.  Patient stated her symptoms started this morning.  Patient states it hurts to walk.  Patient reports taking Goody Pain reliever with minimal to no relieve.  Patient denies chest pain and sob.

## 2020-03-09 ENCOUNTER — CLINICAL SUPPORT (OUTPATIENT)
Dept: REHABILITATION | Facility: HOSPITAL | Age: 25
End: 2020-03-09
Attending: PSYCHIATRY & NEUROLOGY
Payer: OTHER GOVERNMENT

## 2020-03-09 DIAGNOSIS — M54.2 NECK PAIN: ICD-10-CM

## 2020-03-09 DIAGNOSIS — R29.898 IMPAIRED STRENGTH OF NECK MUSCLES: ICD-10-CM

## 2020-03-09 DIAGNOSIS — R29.898 DECREASED STRENGTH OF UPPER EXTREMITY: ICD-10-CM

## 2020-03-09 DIAGNOSIS — R29.898 DECREASED RANGE OF MOTION OF NECK: ICD-10-CM

## 2020-03-09 PROCEDURE — 97140 MANUAL THERAPY 1/> REGIONS: CPT | Mod: PN

## 2020-03-09 PROCEDURE — 97110 THERAPEUTIC EXERCISES: CPT | Mod: PN
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
## 2020-03-10 ENCOUNTER — CLINICAL SUPPORT (OUTPATIENT)
Dept: REHABILITATION | Facility: HOSPITAL | Age: 25
End: 2020-03-10
Attending: PSYCHIATRY & NEUROLOGY
Payer: OTHER GOVERNMENT

## 2020-03-10 DIAGNOSIS — R29.898 DECREASED STRENGTH OF UPPER EXTREMITY: ICD-10-CM

## 2020-03-10 DIAGNOSIS — R29.898 DECREASED RANGE OF MOTION OF NECK: ICD-10-CM

## 2020-03-10 DIAGNOSIS — R29.898 IMPAIRED STRENGTH OF NECK MUSCLES: ICD-10-CM

## 2020-03-10 DIAGNOSIS — M54.2 NECK PAIN: ICD-10-CM

## 2020-03-10 PROCEDURE — 97110 THERAPEUTIC EXERCISES: CPT | Mod: PN
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
## 2020-03-10 NOTE — PROGRESS NOTES
Patient received IM Depo Provera to the upper outer side of right buttock on 03/10/2020   The patient was instructed to get the next injection between 05/26-06/09     Lot Number: [id number]  Expiration Date: 11/21  BY JAHAIRA MONTAÑO

## 2020-03-17 ENCOUNTER — TELEPHONE (OUTPATIENT)
Dept: INTERNAL MEDICINE | Facility: CLINIC | Age: 25
End: 2020-03-17

## 2020-03-17 ENCOUNTER — OFFICE VISIT (OUTPATIENT)
Dept: INTERNAL MEDICINE | Facility: CLINIC | Age: 25
End: 2020-03-17
Payer: OTHER GOVERNMENT

## 2020-03-17 DIAGNOSIS — L29.9 PRURITUS: Primary | ICD-10-CM

## 2020-03-17 PROCEDURE — 99499 NO LOS: ICD-10-PCS | Mod: 95,,, | Performed by: INTERNAL MEDICINE

## 2020-03-17 PROCEDURE — 99499 UNLISTED E&M SERVICE: CPT | Mod: 95,,, | Performed by: INTERNAL MEDICINE

## 2020-03-17 NOTE — PROGRESS NOTES
Technical difficulty as pt was unable to hear me on opening virtual visit. She was not on wifi which may explain. Staff has already spoken with her and rescheduled appt.

## 2020-03-17 NOTE — TELEPHONE ENCOUNTER
----- Message from Shanell Smith sent at 3/17/2020  2:18 PM CDT -----  Contact: P    Name of Who is Calling:ELIZ ALBA [[id number]]    What is the request in detail: Patient would like to have an online visit for her appointment Please contact to further discuss and advise    Can the clinic reply by [facility]: nO    What Number to Call Back if not in [facility]: [phone]

## 2020-03-17 NOTE — TELEPHONE ENCOUNTER
----- Message from Shanell Smith sent at 3/17/2020 11:08 AM CDT -----  Contact: pt  Type:  Patient Returning Call    Who Called: ELIZ ALBA [[id number]]    Who Left Message for Patient: Breana Hanson MA     Does the patient know what this is regarding?: pt stated she is opened to online visit , pt asked what time will appointment start     Best Call Back Number: [phone]    Additional Information:no

## 2020-03-20 ENCOUNTER — OFFICE VISIT (OUTPATIENT)
Dept: INTERNAL MEDICINE | Facility: CLINIC | Age: 25
End: 2020-03-20
Attending: INTERNAL MEDICINE
Payer: OTHER GOVERNMENT

## 2020-03-20 ENCOUNTER — TELEPHONE (OUTPATIENT)
Dept: INTERNAL MEDICINE | Facility: CLINIC | Age: 25
End: 2020-03-20

## 2020-03-20 ENCOUNTER — TELEPHONE (OUTPATIENT)
Dept: DERMATOLOGY | Facility: CLINIC | Age: 25
End: 2020-03-20

## 2020-03-20 DIAGNOSIS — R45.4 IRRITABILITY: ICD-10-CM

## 2020-03-20 DIAGNOSIS — Z72.820 POOR SLEEP: Primary | ICD-10-CM

## 2020-03-20 DIAGNOSIS — F41.9 ANXIETY: ICD-10-CM

## 2020-03-20 PROCEDURE — 99214 PR OFFICE/OUTPT VISIT, EST, LEVL IV, 30-39 MIN: ICD-10-PCS | Mod: 95,,, | Performed by: INTERNAL MEDICINE

## 2020-03-20 PROCEDURE — 99214 OFFICE O/P EST MOD 30 MIN: CPT | Mod: 95,,, | Performed by: INTERNAL MEDICINE

## 2020-03-20 RX ORDER — SERTRALINE HYDROCHLORIDE 50 MG/1
50 TABLET, FILM COATED ORAL DAILY
Qty: 30 TABLET | Refills: 1 | Status: SHIPPED | OUTPATIENT
Start: 2020-03-20 | End: 2021-03-20

## 2020-03-20 NOTE — TELEPHONE ENCOUNTER
Spoke with pt. Virtual visit scheduled for 03/24/2020 at 10:00 with Dr. Augustin. Riccardo thanks.
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
## 2020-03-20 NOTE — TELEPHONE ENCOUNTER
----- Message from Allyssa Boyle sent at 3/20/2020 10:06 AM CDT -----  Contact: pt  Pt is calling to see if she can speak with Rhiannon Ritchie or a virtual visit due to her skin not getting better. Pt states the cream is not working. Please give pt a call back at [phone] .

## 2020-03-20 NOTE — TELEPHONE ENCOUNTER
Please provide pt with numbers for [facility] for counseling   Please arrange virtual visit in 4 weeks with me for med check

## 2020-03-23 ENCOUNTER — PATIENT MESSAGE (OUTPATIENT)
Dept: DERMATOLOGY | Facility: CLINIC | Age: 25
End: 2020-03-23
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
## 2020-03-24 ENCOUNTER — OFFICE VISIT (OUTPATIENT)
Dept: DERMATOLOGY | Facility: CLINIC | Age: 25
End: 2020-03-24
Payer: OTHER GOVERNMENT

## 2020-03-24 DIAGNOSIS — R21 RASH AND NONSPECIFIC SKIN ERUPTION: Primary | ICD-10-CM

## 2020-03-24 PROCEDURE — 99213 OFFICE O/P EST LOW 20 MIN: CPT | Mod: 95,,, | Performed by: PATHOLOGY

## 2020-03-24 PROCEDURE — 99213 PR OFFICE/OUTPT VISIT, EST, LEVL III, 20-29 MIN: ICD-10-PCS | Mod: 95,,, | Performed by: PATHOLOGY

## 2020-03-24 RX ORDER — IVERMECTIN 3 MG/1
TABLET ORAL
Qty: 12 TABLET | Refills: 0 | Status: SHIPPED | OUTPATIENT
Start: 2020-03-24 | End: 2020-03-27

## 2020-03-24 RX ORDER — FLUOCINONIDE 0.5 MG/G
OINTMENT TOPICAL
Qty: 60 G | Refills: 3 | Status: SHIPPED | OUTPATIENT
Start: 2020-03-24 | End: 2020-03-27

## 2020-03-24 RX ORDER — PREDNISONE 20 MG/1
TABLET ORAL
Qty: 42 TABLET | Refills: 0 | Status: SHIPPED | OUTPATIENT
Start: 2020-03-24 | End: 2020-03-27

## 2020-03-24 NOTE — PROGRESS NOTES
Subjective:       Patient ID:  Suzanna Cai is a 25 y.o. female who presents for No chief complaint on file.    HPI The patient location is: home  The chief complaint leading to consultation is: persistent pruritus - started nearly a year ago to breasts and inframammary areas.  This resolved.  Treated for eczema by Dr. Simms and for suspected scabies by Rhiannon Ritchie with topical permethrin.  Of note, her baby and  were diagnosed with scabies around the same time.  Has used topical steroids, as well - triamcinolone and mometasone with minimal relief.  Visit type: Virtual visit with synchronous audio and video  Total time spent with patient: 17 minues  Each patient to whom he or she provides medical services by telemedicine is:  (1) informed of the relationship between the physician and patient and the respective role of any other health care provider with respect to management of the patient; and (2) notified that he or she may decline to receive medical services by telemedicine and may withdraw from such care at any time.    Notes: Now with persistent pruritus, hive-like bumps and hyperpigmentation to thighs and groin.              Review of Systems   Constitutional: Negative for fever, chills, fatigue and malaise.   Skin: Positive for itching, rash and dry skin. Negative for sun sensitivity.        Objective:    Physical Exam   Constitutional: She appears well-developed and well-nourished.   Neurological: She is alert.   Skin:   Areas Examined (abnormalities noted in diagram):   RLE Inspected  LLE Inspection Performed              Diagram Legend     Erythematous scaling macule/papule c/w actinic keratosis       Vascular papule c/w angioma      Pigmented verrucoid papule/plaque c/w seborrheic keratosis      Yellow umbilicated papule c/w sebaceous hyperplasia      Irregularly shaped tan macule c/w lentigo     1-2 mm smooth white papules consistent with Milia      Movable subcutaneous cyst with punctum  c/w epidermal inclusion cyst      Subcutaneous movable cyst c/w pilar cyst      Firm pink to brown papule c/w dermatofibroma      Pedunculated fleshy papule(s) c/w skin tag(s)      Evenly pigmented macule c/w junctional nevus     Mildly variegated pigmented, slightly irregular-bordered macule c/w mildly atypical nevus      Flesh colored to evenly pigmented papule c/w intradermal nevus       Pink pearly papule/plaque c/w basal cell carcinoma      Erythematous hyperkeratotic cursted plaque c/w SCC      Surgical scar with no sign of skin cancer recurrence      Open and closed comedones      Inflammatory papules and pustules      Verrucoid papule consistent consistent with wart     Erythematous eczematous patches and plaques     Dystrophic onycholytic nail with subungual debris c/w onychomycosis     Umbilicated papule    Erythematous-base heme-crusted tan verrucoid plaque consistent with inflamed seborrheic keratosis     Erythematous Silvery Scaling Plaque c/w Psoriasis     See annotation      Assessment / Plan:        Rash and nonspecific skin eruption - recurrent scabies vs papular urticaria vs id reaction vs folliculitis with component of LSC and PIPA  -     predniSONE (DELTASONE) 20 MG tablet; Take 3 pills po qam with breakfast x 1 wk then take 2 po qam with breakfast x 1 wk then take 1 po qam with breakfast x 1 wk  Dispense: 42 tablet; Refill: 0  -     fluocinonide (LIDEX) 0.05 % ointment; AAA bid  Dispense: 60 g; Refill: 3  -     ivermectin (STROMECTOL) 3 mg Tab; Take 6 pills together then repeat in 2 weeks  Dispense: 12 tablet; Refill: 0  - repeat elimite cream; repeat in 1 week             No follow-ups on file.

## 2020-03-27 ENCOUNTER — TELEPHONE (OUTPATIENT)
Dept: DERMATOLOGY | Facility: CLINIC | Age: 25
End: 2020-03-27

## 2020-03-27 DIAGNOSIS — R21 RASH AND NONSPECIFIC SKIN ERUPTION: ICD-10-CM

## 2020-03-27 RX ORDER — FLUOCINONIDE 0.5 MG/G
OINTMENT TOPICAL
Qty: 60 G | Refills: 3 | Status: SHIPPED | OUTPATIENT
Start: 2020-03-27

## 2020-03-27 RX ORDER — IVERMECTIN 3 MG/1
TABLET ORAL
Qty: 12 TABLET | Refills: 0 | Status: SHIPPED | OUTPATIENT
Start: 2020-03-27

## 2020-03-27 RX ORDER — PREDNISONE 20 MG/1
TABLET ORAL
Qty: 42 TABLET | Refills: 0 | Status: SHIPPED | OUTPATIENT
Start: 2020-03-27

## 2020-03-27 NOTE — TELEPHONE ENCOUNTER
Returning pt phone call, no answer , unable to leave vm ----- Message from Annabel Singh sent at 3/27/2020  3:22 PM CDT -----  Contact: pt   ANTONIA- pt - pt is calling to speak with Cassiusgaldino about her prescriptions can you please call pt at [phone].    JOHNY

## 2020-03-27 NOTE — TELEPHONE ENCOUNTER
----- Message from Annabelamado Singh sent at 3/27/2020  1:26 PM CDT -----  Contact: pt   BV - pt- pt is calling to follow up on a call on the pts prescriptions to see if they have been called in to her pharmacy yet. Can you please call pt at [phone]. Pt left a message early this morning and hasnt gotten a call back yet.    JOHNY

## 2020-10-07 ENCOUNTER — PATIENT MESSAGE (OUTPATIENT)
Dept: ADMINISTRATIVE | Facility: HOSPITAL | Age: 25
End: 2020-10-07

## 2021-01-04 ENCOUNTER — PATIENT MESSAGE (OUTPATIENT)
Dept: ADMINISTRATIVE | Facility: HOSPITAL | Age: 26
End: 2021-01-04

## 2021-04-26 ENCOUNTER — PATIENT MESSAGE (OUTPATIENT)
Dept: RESEARCH | Facility: HOSPITAL | Age: 26
End: 2021-04-26

## 2021-10-05 ENCOUNTER — PATIENT MESSAGE (OUTPATIENT)
Dept: ADMINISTRATIVE | Facility: HOSPITAL | Age: 26
End: 2021-10-05

## 2022-05-30 ENCOUNTER — PATIENT MESSAGE (OUTPATIENT)
Dept: ADMINISTRATIVE | Facility: HOSPITAL | Age: 27
End: 2022-05-30
Payer: OTHER GOVERNMENT